# Patient Record
Sex: MALE | Race: WHITE | Employment: OTHER | URBAN - NONMETROPOLITAN AREA
[De-identification: names, ages, dates, MRNs, and addresses within clinical notes are randomized per-mention and may not be internally consistent; named-entity substitution may affect disease eponyms.]

---

## 2018-02-22 RX ORDER — AMLODIPINE BESYLATE 2.5 MG/1
2.5 TABLET ORAL DAILY
Status: ON HOLD | COMMUNITY
End: 2020-02-18 | Stop reason: ALTCHOICE

## 2018-02-22 RX ORDER — LISINOPRIL 40 MG/1
40 TABLET ORAL DAILY
Status: ON HOLD | COMMUNITY
End: 2020-02-18 | Stop reason: ALTCHOICE

## 2018-02-22 RX ORDER — HYDROCHLOROTHIAZIDE 12.5 MG/1
12.5 CAPSULE, GELATIN COATED ORAL EVERY MORNING
Status: ON HOLD | COMMUNITY
End: 2020-02-18 | Stop reason: ALTCHOICE

## 2018-03-04 PROBLEM — H25.811 COMBINED FORM OF AGE-RELATED CATARACT, RIGHT EYE: Chronic | Status: ACTIVE | Noted: 2018-03-04

## 2018-03-05 ENCOUNTER — HOSPITAL ENCOUNTER (OUTPATIENT)
Age: 78
Setting detail: OUTPATIENT SURGERY
Discharge: HOME | End: 2018-03-05
Attending: OPHTHALMOLOGY | Admitting: OPHTHALMOLOGY
Payer: MEDICARE

## 2018-03-05 VITALS
WEIGHT: 210 LBS | RESPIRATION RATE: 18 BRPM | HEIGHT: 72 IN | BODY MASS INDEX: 28.44 KG/M2 | TEMPERATURE: 96.9 F | HEART RATE: 58 BPM | OXYGEN SATURATION: 95 % | DIASTOLIC BLOOD PRESSURE: 88 MMHG | SYSTOLIC BLOOD PRESSURE: 145 MMHG

## 2018-03-05 PROBLEM — H25.811 COMBINED FORM OF AGE-RELATED CATARACT, RIGHT EYE: Chronic | Status: RESOLVED | Noted: 2018-03-04 | Resolved: 2018-03-05

## 2018-03-05 PROCEDURE — 7100000010 HC PHASE II RECOVERY - FIRST 15 MIN: Performed by: OPHTHALMOLOGY

## 2018-03-05 PROCEDURE — 7100000011 HC PHASE II RECOVERY - ADDTL 15 MIN: Performed by: OPHTHALMOLOGY

## 2018-03-05 PROCEDURE — 99153 MOD SED SAME PHYS/QHP EA: CPT | Performed by: OPHTHALMOLOGY

## 2018-03-05 PROCEDURE — 99152 MOD SED SAME PHYS/QHP 5/>YRS: CPT | Performed by: OPHTHALMOLOGY

## 2018-03-05 PROCEDURE — V2632 POST CHMBR INTRAOCULAR LENS: HCPCS | Performed by: OPHTHALMOLOGY

## 2018-03-05 PROCEDURE — 2500000003 HC RX 250 WO HCPCS

## 2018-03-05 PROCEDURE — 6370000000 HC RX 637 (ALT 250 FOR IP): Performed by: OPHTHALMOLOGY

## 2018-03-05 PROCEDURE — 2580000003 HC RX 258: Performed by: OPHTHALMOLOGY

## 2018-03-05 PROCEDURE — 2500000003 HC RX 250 WO HCPCS: Performed by: OPHTHALMOLOGY

## 2018-03-05 PROCEDURE — 6360000002 HC RX W HCPCS

## 2018-03-05 PROCEDURE — 3600000003 HC SURGERY LEVEL 3 BASE: Performed by: OPHTHALMOLOGY

## 2018-03-05 PROCEDURE — 6360000002 HC RX W HCPCS: Performed by: OPHTHALMOLOGY

## 2018-03-05 PROCEDURE — 3600000013 HC SURGERY LEVEL 3 ADDTL 15MIN: Performed by: OPHTHALMOLOGY

## 2018-03-05 DEVICE — LENS INTOCU +20 DIOPT L12.5MM DIA6MM 119.1 OPTICAL/118.7 A: Type: IMPLANTABLE DEVICE | Status: FUNCTIONAL

## 2018-03-05 RX ORDER — LORATADINE 10 MG/1
10 CAPSULE, LIQUID FILLED ORAL DAILY
COMMUNITY

## 2018-03-05 RX ORDER — PHENYLEPHRINE HCL 2.5 %
1 DROPS OPHTHALMIC (EYE) SEE ADMIN INSTRUCTIONS
Status: DISCONTINUED | OUTPATIENT
Start: 2018-03-05 | End: 2018-03-05 | Stop reason: HOSPADM

## 2018-03-05 RX ORDER — ONDANSETRON 2 MG/ML
4 INJECTION INTRAMUSCULAR; INTRAVENOUS EVERY 6 HOURS PRN
Status: DISCONTINUED | OUTPATIENT
Start: 2018-03-05 | End: 2018-03-05 | Stop reason: HOSPADM

## 2018-03-05 RX ORDER — MIDAZOLAM HYDROCHLORIDE 1 MG/ML
INJECTION INTRAMUSCULAR; INTRAVENOUS PRN
Status: DISCONTINUED | OUTPATIENT
Start: 2018-03-05 | End: 2018-03-05 | Stop reason: HOSPADM

## 2018-03-05 RX ORDER — TROPICAMIDE 10 MG/ML
1 SOLUTION/ DROPS OPHTHALMIC SEE ADMIN INSTRUCTIONS
Status: DISCONTINUED | OUTPATIENT
Start: 2018-03-05 | End: 2018-03-05 | Stop reason: HOSPADM

## 2018-03-05 RX ORDER — FENTANYL CITRATE 50 UG/ML
INJECTION, SOLUTION INTRAMUSCULAR; INTRAVENOUS PRN
Status: DISCONTINUED | OUTPATIENT
Start: 2018-03-05 | End: 2018-03-05 | Stop reason: HOSPADM

## 2018-03-05 RX ORDER — PROPARACAINE HYDROCHLORIDE 5 MG/ML
1 SOLUTION/ DROPS OPHTHALMIC SEE ADMIN INSTRUCTIONS
Status: DISCONTINUED | OUTPATIENT
Start: 2018-03-05 | End: 2018-03-05 | Stop reason: HOSPADM

## 2018-03-05 RX ORDER — SODIUM CHLORIDE 0.9 % (FLUSH) 0.9 %
10 SYRINGE (ML) INJECTION PRN
Status: DISCONTINUED | OUTPATIENT
Start: 2018-03-05 | End: 2018-03-05 | Stop reason: HOSPADM

## 2018-03-05 RX ORDER — ACETAMINOPHEN 325 MG/1
650 TABLET ORAL EVERY 4 HOURS PRN
Status: DISCONTINUED | OUTPATIENT
Start: 2018-03-05 | End: 2018-03-05 | Stop reason: HOSPADM

## 2018-03-05 RX ORDER — SODIUM CHLORIDE 9 MG/ML
INJECTION, SOLUTION INTRAVENOUS CONTINUOUS
Status: DISCONTINUED | OUTPATIENT
Start: 2018-03-05 | End: 2018-03-05 | Stop reason: HOSPADM

## 2018-03-05 RX ORDER — SODIUM CHLORIDE 0.9 % (FLUSH) 0.9 %
10 SYRINGE (ML) INJECTION EVERY 12 HOURS SCHEDULED
Status: DISCONTINUED | OUTPATIENT
Start: 2018-03-05 | End: 2018-03-05 | Stop reason: HOSPADM

## 2018-03-05 RX ORDER — TETRACAINE HYDROCHLORIDE 5 MG/ML
1 SOLUTION OPHTHALMIC SEE ADMIN INSTRUCTIONS
Status: DISCONTINUED | OUTPATIENT
Start: 2018-03-05 | End: 2018-03-05 | Stop reason: HOSPADM

## 2018-03-05 RX ORDER — LIDOCAINE HYDROCHLORIDE 10 MG/ML
INJECTION, SOLUTION EPIDURAL; INFILTRATION; INTRACAUDAL; PERINEURAL PRN
Status: DISCONTINUED | OUTPATIENT
Start: 2018-03-05 | End: 2018-03-05 | Stop reason: HOSPADM

## 2018-03-05 RX ADMIN — PHENYLEPHRINE HYDROCHLORIDE 1 DROP: 25 SOLUTION/ DROPS OPHTHALMIC at 12:25

## 2018-03-05 RX ADMIN — TROPICAMIDE 1 DROP: 10 SOLUTION/ DROPS OPHTHALMIC at 12:37

## 2018-03-05 RX ADMIN — TROPICAMIDE 1 DROP: 10 SOLUTION/ DROPS OPHTHALMIC at 12:25

## 2018-03-05 RX ADMIN — PROPARACAINE HYDROCHLORIDE 1 DROP: 5 SOLUTION/ DROPS OPHTHALMIC at 12:17

## 2018-03-05 RX ADMIN — PROPARACAINE HYDROCHLORIDE 1 DROP: 5 SOLUTION/ DROPS OPHTHALMIC at 12:37

## 2018-03-05 RX ADMIN — PHENYLEPHRINE HYDROCHLORIDE 1 DROP: 25 SOLUTION/ DROPS OPHTHALMIC at 12:37

## 2018-03-05 RX ADMIN — PROPARACAINE HYDROCHLORIDE 1 DROP: 5 SOLUTION/ DROPS OPHTHALMIC at 12:25

## 2018-03-05 RX ADMIN — TROPICAMIDE 1 DROP: 10 SOLUTION/ DROPS OPHTHALMIC at 12:17

## 2018-03-05 RX ADMIN — PHENYLEPHRINE HYDROCHLORIDE 1 DROP: 25 SOLUTION/ DROPS OPHTHALMIC at 12:17

## 2018-03-05 RX ADMIN — SODIUM CHLORIDE: 9 INJECTION, SOLUTION INTRAVENOUS at 12:25

## 2018-03-05 RX ADMIN — TETRACAINE HYDROCHLORIDE 1 DROP: 25 LIQUID OPHTHALMIC at 13:00

## 2018-03-05 ASSESSMENT — PAIN SCALES - GENERAL
PAINLEVEL_OUTOF10: 0
PAINLEVEL_OUTOF10: 0

## 2018-03-05 ASSESSMENT — PAIN - FUNCTIONAL ASSESSMENT: PAIN_FUNCTIONAL_ASSESSMENT: 0-10

## 2018-03-05 NOTE — OP NOTE
OPERATIVE NOTE      Patient:  Dea Machuca    YOB: 1940    Account #:  [de-identified]    Date:  3/5/2018    Surgeon:  Chana Broderick. 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 year old with decreased vision, glare and halos around lights, and trouble with activities of daily living. Examination revealed a visually significant cataract in the right eye. Other eye diseases have been ruled out as the primary cause of decreased visual function. Significant improvement is expected in the patient's visual acuity and/or visual function from the removal of the cataract. Risks, benefits, and alternatives to surgery were discussed with the patient and the patient elected to proceed with phacoemulsification with lens implantation. Details of the procedure: Following informed consent, the patient was identified by name and identification tag in the holding area,taken to the operating room and placed in the supine position. A time out was performed by all present in the room to identify the patient, the eye to be operated on, the correct operative procedure, and the correct intraocular lens. Monitoring leads were placed. The patient was positioned. An eyelid prep of half-strength Betadine was performed. The patient was administered intravenous sedation if desired and topical anesthetic was placed in the operative eye. The eye prepped a second time and draped in the usual sterile fashion using aseptic technique for cataract surgery. A lid speculum was then inserted. [place] was placed onto the corneal surface.   A stab incision

## 2018-04-01 PROBLEM — H25.812 COMBINED FORMS OF AGE-RELATED CATARACT OF LEFT EYE: Chronic | Status: ACTIVE | Noted: 2018-04-01

## 2018-04-02 ENCOUNTER — HOSPITAL ENCOUNTER (OUTPATIENT)
Age: 78
Setting detail: OUTPATIENT SURGERY
Discharge: HOME | End: 2018-04-02
Attending: OPHTHALMOLOGY | Admitting: OPHTHALMOLOGY
Payer: MEDICARE

## 2018-04-02 VITALS
DIASTOLIC BLOOD PRESSURE: 119 MMHG | OXYGEN SATURATION: 96 % | RESPIRATION RATE: 18 BRPM | TEMPERATURE: 97 F | WEIGHT: 200 LBS | SYSTOLIC BLOOD PRESSURE: 149 MMHG | HEART RATE: 65 BPM | HEIGHT: 72 IN | BODY MASS INDEX: 27.09 KG/M2

## 2018-04-02 PROBLEM — H25.812 COMBINED FORMS OF AGE-RELATED CATARACT OF LEFT EYE: Chronic | Status: RESOLVED | Noted: 2018-04-01 | Resolved: 2018-04-02

## 2018-04-02 PROCEDURE — 7100000010 HC PHASE II RECOVERY - FIRST 15 MIN: Performed by: OPHTHALMOLOGY

## 2018-04-02 PROCEDURE — 7100000011 HC PHASE II RECOVERY - ADDTL 15 MIN: Performed by: OPHTHALMOLOGY

## 2018-04-02 PROCEDURE — 2580000003 HC RX 258: Performed by: OPHTHALMOLOGY

## 2018-04-02 PROCEDURE — 99152 MOD SED SAME PHYS/QHP 5/>YRS: CPT | Performed by: OPHTHALMOLOGY

## 2018-04-02 PROCEDURE — V2632 POST CHMBR INTRAOCULAR LENS: HCPCS | Performed by: OPHTHALMOLOGY

## 2018-04-02 PROCEDURE — 6370000000 HC RX 637 (ALT 250 FOR IP): Performed by: OPHTHALMOLOGY

## 2018-04-02 PROCEDURE — 3600000013 HC SURGERY LEVEL 3 ADDTL 15MIN: Performed by: OPHTHALMOLOGY

## 2018-04-02 PROCEDURE — 2500000003 HC RX 250 WO HCPCS: Performed by: OPHTHALMOLOGY

## 2018-04-02 PROCEDURE — 99153 MOD SED SAME PHYS/QHP EA: CPT | Performed by: OPHTHALMOLOGY

## 2018-04-02 PROCEDURE — 3600000003 HC SURGERY LEVEL 3 BASE: Performed by: OPHTHALMOLOGY

## 2018-04-02 PROCEDURE — 6360000002 HC RX W HCPCS: Performed by: OPHTHALMOLOGY

## 2018-04-02 DEVICE — LENS INTRAOCULAR BCNVX 20.5+ DIOPT 6X12.5 MM ACRYL ENVISTA: Type: IMPLANTABLE DEVICE | Status: FUNCTIONAL

## 2018-04-02 RX ORDER — SODIUM CHLORIDE 9 MG/ML
INJECTION, SOLUTION INTRAVENOUS CONTINUOUS
Status: DISCONTINUED | OUTPATIENT
Start: 2018-04-02 | End: 2018-04-02 | Stop reason: HOSPADM

## 2018-04-02 RX ORDER — TETRACAINE HYDROCHLORIDE 5 MG/ML
1 SOLUTION OPHTHALMIC SEE ADMIN INSTRUCTIONS
Status: DISCONTINUED | OUTPATIENT
Start: 2018-04-02 | End: 2018-04-02 | Stop reason: HOSPADM

## 2018-04-02 RX ORDER — TROPICAMIDE 10 MG/ML
1 SOLUTION/ DROPS OPHTHALMIC SEE ADMIN INSTRUCTIONS
Status: DISCONTINUED | OUTPATIENT
Start: 2018-04-02 | End: 2018-04-02 | Stop reason: HOSPADM

## 2018-04-02 RX ORDER — SODIUM CHLORIDE 0.9 % (FLUSH) 0.9 %
10 SYRINGE (ML) INJECTION EVERY 12 HOURS SCHEDULED
Status: DISCONTINUED | OUTPATIENT
Start: 2018-04-02 | End: 2018-04-02 | Stop reason: HOSPADM

## 2018-04-02 RX ORDER — ONDANSETRON 2 MG/ML
4 INJECTION INTRAMUSCULAR; INTRAVENOUS EVERY 6 HOURS PRN
Status: CANCELLED | OUTPATIENT
Start: 2018-04-02

## 2018-04-02 RX ORDER — PHENYLEPHRINE HCL 2.5 %
1 DROPS OPHTHALMIC (EYE) SEE ADMIN INSTRUCTIONS
Status: DISCONTINUED | OUTPATIENT
Start: 2018-04-02 | End: 2018-04-02 | Stop reason: HOSPADM

## 2018-04-02 RX ORDER — ACETAMINOPHEN 325 MG/1
650 TABLET ORAL EVERY 4 HOURS PRN
Status: CANCELLED | OUTPATIENT
Start: 2018-04-02

## 2018-04-02 RX ORDER — TETRACAINE HYDROCHLORIDE 5 MG/ML
SOLUTION OPHTHALMIC PRN
Status: DISCONTINUED | OUTPATIENT
Start: 2018-04-02 | End: 2018-04-02 | Stop reason: HOSPADM

## 2018-04-02 RX ORDER — PROPARACAINE HYDROCHLORIDE 5 MG/ML
1 SOLUTION/ DROPS OPHTHALMIC SEE ADMIN INSTRUCTIONS
Status: DISCONTINUED | OUTPATIENT
Start: 2018-04-02 | End: 2018-04-02 | Stop reason: HOSPADM

## 2018-04-02 RX ORDER — FENTANYL CITRATE 50 UG/ML
INJECTION, SOLUTION INTRAMUSCULAR; INTRAVENOUS PRN
Status: DISCONTINUED | OUTPATIENT
Start: 2018-04-02 | End: 2018-04-02 | Stop reason: HOSPADM

## 2018-04-02 RX ORDER — LIDOCAINE HYDROCHLORIDE 10 MG/ML
INJECTION, SOLUTION EPIDURAL; INFILTRATION; INTRACAUDAL; PERINEURAL PRN
Status: DISCONTINUED | OUTPATIENT
Start: 2018-04-02 | End: 2018-04-02 | Stop reason: HOSPADM

## 2018-04-02 RX ORDER — SODIUM CHLORIDE 0.9 % (FLUSH) 0.9 %
10 SYRINGE (ML) INJECTION EVERY 12 HOURS SCHEDULED
Status: CANCELLED | OUTPATIENT
Start: 2018-04-02

## 2018-04-02 RX ORDER — MIDAZOLAM HYDROCHLORIDE 1 MG/ML
INJECTION INTRAMUSCULAR; INTRAVENOUS PRN
Status: DISCONTINUED | OUTPATIENT
Start: 2018-04-02 | End: 2018-04-02 | Stop reason: HOSPADM

## 2018-04-02 RX ORDER — SODIUM CHLORIDE 0.9 % (FLUSH) 0.9 %
10 SYRINGE (ML) INJECTION PRN
Status: CANCELLED | OUTPATIENT
Start: 2018-04-02

## 2018-04-02 RX ORDER — SODIUM CHLORIDE 0.9 % (FLUSH) 0.9 %
10 SYRINGE (ML) INJECTION PRN
Status: DISCONTINUED | OUTPATIENT
Start: 2018-04-02 | End: 2018-04-02 | Stop reason: HOSPADM

## 2018-04-02 RX ADMIN — PHENYLEPHRINE HYDROCHLORIDE 1 DROP: 25 SOLUTION/ DROPS OPHTHALMIC at 14:25

## 2018-04-02 RX ADMIN — SODIUM CHLORIDE: 9 INJECTION, SOLUTION INTRAVENOUS at 14:22

## 2018-04-02 RX ADMIN — PROPARACAINE HYDROCHLORIDE 1 DROP: 5 SOLUTION/ DROPS OPHTHALMIC at 14:25

## 2018-04-02 RX ADMIN — PHENYLEPHRINE HYDROCHLORIDE 1 DROP: 25 SOLUTION/ DROPS OPHTHALMIC at 14:17

## 2018-04-02 RX ADMIN — TROPICAMIDE 1 DROP: 10 SOLUTION/ DROPS OPHTHALMIC at 14:24

## 2018-04-02 RX ADMIN — TROPICAMIDE 1 DROP: 10 SOLUTION/ DROPS OPHTHALMIC at 14:33

## 2018-04-02 RX ADMIN — PROPARACAINE HYDROCHLORIDE 1 DROP: 5 SOLUTION/ DROPS OPHTHALMIC at 14:16

## 2018-04-02 RX ADMIN — TROPICAMIDE 1 DROP: 10 SOLUTION/ DROPS OPHTHALMIC at 14:16

## 2018-04-02 RX ADMIN — PROPARACAINE HYDROCHLORIDE 1 DROP: 5 SOLUTION/ DROPS OPHTHALMIC at 14:33

## 2018-04-02 RX ADMIN — PHENYLEPHRINE HYDROCHLORIDE 1 DROP: 25 SOLUTION/ DROPS OPHTHALMIC at 14:33

## 2018-04-02 ASSESSMENT — PAIN - FUNCTIONAL ASSESSMENT: PAIN_FUNCTIONAL_ASSESSMENT: 0-10

## 2018-04-02 ASSESSMENT — PAIN SCALES - GENERAL: PAINLEVEL_OUTOF10: 0

## 2018-07-30 ENCOUNTER — HOSPITAL ENCOUNTER (OUTPATIENT)
Dept: PULMONOLOGY | Age: 78
Discharge: HOME | End: 2018-07-30
Payer: MEDICARE

## 2018-07-30 ENCOUNTER — HOSPITAL ENCOUNTER (OUTPATIENT)
Age: 78
Discharge: HOME | End: 2018-08-01
Payer: MEDICARE

## 2018-07-30 ENCOUNTER — HOSPITAL ENCOUNTER (OUTPATIENT)
Dept: GENERAL RADIOLOGY | Age: 78
Discharge: HOME | End: 2018-08-01
Payer: MEDICARE

## 2018-07-30 DIAGNOSIS — R05.9 COUGH: ICD-10-CM

## 2018-07-30 PROCEDURE — 94729 DIFFUSING CAPACITY: CPT

## 2018-07-30 PROCEDURE — 71046 X-RAY EXAM CHEST 2 VIEWS: CPT

## 2018-07-30 PROCEDURE — 94726 PLETHYSMOGRAPHY LUNG VOLUMES: CPT

## 2018-07-30 PROCEDURE — 94664 DEMO&/EVAL PT USE INHALER: CPT

## 2018-07-30 PROCEDURE — 6360000002 HC RX W HCPCS: Performed by: INTERNAL MEDICINE

## 2018-07-30 PROCEDURE — 94060 EVALUATION OF WHEEZING: CPT

## 2018-07-30 RX ORDER — ALBUTEROL SULFATE 2.5 MG/3ML
2.5 SOLUTION RESPIRATORY (INHALATION) ONCE
Status: COMPLETED | OUTPATIENT
Start: 2018-07-30 | End: 2018-07-30

## 2018-07-30 RX ADMIN — ALBUTEROL SULFATE 2.5 MG: 2.5 SOLUTION RESPIRATORY (INHALATION) at 13:40

## 2018-08-01 NOTE — PROCEDURES
[address] [address]                                PULMONARY FUNCTION    PATIENT NAME: Elie Palomo                     :        1940  MED REC NO:   [id number]                              ROOM:  ACCOUNT NO:   [de-identified]                           ADMIT DATE: 2018  PROVIDER:     Melanie Sosa    DATE OF PROCEDURE:  2018    FINDINGS:  The spirometry shows FVC is 6.04, 137% of predicted. FEV1 is  4.32, 138% of predicted. FEV1/FVC ratio is normal without evidence of  obstruction. Postbronchodilator, there was no significant response to  bronchodilator. Lung volume shows residual volume was 4.65, 168% of  predicted. Total lung capacity was 10.39, 139% of predicted, which was  consistent with hyperinflation or airway trapping. Diffusion capacity was  24.9, 171% of predicted consistent with mild reduction in diffusion  capacity, which could be secondary to early emphysema, pulmonary vascular  disease, and/or anemia; and clinical correlation is recommended. IMPRESSION:  This pulmonary function test shows normal spirometry without  evidence of obstruction. There was no significant improvement in  postbronchodilator spirometry, but clinical response is still possible. Lung volumes are consistent with hyperinflation or airway trapping. Diffusion capacity was mildly reduced, which could be secondary to early  emphysema and/or pulmonary vascular disease, and clinical correlation is  recommended.         Talisha oJrge    D: 2018 15:30:50       T: 2018 21:18:04     [email or web address]  Job#: [id number]     Doc#: [id number]    CC:

## 2020-02-11 ENCOUNTER — APPOINTMENT (OUTPATIENT)
Dept: CT IMAGING | Age: 80
DRG: 388 | End: 2020-02-11
Payer: MEDICARE

## 2020-02-11 ENCOUNTER — HOSPITAL ENCOUNTER (INPATIENT)
Age: 80
LOS: 8 days | Discharge: HOME | DRG: 388 | End: 2020-02-20
Attending: INTERNAL MEDICINE | Admitting: INTERNAL MEDICINE
Payer: MEDICARE

## 2020-02-11 LAB
ABSOLUTE EOS #: 0.03 K/UL (ref 0–0.44)
ABSOLUTE IMMATURE GRANULOCYTE: 0.03 K/UL (ref 0–0.3)
ABSOLUTE LYMPH #: 1.77 K/UL (ref 1.1–3.7)
ABSOLUTE MONO #: 0.04 K/UL (ref 0.1–1.2)
ALBUMIN SERPL-MCNC: 5 G/DL (ref 3.5–5.2)
ALBUMIN/GLOBULIN RATIO: 2 (ref 1–2.5)
ALP BLD-CCNC: 63 U/L (ref 40–129)
ALT SERPL-CCNC: 23 U/L (ref 5–41)
ANION GAP SERPL CALCULATED.3IONS-SCNC: 16 MMOL/L (ref 9–17)
AST SERPL-CCNC: 20 U/L
BASOPHILS # BLD: 0 % (ref 0–2)
BASOPHILS ABSOLUTE: 0.03 K/UL (ref 0–0.2)
BILIRUB SERPL-MCNC: 0.45 MG/DL (ref 0.3–1.2)
BUN BLDV-MCNC: 28 MG/DL (ref 8–23)
BUN/CREAT BLD: 18 (ref 9–20)
CALCIUM SERPL-MCNC: 10 MG/DL (ref 8.6–10.4)
CHLORIDE BLD-SCNC: 101 MMOL/L (ref 98–107)
CO2: 26 MMOL/L (ref 20–31)
CREAT SERPL-MCNC: 1.55 MG/DL (ref 0.7–1.2)
DIFFERENTIAL TYPE: ABNORMAL
EOSINOPHILS RELATIVE PERCENT: 0 % (ref 1–4)
GFR AFRICAN AMERICAN: 52 ML/MIN
GFR NON-AFRICAN AMERICAN: 43 ML/MIN
GFR SERPL CREATININE-BSD FRML MDRD: ABNORMAL ML/MIN/{1.73_M2}
GFR SERPL CREATININE-BSD FRML MDRD: ABNORMAL ML/MIN/{1.73_M2}
GLUCOSE BLD-MCNC: 127 MG/DL (ref 70–99)
HCT VFR BLD CALC: 50.3 % (ref 40.7–50.3)
HEMOGLOBIN: 17.4 G/DL (ref 13–17)
IMMATURE GRANULOCYTES: 0 %
LACTIC ACID, SEPSIS WHOLE BLOOD: ABNORMAL MMOL/L (ref 0.5–1.9)
LACTIC ACID, SEPSIS: 2.5 MMOL/L (ref 0.5–1.9)
LIPASE: 77 U/L (ref 13–60)
LYMPHOCYTES # BLD: 20 % (ref 24–43)
MCH RBC QN AUTO: 30.9 PG (ref 25.2–33.5)
MCHC RBC AUTO-ENTMCNC: 34.6 G/DL (ref 28.4–34.8)
MCV RBC AUTO: 89.2 FL (ref 82.6–102.9)
MONOCYTES # BLD: 1 % (ref 3–12)
NRBC AUTOMATED: 0 PER 100 WBC
PDW BLD-RTO: 12.8 % (ref 11.8–14.4)
PLATELET # BLD: 172 K/UL (ref 138–453)
PLATELET ESTIMATE: ABNORMAL
PMV BLD AUTO: 9.9 FL (ref 8.1–13.5)
POTASSIUM SERPL-SCNC: 3.8 MMOL/L (ref 3.7–5.3)
RBC # BLD: 5.64 M/UL (ref 4.21–5.77)
RBC # BLD: ABNORMAL 10*6/UL
SEG NEUTROPHILS: 78 % (ref 36–65)
SEGMENTED NEUTROPHILS ABSOLUTE COUNT: 6.76 K/UL (ref 1.5–8.1)
SODIUM BLD-SCNC: 143 MMOL/L (ref 135–144)
TOTAL PROTEIN: 7.5 G/DL (ref 6.4–8.3)
WBC # BLD: 8.7 K/UL (ref 3.5–11.3)
WBC # BLD: ABNORMAL 10*3/UL

## 2020-02-11 PROCEDURE — 74177 CT ABD & PELVIS W/CONTRAST: CPT

## 2020-02-11 PROCEDURE — 83690 ASSAY OF LIPASE: CPT

## 2020-02-11 PROCEDURE — 96365 THER/PROPH/DIAG IV INF INIT: CPT

## 2020-02-11 PROCEDURE — 6360000002 HC RX W HCPCS: Performed by: PHYSICIAN ASSISTANT

## 2020-02-11 PROCEDURE — 83605 ASSAY OF LACTIC ACID: CPT

## 2020-02-11 PROCEDURE — 85025 COMPLETE CBC W/AUTO DIFF WBC: CPT

## 2020-02-11 PROCEDURE — 96375 TX/PRO/DX INJ NEW DRUG ADDON: CPT

## 2020-02-11 PROCEDURE — 2580000003 HC RX 258: Performed by: PHYSICIAN ASSISTANT

## 2020-02-11 PROCEDURE — 99285 EMERGENCY DEPT VISIT HI MDM: CPT

## 2020-02-11 PROCEDURE — 96368 THER/DIAG CONCURRENT INF: CPT

## 2020-02-11 PROCEDURE — 80053 COMPREHEN METABOLIC PANEL: CPT

## 2020-02-11 PROCEDURE — 2500000003 HC RX 250 WO HCPCS: Performed by: EMERGENCY MEDICINE

## 2020-02-11 PROCEDURE — 6360000004 HC RX CONTRAST MEDICATION: Performed by: PHYSICIAN ASSISTANT

## 2020-02-11 PROCEDURE — 6360000002 HC RX W HCPCS: Performed by: EMERGENCY MEDICINE

## 2020-02-11 RX ORDER — 0.9 % SODIUM CHLORIDE 0.9 %
500 INTRAVENOUS SOLUTION INTRAVENOUS ONCE
Status: COMPLETED | OUTPATIENT
Start: 2020-02-11 | End: 2020-02-11

## 2020-02-11 RX ORDER — PROMETHAZINE HYDROCHLORIDE 25 MG/ML
12.5 INJECTION, SOLUTION INTRAMUSCULAR; INTRAVENOUS ONCE
Status: COMPLETED | OUTPATIENT
Start: 2020-02-11 | End: 2020-02-11

## 2020-02-11 RX ORDER — MORPHINE SULFATE 4 MG/ML
4 INJECTION, SOLUTION INTRAMUSCULAR; INTRAVENOUS ONCE
Status: COMPLETED | OUTPATIENT
Start: 2020-02-11 | End: 2020-02-11

## 2020-02-11 RX ORDER — ONDANSETRON 2 MG/ML
4 INJECTION INTRAMUSCULAR; INTRAVENOUS ONCE
Status: COMPLETED | OUTPATIENT
Start: 2020-02-12 | End: 2020-02-12

## 2020-02-11 RX ORDER — CIPROFLOXACIN 2 MG/ML
400 INJECTION, SOLUTION INTRAVENOUS ONCE
Status: COMPLETED | OUTPATIENT
Start: 2020-02-11 | End: 2020-02-12

## 2020-02-11 RX ADMIN — CIPROFLOXACIN 400 MG: 2 INJECTION, SOLUTION INTRAVENOUS at 23:44

## 2020-02-11 RX ADMIN — PROMETHAZINE HYDROCHLORIDE 12.5 MG: 25 INJECTION, SOLUTION INTRAMUSCULAR; INTRAVENOUS at 21:24

## 2020-02-11 RX ADMIN — IOPAMIDOL 75 ML: 755 INJECTION, SOLUTION INTRAVENOUS at 22:37

## 2020-02-11 RX ADMIN — SODIUM CHLORIDE 500 ML: 9 INJECTION, SOLUTION INTRAVENOUS at 22:43

## 2020-02-11 RX ADMIN — METRONIDAZOLE 500 MG: 500 INJECTION, SOLUTION INTRAVENOUS at 23:44

## 2020-02-11 RX ADMIN — MORPHINE SULFATE 4 MG: 4 INJECTION, SOLUTION INTRAMUSCULAR; INTRAVENOUS at 23:44

## 2020-02-11 ASSESSMENT — PAIN DESCRIPTION - ORIENTATION: ORIENTATION: LOWER

## 2020-02-11 ASSESSMENT — PAIN SCALES - GENERAL
PAINLEVEL_OUTOF10: 1
PAINLEVEL_OUTOF10: 2

## 2020-02-11 ASSESSMENT — PAIN DESCRIPTION - LOCATION: LOCATION: ABDOMEN

## 2020-02-11 ASSESSMENT — PAIN DESCRIPTION - PAIN TYPE: TYPE: ACUTE PAIN

## 2020-02-12 ENCOUNTER — APPOINTMENT (OUTPATIENT)
Dept: GENERAL RADIOLOGY | Age: 80
DRG: 388 | End: 2020-02-12
Payer: MEDICARE

## 2020-02-12 PROBLEM — K56.609 SBO (SMALL BOWEL OBSTRUCTION) (HCC): Status: ACTIVE | Noted: 2020-02-12

## 2020-02-12 PROBLEM — E86.0 DEHYDRATION: Status: ACTIVE | Noted: 2020-02-12

## 2020-02-12 PROBLEM — K52.9 ENTERITIS: Status: ACTIVE | Noted: 2020-02-12

## 2020-02-12 PROBLEM — K52.9 COLITIS: Status: ACTIVE | Noted: 2020-02-12

## 2020-02-12 PROBLEM — R11.2 NAUSEA & VOMITING: Status: ACTIVE | Noted: 2020-02-12

## 2020-02-12 PROBLEM — E87.20 LACTIC ACIDOSIS: Status: ACTIVE | Noted: 2020-02-12

## 2020-02-12 LAB
ANION GAP SERPL CALCULATED.3IONS-SCNC: 19 MMOL/L (ref 9–17)
BILIRUBIN URINE: NEGATIVE
BUN BLDV-MCNC: 29 MG/DL (ref 8–23)
BUN/CREAT BLD: 20 (ref 9–20)
CALCIUM SERPL-MCNC: 9.2 MG/DL (ref 8.6–10.4)
CHLORIDE BLD-SCNC: 101 MMOL/L (ref 98–107)
CO2: 21 MMOL/L (ref 20–31)
COLOR: YELLOW
COMMENT UA: NORMAL
CREAT SERPL-MCNC: 1.45 MG/DL (ref 0.7–1.2)
EKG ATRIAL RATE: 104 BPM
EKG P AXIS: 57 DEGREES
EKG P-R INTERVAL: 190 MS
EKG Q-T INTERVAL: 350 MS
EKG QRS DURATION: 90 MS
EKG QTC CALCULATION (BAZETT): 460 MS
EKG R AXIS: -46 DEGREES
EKG T AXIS: 1 DEGREES
EKG VENTRICULAR RATE: 104 BPM
GFR AFRICAN AMERICAN: 57 ML/MIN
GFR NON-AFRICAN AMERICAN: 47 ML/MIN
GFR SERPL CREATININE-BSD FRML MDRD: ABNORMAL ML/MIN/{1.73_M2}
GFR SERPL CREATININE-BSD FRML MDRD: ABNORMAL ML/MIN/{1.73_M2}
GLUCOSE BLD-MCNC: 161 MG/DL (ref 70–99)
GLUCOSE URINE: NEGATIVE
HCT VFR BLD CALC: 45.8 % (ref 40.7–50.3)
HEMOGLOBIN: 15.4 G/DL (ref 13–17)
KETONES, URINE: NEGATIVE
LACTIC ACID, SEPSIS WHOLE BLOOD: ABNORMAL MMOL/L (ref 0.5–1.9)
LACTIC ACID, SEPSIS: 3.2 MMOL/L (ref 0.5–1.9)
LACTIC ACID, WHOLE BLOOD: ABNORMAL MMOL/L (ref 0.7–2.1)
LACTIC ACID, WHOLE BLOOD: ABNORMAL MMOL/L (ref 0.7–2.1)
LACTIC ACID: 2.3 MMOL/L (ref 0.5–2.2)
LACTIC ACID: 5.2 MMOL/L (ref 0.5–2.2)
LEUKOCYTE ESTERASE, URINE: NEGATIVE
MCH RBC QN AUTO: 30.6 PG (ref 25.2–33.5)
MCHC RBC AUTO-ENTMCNC: 33.6 G/DL (ref 28.4–34.8)
MCV RBC AUTO: 90.9 FL (ref 82.6–102.9)
NITRITE, URINE: NEGATIVE
NRBC AUTOMATED: 0 PER 100 WBC
PDW BLD-RTO: 13 % (ref 11.8–14.4)
PH UA: 6 (ref 5–9)
PLATELET # BLD: 123 K/UL (ref 138–453)
PMV BLD AUTO: 10.1 FL (ref 8.1–13.5)
POTASSIUM SERPL-SCNC: 3.8 MMOL/L (ref 3.7–5.3)
PROTEIN UA: NEGATIVE
RBC # BLD: 5.04 M/UL (ref 4.21–5.77)
SODIUM BLD-SCNC: 141 MMOL/L (ref 135–144)
SPECIFIC GRAVITY UA: 1.01 (ref 1.01–1.02)
TURBIDITY: CLEAR
URINE HGB: NEGATIVE
UROBILINOGEN, URINE: NORMAL
WBC # BLD: 13.4 K/UL (ref 3.5–11.3)

## 2020-02-12 PROCEDURE — 36415 COLL VENOUS BLD VENIPUNCTURE: CPT

## 2020-02-12 PROCEDURE — 6360000002 HC RX W HCPCS: Performed by: EMERGENCY MEDICINE

## 2020-02-12 PROCEDURE — 93010 ELECTROCARDIOGRAM REPORT: CPT | Performed by: INTERNAL MEDICINE

## 2020-02-12 PROCEDURE — 2500000003 HC RX 250 WO HCPCS: Performed by: INTERNAL MEDICINE

## 2020-02-12 PROCEDURE — 93005 ELECTROCARDIOGRAM TRACING: CPT | Performed by: INTERNAL MEDICINE

## 2020-02-12 PROCEDURE — 85027 COMPLETE CBC AUTOMATED: CPT

## 2020-02-12 PROCEDURE — 1200000000 HC SEMI PRIVATE

## 2020-02-12 PROCEDURE — 94664 DEMO&/EVAL PT USE INHALER: CPT

## 2020-02-12 PROCEDURE — 96375 TX/PRO/DX INJ NEW DRUG ADDON: CPT

## 2020-02-12 PROCEDURE — 2580000003 HC RX 258: Performed by: INTERNAL MEDICINE

## 2020-02-12 PROCEDURE — 6360000002 HC RX W HCPCS: Performed by: INTERNAL MEDICINE

## 2020-02-12 PROCEDURE — 83605 ASSAY OF LACTIC ACID: CPT

## 2020-02-12 PROCEDURE — 81003 URINALYSIS AUTO W/O SCOPE: CPT

## 2020-02-12 PROCEDURE — 74022 RADEX COMPL AQT ABD SERIES: CPT

## 2020-02-12 PROCEDURE — 99222 1ST HOSP IP/OBS MODERATE 55: CPT | Performed by: SURGERY

## 2020-02-12 PROCEDURE — 80048 BASIC METABOLIC PNL TOTAL CA: CPT

## 2020-02-12 RX ORDER — DEXTROSE AND SODIUM CHLORIDE 5; .9 G/100ML; G/100ML
INJECTION, SOLUTION INTRAVENOUS CONTINUOUS
Status: DISCONTINUED | OUTPATIENT
Start: 2020-02-12 | End: 2020-02-14

## 2020-02-12 RX ORDER — SODIUM CHLORIDE 0.9 % (FLUSH) 0.9 %
10 SYRINGE (ML) INJECTION PRN
Status: DISCONTINUED | OUTPATIENT
Start: 2020-02-12 | End: 2020-02-20 | Stop reason: HOSPADM

## 2020-02-12 RX ORDER — ONDANSETRON 2 MG/ML
4 INJECTION INTRAMUSCULAR; INTRAVENOUS EVERY 6 HOURS PRN
Status: DISCONTINUED | OUTPATIENT
Start: 2020-02-12 | End: 2020-02-20 | Stop reason: HOSPADM

## 2020-02-12 RX ORDER — MORPHINE SULFATE 4 MG/ML
4 INJECTION, SOLUTION INTRAMUSCULAR; INTRAVENOUS EVERY 4 HOURS PRN
Status: DISCONTINUED | OUTPATIENT
Start: 2020-02-12 | End: 2020-02-20 | Stop reason: HOSPADM

## 2020-02-12 RX ORDER — CIPROFLOXACIN 2 MG/ML
400 INJECTION, SOLUTION INTRAVENOUS EVERY 12 HOURS
Status: DISCONTINUED | OUTPATIENT
Start: 2020-02-12 | End: 2020-02-13

## 2020-02-12 RX ORDER — LOSARTAN POTASSIUM AND HYDROCHLOROTHIAZIDE 25; 100 MG/1; MG/1
1 TABLET ORAL DAILY
COMMUNITY
End: 2021-04-06 | Stop reason: SDUPTHER

## 2020-02-12 RX ORDER — SODIUM CHLORIDE 0.9 % (FLUSH) 0.9 %
10 SYRINGE (ML) INJECTION EVERY 12 HOURS SCHEDULED
Status: DISCONTINUED | OUTPATIENT
Start: 2020-02-12 | End: 2020-02-20 | Stop reason: HOSPADM

## 2020-02-12 RX ADMIN — ENOXAPARIN SODIUM 40 MG: 40 INJECTION, SOLUTION INTRAVENOUS; SUBCUTANEOUS at 08:22

## 2020-02-12 RX ADMIN — DEXTROSE AND SODIUM CHLORIDE: 5; 900 INJECTION, SOLUTION INTRAVENOUS at 11:20

## 2020-02-12 RX ADMIN — METRONIDAZOLE 500 MG: 500 INJECTION, SOLUTION INTRAVENOUS at 07:40

## 2020-02-12 RX ADMIN — METRONIDAZOLE 500 MG: 500 INJECTION, SOLUTION INTRAVENOUS at 16:07

## 2020-02-12 RX ADMIN — CIPROFLOXACIN 400 MG: 2 INJECTION, SOLUTION INTRAVENOUS at 11:20

## 2020-02-12 RX ADMIN — ONDANSETRON 4 MG: 2 INJECTION INTRAMUSCULAR; INTRAVENOUS at 00:03

## 2020-02-12 RX ADMIN — DEXTROSE AND SODIUM CHLORIDE: 5; 900 INJECTION, SOLUTION INTRAVENOUS at 01:49

## 2020-02-12 RX ADMIN — DEXTROSE AND SODIUM CHLORIDE: 5; 900 INJECTION, SOLUTION INTRAVENOUS at 21:52

## 2020-02-12 RX ADMIN — CIPROFLOXACIN 400 MG: 2 INJECTION, SOLUTION INTRAVENOUS at 23:57

## 2020-02-12 SDOH — SOCIAL STABILITY: SOCIAL NETWORK: ARE YOU MARRIED, WIDOWED, DIVORCED, SEPARATED, NEVER MARRIED, OR LIVING WITH A PARTNER?: MARRIED

## 2020-02-12 ASSESSMENT — PAIN SCALES - GENERAL
PAINLEVEL_OUTOF10: 0
PAINLEVEL_OUTOF10: 0

## 2020-02-12 ASSESSMENT — ENCOUNTER SYMPTOMS
SHORTNESS OF BREATH: 0
EYE PAIN: 0
ABDOMINAL PAIN: 1

## 2020-02-12 NOTE — PROGRESS NOTES
This nurse spoke with Caryle Mori in outpatient surgery department. Will notify Dr. Africa Samano about consult.

## 2020-02-12 NOTE — PLAN OF CARE
Problem: Pain:  Goal: Pain level will decrease  Description  Pain level will decrease  Outcome: Ongoing  Note:   Pt's pain is assessed using 0-10 scale. Pt has PRN pain medications available. Patient has been educated on use and possible side effects of pain medications. Patient understands to ask for pain medications before pain becomes too unbearable but has also been educated and nonpharmacological options such as deep breathing, distraction, and repositioning. Goal: Control of acute pain  Description  Control of acute pain  Outcome: Ongoing  Goal: Control of chronic pain  Description  Control of chronic pain  Outcome: Ongoing     Problem: Falls - Risk of:  Goal: Will remain free from falls  Description  Will remain free from falls  Outcome: Ongoing  Note:   Pt A&Ox4. Pt uses call light appropriately and call light and bedside table remain in reach. Rails up x2 bed in lowest position. Bed alarm is on. Pt wears nonskid socks when standing or ambulating. Fall sign in place. Room remains free of clutter. Goal: Absence of physical injury  Description  Absence of physical injury  Outcome: Ongoing     Problem: Activity:  Goal: Risk for activity intolerance will decrease  Description  Risk for activity intolerance will decrease  Outcome: Ongoing  Note:   Patient is encouraged to ambulate to and from the chair, bed, and bathroom. Patient will ambulate at least three times on this shift. Patient is encouraged to do all ADLs within the patient's abilities. Pain medications given at least fifteen minutes before activity when appropriate. Problem: Bowel/Gastric:  Goal: Bowel function will improve  Description  Bowel function will improve  Outcome: Ongoing  Note:   X-ray scheduled of abdomen. Surgery consulted.   Goal: Diagnostic test results will improve  Description  Diagnostic test results will improve  Outcome: Ongoing  Goal: Occurrences of nausea will decrease  Description  Occurrences of nausea will decrease  Outcome: Ongoing  Note:   Antiemetics ordered. Patient educated on positioning and other nonpharmacologic ways to ease nausea. Goal: Occurrences of vomiting will decrease  Description  Occurrences of vomiting will decrease  Outcome: Ongoing     Problem: Fluid Volume:  Goal: Maintenance of adequate hydration will improve  Description  Maintenance of adequate hydration will improve  Outcome: Ongoing  Note:   Maintain hydration by drinking small amounts of clear fluids frequently. Vitals and pulses are checked twice per shift and PRN. Skin is being assessed and I&O's recorded. Labs are being drawn daily. Respiratory status being checked with vitals and PRN. IV fluids infusing. Problem: Health Behavior:  Goal: Ability to state signs and symptoms to report to health care provider will improve  Description  Ability to state signs and symptoms to report to health care provider will improve  Outcome: Ongoing     Problem: Physical Regulation:  Goal: Complications related to the disease process, condition or treatment will be avoided or minimized  Description  Complications related to the disease process, condition or treatment will be avoided or minimized  Outcome: Ongoing  Goal: Ability to maintain clinical measurements within normal limits will improve  Description  Ability to maintain clinical measurements within normal limits will improve  Outcome: Ongoing     Problem: Sensory:  Goal: Pain level will decrease  Description  Pain level will decrease  Outcome: Ongoing  Note:   Pt's pain is assessed using 0-10 scale. Pt has PRN pain medications available. Patient has been educated on use and possible side effects of pain medications. Patient understands to ask for pain medications before pain becomes too unbearable but has also been educated and nonpharmacological options such as deep breathing, distraction, and repositioning.     Goal: Ability to identify factors that increase the pain will

## 2020-02-12 NOTE — PLAN OF CARE
Problem: Pain:  Goal: Pain level will decrease  Description  Pain level will decrease  2/12/2020 1102 by Janie Cobian RN  Outcome: Ongoing  2/12/2020 0532 by Marc Cosme RN  Outcome: Ongoing  Note:   Pt's pain is assessed using 0-10 scale. Pt has PRN pain medications available. Patient has been educated on use and possible side effects of pain medications. Patient understands to ask for pain medications before pain becomes too unbearable but has also been educated and nonpharmacological options such as deep breathing, distraction, and repositioning. Goal: Control of acute pain  Description  Control of acute pain  2/12/2020 0532 by Marc Cosme RN  Outcome: Ongoing  Goal: Control of chronic pain  Description  Control of chronic pain  2/12/2020 0532 by Marc Cosme RN  Outcome: Ongoing     Problem: Falls - Risk of:  Goal: Will remain free from falls  Description  Will remain free from falls  2/12/2020 1102 by Janie Cobian RN  Outcome: Ongoing  2/12/2020 0532 by Marc Cosme RN  Outcome: Ongoing  Note:   Pt A&Ox4. Pt uses call light appropriately and call light and bedside table remain in reach. Rails up x2 bed in lowest position. Bed alarm is on. Pt wears nonskid socks when standing or ambulating. Fall sign in place. Room remains free of clutter. Goal: Absence of physical injury  Description  Absence of physical injury  2/12/2020 0532 by Marc Cosme RN  Outcome: Ongoing     Problem: Activity:  Goal: Risk for activity intolerance will decrease  Description  Risk for activity intolerance will decrease  2/12/2020 1102 by Janie Cobian RN  Outcome: Ongoing  Note:   Admits to some discomfort with movement, but overall reports improvement and denies pain so far today. 2/12/2020 0532 by Marc Cosme RN  Outcome: Ongoing  Note:   Patient is encouraged to ambulate to and from the chair, bed, and bathroom. Patient will ambulate at least three times on this shift.  Patient is encouraged to do all ADLs within the patient's abilities. Pain medications given at least fifteen minutes before activity when appropriate. Problem: Bowel/Gastric:  Goal: Bowel function will improve  Description  Bowel function will improve  2/12/2020 1102 by James Bills RN  Outcome: Ongoing  Note:   No bm noted yet today. 2/12/2020 0532 by Jean-Pierre Barr RN  Outcome: Ongoing  Note:   X-ray scheduled of abdomen. Surgery consulted. Goal: Diagnostic test results will improve  Description  Diagnostic test results will improve  2/12/2020 0532 by Jean-Pierre Barr RN  Outcome: Ongoing  Goal: Occurrences of nausea will decrease  Description  Occurrences of nausea will decrease  2/12/2020 0532 by Jean-Pierre Barr RN  Outcome: Ongoing  Note:   Antiemetics ordered. Patient educated on positioning and other nonpharmacologic ways to ease nausea. Goal: Occurrences of vomiting will decrease  Description  Occurrences of vomiting will decrease  2/12/2020 0532 by Jean-Pierre Barr RN  Outcome: Ongoing     Problem: Fluid Volume:  Goal: Maintenance of adequate hydration will improve  Description  Maintenance of adequate hydration will improve  2/12/2020 1102 by James Bills RN  Outcome: Ongoing  2/12/2020 0532 by Jean-Pierre Barr RN  Outcome: Ongoing  Note:   Maintain hydration by drinking small amounts of clear fluids frequently. Vitals and pulses are checked twice per shift and PRN. Skin is being assessed and I&O's recorded. Labs are being drawn daily. Respiratory status being checked with vitals and PRN. IV fluids infusing.        Problem: Health Behavior:  Goal: Ability to state signs and symptoms to report to health care provider will improve  Description  Ability to state signs and symptoms to report to health care provider will improve  2/12/2020 1102 by James Bills RN  Outcome: Ongoing  2/12/2020 0532 by Jean-Pierre Barr RN  Outcome: Ongoing     Problem: Physical Regulation:  Goal: small amounts of clear fluids frequently. Vitals and pulses are checked twice per shift and PRN. Skin is being assessed and I&O's recorded. Labs are being drawn daily. Respiratory status being checked with vitals and PRN. IV fluids infusing.

## 2020-02-12 NOTE — CONSULTS
GENERAL SURGERY CONSULTATION- Inpatient      Patient's Name/ Date of Birth/ Gender: Gomez Lamb / 1940 (78 y.o.) / male     PCP: Irma Betancourt MD    History of present Illness:  Patient is a pleasant 78 y.o. male who came in through the ER with one day of abdominal distention and pain, no fevers, no diarrhea, felt a little better lying down, then went to eat lasagna, felt worse, vomited up everything and decided to go to ER. Here with wife. He has had no prior abdominal surgeries. No afib, not on blood thinners. No recent travel. Nonsmoker. Healthy overall. Denies melena or hematochezia. Has a small bowel movement yesterday. He says he does feel better since being admitted, no vomiting today. Ambulating in room and has voided twice so far. Past Medical History:  has a past medical history of Hyperlipidemia, Hypertension, and Skin cancer. Past Surgical History:   Past Surgical History:   Procedure Laterality Date    CATARACT REMOVAL WITH IMPLANT Right 03/05/2018    per Dr. Kaufman [place] Left 04/02/2018    COLONOSCOPY      CT XCAPSL CTRC RMVL INSJ IO LENS PROSTH W/O ECP Right 3/5/2018    EYE CATARACT EMULSIFICATION IOL IMPLANT performed by Ann Freitas DO at [facility] W/O ECP Left 4/2/2018    EYE CATARACT EMULSIFICATION IOL IMPLANT performed by Ann Freitas DO at [facility] History:  reports that he quit smoking about 32 years ago. He has never used smokeless tobacco. He reports current alcohol use. He reports that he does not use drugs. Family History: family history is not on file. Review of Systems:   General: Denies fever, chills, night sweats, weight loss, malaise, fatigue  HEENT: Denies sore throat, sinus problems, allergic rhinosinusitis  Card: Denies chest pain, palpitations, orthopnea/PND. HTN. Pulm: Denies cough, shortness of breath, dyspnea on exertion  GI:  per HPI.   : Denies polyuria, 02/12/2020    CO2 21 02/12/2020    BUN 29 02/12/2020    CREATININE 1.45 02/12/2020    GLUCOSE 161 02/12/2020    CALCIUM 9.2 02/12/2020     Lab Results   Component Value Date    ALKPHOS 63 02/11/2020    ALT 23 02/11/2020    AST 20 02/11/2020    PROT 7.5 02/11/2020    BILITOT 0.45 02/11/2020    LABALBU 5.0 02/11/2020     Lab Results   Component Value Date    LACTA 5.2 02/12/2020     No results found for: AMYLASE  Lab Results   Component Value Date    LIPASE 77 02/11/2020     No results found for: INR    Radiologic Studies:  EXAMINATION:   CT OF THE ABDOMEN AND PELVIS WITH CONTRAST 2/11/2020 10:33 pm       TECHNIQUE:   CT of the abdomen and pelvis was performed with the administration of   intravenous contrast. Multiplanar reformatted images are provided for review. Dose modulation, iterative reconstruction, and/or weight based adjustment of   the mA/kV was utilized to reduce the radiation dose to as low as reasonably   achievable.       COMPARISON:   None.       HISTORY:   ORDERING SYSTEM PROVIDED HISTORY: Abdominal pain   TECHNOLOGIST PROVIDED HISTORY:   Abdominal pain           FINDINGS:   Lower Chest: Moderate severe coronary calcifications.  Heart is grossly   unremarkable size.  Moderate-sized hiatal hernia identified.       Organs: Hypoattenuation liver suggesting fatty infiltration.  Spleen, adrenal   glands, and pancreas unremarkable.  Bilateral renal hypodensity identified   suggestive of cysts.  No hydronephrosis.       GI/Bowel: There dilated fluid-filled loops of small bowel identified.  These   most pronounced within the left upper to mid quadrant there is kinking of   some of the bowel loops identified within the region the right lower   quadrant.  There thickened edematous loops of small bowel identified within   the right lower abdomen with mesenteric congestion and infiltration along the   mesentery noted. .       There are multiple small bowel diverticula identified within left mid to at   upper quadrant with mild an adjacent inflammatory changes and adjacent   subcentimeter reactive adenopathy. Dorothe Ink is a small segments of thickened   small bowel loop with evidence of mural thickening with tiny focus of   questionable pneumatosis best seen on image 117 mild retained stool   throughout the colon.  Evidence of colonic diverticulosis.  Appendix   unremarkable.  Moderate severe sigmoid colonic diverticula.       Pelvis: Mild bladder distention.  Prostate 4.9 x 3.6 cm.       Peritoneum/Retroperitoneum: Moderate severe aortic vascular calcifications.       Bones/Soft Tissues: Multilevel degenerate changes of the lumbar spine           Impression   Thickened loops of small bowel with prominent air-fluid levels and mesenteric   congestion could be due to severe infectious or inflammatory enteritis versus   less likely ischemic etiology. Mario Feliciano definite closed-loop obstruction   identified in this exam.  Short-term clinical and radiologic follow-up is   recommended if symptoms progress or worsen.  Surgical consultation could be   beneficial.             Impressions/Recommendations:     Acute abdominal pain, acute dehydration, elevated creatinine  Enteritis. Lactic acidosis. Possible small bowel diverticulitis/mesenteritis/epiploic appendagitis/vs other. Continue aggressive IV fluids. NPO. Recheck lactic acid- pending later today. Monitor labs. Serial exam. IV antibiotics. Repeat CT scan abdomen/pelvis ORAL contrast only, no IV contrast, has been ordered for am for comparison. Needs NGT if any more nausea and vomiting. Patient currently stable. Further recommendations to follow,    Thank you Adonis Covington MD for allowing me to participate in the care of your patients.      Dr Hugo Montoya

## 2020-02-12 NOTE — PROGRESS NOTES
Nutrition Assessment    Type and Reason for Visit: Initial(Nutrition screen)    Nutrition Recommendations:   1. NPO as ordered  2. Upon diet resumption, consider 4 Carb controlled and consider low fiber. 3. Diet education for CHO controlled meal planning provided. Nutrition Assessment: Inadequate oral intake related to abdominal pain, nausea, vomiting as evidenced by NPO except for ice chips diet status, acute colitis. Pt reports having lasagna last evening, and had excruciating pain after eating. Pt had a nausea/vomiting episode yesterday and states that he feels like he vomited so much that his lunch came up too. Pt typically has 3 meals a day, and also has bowel movements daily. Pt states at least 2 daily, sometimes loose, and sometimes thinner. Pt is doing better today, and is awaiting results of CT of abdominal/pelvis. Labs reviewed. Blood sugar is acutely elevated. Note Pt does not feel hungry at this time. Pt is currently on ice chips and does not feel hungry at all right now. Pt is considered at high risk for nutrition and does not meet the guidelines for malnutrition per ASPEN / AND. Malnutrition Assessment:  · Malnutrition Status: No malnutrition  · Context: Acute illness or injury  · Findings of the 6 clinical characteristics of malnutrition (Minimum of 2 out of 6 clinical characteristics is required to make the diagnosis of moderate or severe Protein Calorie Malnutrition based on AND/ASPEN Guidelines):  1. Energy Intake-Greater than 75% of estimated energy requirement, Unable to assess    2. Weight Loss-1-2% loss, (2 days)  3. Fat Loss-No significant subcutaneous fat loss,    4. Muscle Loss-No significant muscle mass loss,    5. Fluid Accumulation-No significant fluid accumulation,    6.   Strength-Not measured    Recent Labs     02/11/20 2120 02/12/20  0540    141   K 3.8 3.8    101   CO2 26 21   BUN 28* 29*   CREATININE 1.55* 1.45*   GLUCOSE 127* 161*   ALT 23  -- ALKPHOS 63  --    GFR                            Lab Results   Component Value Date    LABALBU 5.0 02/11/2020        Nutrition Risk Level:  Moderate    Nutrient Needs:  · Estimated Daily Total Kcal: 1736-9338  · Estimated Daily Protein (g): 80-95(1.0-1.2)  · Estimated Daily Total Fluid (ml/day): 2000 ml+    Nutrition Diagnosis:   · Problem: Inadequate oral intake  · Etiology: related to Nausea, Vomiting(abdominal pain)     Signs and symptoms:  as evidenced by NPO status due to medical condition(acute colitis)    Objective Information:  · Nutrition-Focused Physical Findings: rounded abdomen  · Wound Type: None  · Current Nutrition Therapies:  · Oral Diet Orders: NPO   · Oral Diet intake: Unable to assess  · Oral Nutrition Supplement (ONS) Orders: None  · ONS intake: Unable to assess  · Anthropometric Measures:  · Ht: 6' (182.9 cm)   · Current Body Wt: 200 lb 2.8 oz (90.8 kg)  · Admission Body Wt: 200 lb 2.8 oz (90.8 kg)  · Usual Body Wt: 200 lb (90.7 kg)  · % Weight Change:  ,  1.5% x 2 days  · Ideal Body Wt: 178 lb (80.7 kg), % Ideal Body 112%  · BMI Classification: BMI 25.0 - 29.9 Overweight    Nutrition Interventions:   Continue NPO, Start oral diet  Continued Inpatient Monitoring, Education Completed, Coordination of Care    Nutrition Evaluation:   · Evaluation: Goals set   · Goals: PO > 75% of meals    · Monitoring: Nutrition Progression, Nausea or Vomiting, Diet Tolerance      Electronically signed by Emerald Loza RD, LD on 2/12/20 at 10:03 AM    Contact Number: [phone]

## 2020-02-12 NOTE — ED NOTES
Bed: 04  Expected date:   Expected time:   Means of arrival:   Comments:  benny Van RN  02/11/20 2107

## 2020-02-12 NOTE — ED PROVIDER NOTES
[facility]      Pt Name: Deangelo Maldonado  MRN: [id number]  [place] 1940  Date of evaluation: 2/11/2020  Provider: Oneil Veras PA-C    CHIEF COMPLAINT       Chief Complaint   Patient presents with    Abdominal Pain     diffuse, onset today with n/v.        HISTORY OF PRESENT ILLNESS    Deangelo Maldonado is a 78 y.o. male who presents to the emergency department from home with abdominal pain and vomiting. Patient states he has had abdominal pain starting like cramping this morning that has progressively worsened in intensity throughout the day he was feeling better around dinnertime so he ate dinner after which his pain became much worse and he began to vomit. He now describes sharp pain that comes in waves throughout his abdomen mostly in the central abdomen region with associated episodes of nausea and vomiting. Denies diarrhea denies chest pain shortness of breath or urinary symptoms. Patient denies previous abdominal surgeries. Triage notes and Nursing notes were reviewed by myself. Any discrepancies are addressed above.     PAST MEDICAL HISTORY     Past Medical History:   Diagnosis Date    Hyperlipidemia     Hypertension     Skin cancer        SURGICAL HISTORY       Past Surgical History:   Procedure Laterality Date    CATARACT REMOVAL WITH IMPLANT Right 03/05/2018    per Dr. Bocanegra Gene Left 04/02/2018    COLONOSCOPY      DE XCAPSL CTRC RMVL INSJ IO LENS PROSTH W/O ECP Right 3/5/2018    EYE CATARACT EMULSIFICATION IOL IMPLANT performed by Heather Gowers, DO at [facility] W/O ECP Left 4/2/2018    EYE CATARACT EMULSIFICATION IOL IMPLANT performed by Heather Gowers, DO at [facility]       Discharge Medication List as of 2/20/2020 10:00 AM      CONTINUE these medications which have NOT CHANGED    Details   losartan-hydrochlorothiazide (HYZAAR) 100-25 MG per tablet Take 1 tablet by mouth dailyHistorical Med      loratadine (CLARITIN) 10 MG capsule Take 10 mg by mouth dailyHistorical Med      aspirin 81 MG tablet Take 81 mg by mouth dailyHistorical Med             ALLERGIES     Terramycin [oxytetracycline] and Lactose    FAMILY HISTORY     History reviewed. No pertinent family history. SOCIAL HISTORY       Social History     Socioeconomic History    Marital status:      Spouse name: None    Number of children: None    Years of education: None    Highest education level: None   Occupational History    None   Social Needs    Financial resource strain: None    Food insecurity:     Worry: None     Inability: None    Transportation needs:     Medical: None     Non-medical: None   Tobacco Use    Smoking status: Former Smoker     Last attempt to quit:      Years since quittin.1    Smokeless tobacco: Never Used   Substance and Sexual Activity    Alcohol use: Yes     Comment: occ.  Drug use: No    Sexual activity: None   Lifestyle    Physical activity:     Days per week: None     Minutes per session: None    Stress: None   Relationships    Social connections:     Talks on phone: None     Gets together: None     Attends Anabaptism service: None     Active member of club or organization: None     Attends meetings of clubs or organizations: None     Relationship status:     Intimate partner violence:     Fear of current or ex partner: None     Emotionally abused: None     Physically abused: None     Forced sexual activity: None   Other Topics Concern    None   Social History Narrative    None       REVIEW OF SYSTEMS     Review of Systems  Except as noted above the remainder of the review of systems was reviewed and is negative.      SCREENINGS    Faith Coma Scale  Eye Opening: Spontaneous  Best Verbal Response: Oriented  Best Motor Response: Obeys commands  Faith Coma Scale Score: 15      PHYSICAL EXAM    (up to 7 for level 4, 8 or more for components:    Platelet, Fluorescence 96 (*)     All other components within normal limits   CBC - Abnormal; Notable for the following components:    RBC 4.08 (*)     Hemoglobin 12.5 (*)     Hematocrit 36.4 (*)     All other components within normal limits   BASIC METABOLIC PANEL - Abnormal; Notable for the following components:    Glucose 104 (*)     Calcium 8.1 (*)     Potassium 3.2 (*)     All other components within normal limits   IMMATURE PLATELET FRACTION - Abnormal; Notable for the following components:    Platelet, Fluorescence 112 (*)     All other components within normal limits   CBC - Abnormal; Notable for the following components:    RBC 3.84 (*)     Hemoglobin 11.6 (*)     Hematocrit 33.3 (*)     All other components within normal limits   BASIC METABOLIC PANEL - Abnormal; Notable for the following components:    Calcium 7.7 (*)     Potassium 3.4 (*)     Chloride 109 (*)     CO2 18 (*)     All other components within normal limits   IMMATURE PLATELET FRACTION - Abnormal; Notable for the following components:    Platelet, Fluorescence 106 (*)     All other components within normal limits   BRAIN NATRIURETIC PEPTIDE - Abnormal; Notable for the following components:    Pro-BNP 1,831 (*)     All other components within normal limits   CBC - Abnormal; Notable for the following components:    RBC 3.86 (*)     Hemoglobin 11.7 (*)     Hematocrit 33.1 (*)     MCHC 35.3 (*)     Platelets 286 (*)     All other components within normal limits   BASIC METABOLIC PANEL - Abnormal; Notable for the following components:    Glucose 101 (*)     Calcium 8.1 (*)     Sodium 134 (*)     Potassium 3.3 (*)     CO2 19 (*)     All other components within normal limits   CBC - Abnormal; Notable for the following components:    Hematocrit 37.8 (*)     MCHC 35.2 (*)     All other components within normal limits   BASIC METABOLIC PANEL - Abnormal; Notable for the following components:    Glucose 102 (*)     Bun/Cre Ratio 8 (*) iopamidol (ISOVUE-370) 76 % injection 75 mL (75 mLs Intravenous Given 2/11/20 2237)   morphine injection 4 mg (4 mg Intravenous Given 2/11/20 2344)   ciprofloxacin (CIPRO) IVPB 400 mg (0 mg Intravenous Stopped 2/12/20 0050)   metronidazole (FLAGYL) 500 mg in NaCl 100 mL IVPB premix (0 mg Intravenous Stopped 2/12/20 0050)   ondansetron (ZOFRAN) injection 4 mg (4 mg Intravenous Given 2/12/20 0003)   iopamidol (ISOVUE-370) 76 % injection 18 mL (18 mLs Other Given 2/13/20 0829)   furosemide (LASIX) injection 20 mg (20 mg Intravenous Given 2/16/20 2025)   furosemide (LASIX) injection 20 mg (20 mg Intravenous Given 2/17/20 1155)   potassium chloride (KLOR-CON M) extended release tablet 20 mEq (20 mEq Oral Given 2/20/20 1030)       CONSULTS: (None if blank)  IP CONSULT TO GENERAL SURGERY    Procedures: (None if blank)       CLINICAL       1. Generalized abdominal pain    2. Infectious enteritis, unspecified infectious agent    3. Dehydration    4. SBO (small bowel obstruction) [facility])          DISPOSITION/PLAN   DISPOSITION        PATIENT REFERRED TO:  MD Kelli Penn 53.  [address]  [phone]    Go on 2/27/2020  at 12:45 PM for hospital follow up     Lyanne Sacks, DO  [facility] [address]  [phone]    In 2 weeks  repeat CT outpatient.  also, Dr. Theodore Love recommends colonoscopy outpt      DISCHARGE MEDICATIONS:  Discharge Medication List as of 2/20/2020 10:00 AM      START taking these medications    Details   amLODIPine (NORVASC) 2.5 MG tablet Take 1 tablet by mouth daily, Disp-30 tablet, R-3Normal      potassium chloride (KLOR-CON M) 20 MEQ extended release tablet Take 1 tablet by mouth 2 times daily, Disp-60 tablet, R-3Normal                    (Please note that portions of this note were completed with a voice recognition program.  Efforts were made to edit the dictations but occasionallywords are mis-transcribed.)      Marysol Marcelino II,

## 2020-02-12 NOTE — PROGRESS NOTES
Patient resting in bed comfortably. Continues to deny pain at rest. Admits to discomfort when movement/changing positions but denies any at rest. Wife at bedside. Updated on Dr. Kenji Das consult. Denies needs at this time. Call light in reach.

## 2020-02-12 NOTE — H&P
[address], [place] [address]                              HISTORY AND PHYSICAL    PATIENT NAME: Smiley Bar                     :        1940  MED REC NO:   [id number]                              ROOM:       6281  ACCOUNT NO:   [de-identified]                           ADMIT DATE: 2020  PROVIDER:     Mac Rocha    CHIEF COMPLAINT:  Abdominal pain. HISTORY OF PRESENT ILLNESS:  The problem started yesterday when he  noticed there was a slight amount of abdominal pain on the left side of  the abdomen and a little bit more in the suprapubic area. The pain  gradually got worse and seemed they gotten worse after he had a  breakfast.  It was still not severe and the pain seemed to be under  reasonable control. He had some flatus, which seemed to happen  frequently and he also had some belching. After that, he had [employer]  dinner and following that, the pain got much worse. He rates the  severity at 8/10. The pain is again in the left side of the abdomen and  in the suprapubic area, and seemed to have spread to the right side  also. The patient had several vomiting, at least five large ones and  several small ones. It contained only undigested food. He had a few  BMs and there was no blood in the stool. He felt somewhat cold, chilly,  but no fever. The patient came to the emergency room and he was  subsequently admitted from there. PAST MEDICAL HISTORY:  He is known to have hypertension and  hyperlipidemia, and he declines to take the lipid medications. He has a  remote history of skin cancer. SOCIAL HISTORY:  Former smoker. Retired . . Lives  with the wife. REVIEW OF SYSTEMS:  HEAD:  No frequent headaches. LUNGS:  No wheezing or coughing. HEART:  No chest pain or palpitations. ABDOMEN:  As in the history of the present illness. GENITOURINARY:  No dysuria or frequency.   CNS:

## 2020-02-12 NOTE — ED PROVIDER NOTES
[facility] AT THE [facility] MED SURG  eMERGENCY dEPARTMENT eNCOUnter      Pt Name: Ortega Penn  MRN: [id number]  [place] 1940  Date of evaluation: 2/11/2020  Provider: Moisés Harper MD    CHIEF COMPLAINT     Chief Complaint   Patient presents with    Abdominal Pain     diffuse, onset today with n/v.        HISTORY OF PRESENT ILLNESS    Ortega Penn is a 78 y.o. male who presents to the emergency department for evaluation of abdominal pain. Patient was signed out to me by Espiridion Libman, PA, pending CT abdomen pelvis results. Patient complains of abdominal pain across his abdomen. Onset earlier today. Symptoms have persisted. He has nausea and vomiting. He describes the pain as sharp. Rates the pain as 2/10. He denies any diarrhea. He denies any fevers. He denies any previous abdominal surgery.         PAST MEDICAL HISTORY     Past Medical History:   Diagnosis Date    Hyperlipidemia     Hypertension     Skin cancer        SURGICAL HISTORY       Past Surgical History:   Procedure Laterality Date    CATARACT REMOVAL WITH IMPLANT Right 03/05/2018    per Dr. Melanie Schuster Left 04/02/2018    COLONOSCOPY      DE XCAPSL CTRC RMVL INSJ IO LENS PROSTH W/O ECP Right 3/5/2018    EYE CATARACT EMULSIFICATION IOL IMPLANT performed by Sai Wolff DO at [facility] RMVL INSJ IO LENS PROSTH W/O ECP Left 4/2/2018    EYE CATARACT EMULSIFICATION IOL IMPLANT performed by Sai Wolff DO at [facility]       Current Discharge Medication List      CONTINUE these medications which have NOT CHANGED    Details   losartan-hydrochlorothiazide (HYZAAR) 100-25 MG per tablet Take 1 tablet by mouth daily      loratadine (CLARITIN) 10 MG capsule Take 10 mg by mouth daily      aspirin 81 MG tablet Take 81 mg by mouth daily      lisinopril (PRINIVIL;ZESTRIL) 40 MG tablet Take 40 mg by mouth daily      amLODIPine (NORVASC) 2.5 MG tablet Take 2.5 mg by mouth daily hydrochlorothiazide (MICROZIDE) 12.5 MG capsule Take 12.5 mg by mouth every morning             ALLERGIES     Terramycin [oxytetracycline]    FAMILY HISTORY     History reviewed. No pertinent family history. SOCIAL HISTORY       Social History     Socioeconomic History    Marital status:      Spouse name: None    Number of children: None    Years of education: None    Highest education level: None   Occupational History    None   Social Needs    Financial resource strain: None    Food insecurity:     Worry: None     Inability: None    Transportation needs:     Medical: None     Non-medical: None   Tobacco Use    Smoking status: Former Smoker     Last attempt to quit:      Years since quittin.1    Smokeless tobacco: Never Used   Substance and Sexual Activity    Alcohol use: Yes     Comment: occ.  Drug use: No    Sexual activity: None   Lifestyle    Physical activity:     Days per week: None     Minutes per session: None    Stress: None   Relationships    Social connections:     Talks on phone: None     Gets together: None     Attends Yazdanism service: None     Active member of club or organization: None     Attends meetings of clubs or organizations: None     Relationship status: None    Intimate partner violence:     Fear of current or ex partner: None     Emotionally abused: None     Physically abused: None     Forced sexual activity: None   Other Topics Concern    None   Social History Narrative    None       REVIEW OF SYSTEMS       Review of Systems   Constitutional: Negative for fever. HENT: Negative for congestion. Eyes: Negative for pain. Respiratory: Negative for shortness of breath. Cardiovascular: Negative for chest pain. Gastrointestinal: Positive for abdominal pain. Genitourinary: Negative for dysuria. Skin: Negative for rash. Allergic/Immunologic: Negative for immunocompromised state. Neurological: Negative for headaches.        PHYSICAL EXAM LABS:  Labs Reviewed   CBC WITH AUTO DIFFERENTIAL - Abnormal; Notable for the following components:       Result Value    Hemoglobin 17.4 (*)     Seg Neutrophils 78 (*)     Lymphocytes 20 (*)     Monocytes 1 (*)     Eosinophils % 0 (*)     Absolute Mono # 0.04 (*)     All other components within normal limits   LIPASE - Abnormal; Notable for the following components:    Lipase 77 (*)     All other components within normal limits   LACTATE, SEPSIS - Abnormal; Notable for the following components:    Lactic Acid, Sepsis 2.5 (*)     All other components within normal limits   LACTATE, SEPSIS - Abnormal; Notable for the following components:    Lactic Acid, Sepsis 3.2 (*)     All other components within normal limits   COMPREHENSIVE METABOLIC PANEL W/ REFLEX TO MG FOR LOW K - Abnormal; Notable for the following components:    Glucose 127 (*)     BUN 28 (*)     CREATININE 1.55 (*)     GFR Non- 43 (*)     GFR  52 (*)     All other components within normal limits   URINALYSIS   CBC   BASIC METABOLIC PANEL   LACTIC ACID, PLASMA       All other labs were within normal range or not returned as of this dictation. EMERGENCY DEPARTMENT COURSE and Medical Decision Making: On evaluation, patient is in no distress. Patient rates pain at a 2. He states as long as he is lying still but is very mild. With mild pain as long as he is not moving and soft abdomen, unlikely ischemic bowel. CT does show thickened loops of small bowel with prominent air-fluid levels and mesenteric congestion could be due to severe infectious or inflammatory enteritis. Less likely ischemic bowel. As mentioned earlier, patient's pain is mild and worsens with movement. Patient's past medical history includes to risk factors for vascular disease with hypertension hyperlipidemia. Patient was started on Cipro and Flagyl. Nausea has improved with Zofran.   Patient would benefit from admission and further Admit 02/11/2020 11:43:27 PM      PATIENT REFERRED TO:  Cary Dakin, MD [address] [id number] [phone]            DISCHARGE MEDICATIONS:  Current Discharge Medication List               Crispin Elliott MD (electronically signed)  AttendingEmergency Department Provider           Crispin Elliott MD  02/12/20 6324

## 2020-02-13 ENCOUNTER — APPOINTMENT (OUTPATIENT)
Dept: CT IMAGING | Age: 80
DRG: 388 | End: 2020-02-13
Payer: MEDICARE

## 2020-02-13 LAB
ABSOLUTE EOS #: <0.03 K/UL (ref 0–0.44)
ABSOLUTE IMMATURE GRANULOCYTE: 0.04 K/UL (ref 0–0.3)
ABSOLUTE LYMPH #: 0.8 K/UL (ref 1.1–3.7)
ABSOLUTE MONO #: 0.39 K/UL (ref 0.1–1.2)
ANION GAP SERPL CALCULATED.3IONS-SCNC: 11 MMOL/L (ref 9–17)
BASOPHILS # BLD: 0 % (ref 0–2)
BASOPHILS ABSOLUTE: <0.03 K/UL (ref 0–0.2)
BUN BLDV-MCNC: 29 MG/DL (ref 8–23)
BUN/CREAT BLD: 22 (ref 9–20)
CALCIUM SERPL-MCNC: 8.2 MG/DL (ref 8.6–10.4)
CHLORIDE BLD-SCNC: 105 MMOL/L (ref 98–107)
CO2: 24 MMOL/L (ref 20–31)
CREAT SERPL-MCNC: 1.31 MG/DL (ref 0.7–1.2)
DIFFERENTIAL TYPE: ABNORMAL
EOSINOPHILS RELATIVE PERCENT: 0 % (ref 1–4)
GFR AFRICAN AMERICAN: >60 ML/MIN
GFR NON-AFRICAN AMERICAN: 53 ML/MIN
GFR SERPL CREATININE-BSD FRML MDRD: ABNORMAL ML/MIN/{1.73_M2}
GFR SERPL CREATININE-BSD FRML MDRD: ABNORMAL ML/MIN/{1.73_M2}
GLUCOSE BLD-MCNC: 132 MG/DL (ref 70–99)
HCT VFR BLD CALC: 37.7 % (ref 40.7–50.3)
HEMOGLOBIN: 12.5 G/DL (ref 13–17)
IMMATURE GRANULOCYTES: 0 %
LYMPHOCYTES # BLD: 7 % (ref 24–43)
MCH RBC QN AUTO: 30.5 PG (ref 25.2–33.5)
MCHC RBC AUTO-ENTMCNC: 33.2 G/DL (ref 28.4–34.8)
MCV RBC AUTO: 92 FL (ref 82.6–102.9)
MONOCYTES # BLD: 3 % (ref 3–12)
NRBC AUTOMATED: 0 PER 100 WBC
PDW BLD-RTO: 13.4 % (ref 11.8–14.4)
PLATELET # BLD: ABNORMAL K/UL (ref 138–453)
PLATELET ESTIMATE: ABNORMAL
PLATELET, FLUORESCENCE: 111 K/UL (ref 138–453)
PLATELET, IMMATURE FRACTION: 3.2 % (ref 1.1–10.3)
PMV BLD AUTO: ABNORMAL FL (ref 8.1–13.5)
POTASSIUM SERPL-SCNC: 3.8 MMOL/L (ref 3.7–5.3)
RBC # BLD: 4.1 M/UL (ref 4.21–5.77)
RBC # BLD: ABNORMAL 10*6/UL
SEG NEUTROPHILS: 89 % (ref 36–65)
SEGMENTED NEUTROPHILS ABSOLUTE COUNT: 10.39 K/UL (ref 1.5–8.1)
SODIUM BLD-SCNC: 140 MMOL/L (ref 135–144)
WBC # BLD: 11.6 K/UL (ref 3.5–11.3)
WBC # BLD: ABNORMAL 10*3/UL

## 2020-02-13 PROCEDURE — 97161 PT EVAL LOW COMPLEX 20 MIN: CPT

## 2020-02-13 PROCEDURE — 6360000004 HC RX CONTRAST MEDICATION: Performed by: SURGERY

## 2020-02-13 PROCEDURE — 85025 COMPLETE CBC W/AUTO DIFF WBC: CPT

## 2020-02-13 PROCEDURE — 99232 SBSQ HOSP IP/OBS MODERATE 35: CPT | Performed by: SURGERY

## 2020-02-13 PROCEDURE — 85055 RETICULATED PLATELET ASSAY: CPT

## 2020-02-13 PROCEDURE — 1200000000 HC SEMI PRIVATE

## 2020-02-13 PROCEDURE — 97162 PT EVAL MOD COMPLEX 30 MIN: CPT

## 2020-02-13 PROCEDURE — 97165 OT EVAL LOW COMPLEX 30 MIN: CPT

## 2020-02-13 PROCEDURE — 2500000003 HC RX 250 WO HCPCS: Performed by: INTERNAL MEDICINE

## 2020-02-13 PROCEDURE — 2580000003 HC RX 258: Performed by: SURGERY

## 2020-02-13 PROCEDURE — 6360000002 HC RX W HCPCS: Performed by: SURGERY

## 2020-02-13 PROCEDURE — 36415 COLL VENOUS BLD VENIPUNCTURE: CPT

## 2020-02-13 PROCEDURE — 74176 CT ABD & PELVIS W/O CONTRAST: CPT

## 2020-02-13 PROCEDURE — 80048 BASIC METABOLIC PNL TOTAL CA: CPT

## 2020-02-13 PROCEDURE — 2580000003 HC RX 258: Performed by: INTERNAL MEDICINE

## 2020-02-13 PROCEDURE — 97116 GAIT TRAINING THERAPY: CPT

## 2020-02-13 PROCEDURE — 6360000002 HC RX W HCPCS: Performed by: INTERNAL MEDICINE

## 2020-02-13 RX ADMIN — DEXTROSE AND SODIUM CHLORIDE: 5; 900 INJECTION, SOLUTION INTRAVENOUS at 07:28

## 2020-02-13 RX ADMIN — PIPERACILLIN AND TAZOBACTAM 3.38 G: 3; .375 INJECTION, POWDER, FOR SOLUTION INTRAVENOUS at 21:11

## 2020-02-13 RX ADMIN — DEXTROSE AND SODIUM CHLORIDE: 5; 900 INJECTION, SOLUTION INTRAVENOUS at 23:35

## 2020-02-13 RX ADMIN — METRONIDAZOLE 500 MG: 500 INJECTION, SOLUTION INTRAVENOUS at 01:57

## 2020-02-13 RX ADMIN — ENOXAPARIN SODIUM 40 MG: 40 INJECTION, SOLUTION INTRAVENOUS; SUBCUTANEOUS at 08:37

## 2020-02-13 RX ADMIN — IOPAMIDOL 18 ML: 755 INJECTION, SOLUTION INTRAVENOUS at 08:29

## 2020-02-13 RX ADMIN — METRONIDAZOLE 500 MG: 500 INJECTION, SOLUTION INTRAVENOUS at 07:28

## 2020-02-13 RX ADMIN — PIPERACILLIN AND TAZOBACTAM 3.38 G: 3; .375 INJECTION, POWDER, FOR SOLUTION INTRAVENOUS at 11:50

## 2020-02-13 ASSESSMENT — PAIN DESCRIPTION - LOCATION
LOCATION: ABDOMEN

## 2020-02-13 ASSESSMENT — PAIN DESCRIPTION - PAIN TYPE
TYPE: ACUTE PAIN

## 2020-02-13 ASSESSMENT — PAIN DESCRIPTION - DESCRIPTORS
DESCRIPTORS: ACHING
DESCRIPTORS: ACHING
DESCRIPTORS: STABBING

## 2020-02-13 ASSESSMENT — PAIN DESCRIPTION - ONSET: ONSET: OTHER (COMMENT)

## 2020-02-13 ASSESSMENT — PAIN DESCRIPTION - FREQUENCY: FREQUENCY: INTERMITTENT

## 2020-02-13 ASSESSMENT — PAIN SCALES - GENERAL: PAINLEVEL_OUTOF10: 0

## 2020-02-13 NOTE — PROGRESS NOTES
Physical Therapy    Facility/Department: [facility] AT THE [facility] MED SURG  Initial Assessment    NAME: Darling Blank  : 1940  MRN: [id number]    Date of Service: 2020    Discharge Recommendations:  Continue to assess pending progress        Assessment   Body structures, Functions, Activity limitations: Decreased functional mobility ; Decreased high-level IADLs;Decreased endurance;Decreased strength;Decreased balance  Assessment: pt is an 78year old that was admitted for colitis. pt presents with general weakness and decreased ambulation tolerance. pt will benefit from skilled PT in order to address these deficits. Treatment Diagnosis: general weakness   Prognosis: Good  Decision Making: Low Complexity  PT Education: Goals;PT Role;Plan of Care  REQUIRES PT FOLLOW UP: Yes  Activity Tolerance  Activity Tolerance: Patient Tolerated treatment well       Patient Diagnosis(es): The primary encounter diagnosis was Generalized abdominal pain. A diagnosis of Infectious enteritis, unspecified infectious agent was also pertinent to this visit. has a past medical history of Hyperlipidemia, Hypertension, and Skin cancer. has a past surgical history that includes Colonoscopy; Cataract removal with implant (Right, 2018); pr xcapsl ctrc rmvl insj io lens prosth w/o ecp (Right, 3/5/2018); Cataract removal with implant (Left, 2018); and pr xcapsl ctrc rmvl insj io lens prosth w/o ecp (Left, 2018).     Restrictions  Restrictions/Precautions  Restrictions/Precautions: General Precautions  Vision/Hearing  Vision: Within Functional Limits  Hearing: Within functional limits     Subjective  General  Chart Reviewed: Yes  Response To Previous Treatment: Not applicable  Family / Caregiver Present: No  Referring Practitioner: Dr. Stevo Glass   Referral Date : 20  Diagnosis: colitis   Subjective  Subjective: pt reports slight discomfort in his stomach   Pain Screening  Patient Currently in Pain: Yes

## 2020-02-13 NOTE — PROGRESS NOTES
to 6/10  General Comment  Comments: Pt lying supine in bed with wife present. Agreeable to OT evaluation after education  Patient Currently in Pain: Yes  Pain Assessment  Pain Assessment: 0-10  Pain Type: Acute pain  Pain Location: Abdomen  Pain Descriptors: Aching  Pain Frequency: Intermittent  Pain Onset: Other (Comment)(pain during movement)  Vital Signs  Temp: 99.2 °F (37.3 °C)  Temp Source: Temporal  Pulse: 75  Heart Rate Source: Monitor  Resp: 16  BP: 124/68  BP Location: Left upper arm  BP Upper/Lower: Upper  MAP (mmHg): 89  Patient Position: Supine  Level of Consciousness: Alert  MEWS Score: 1  Patient Currently in Pain: Yes  Oxygen Therapy  SpO2: 94 %  Pulse Oximeter Device Mode: Intermittent  Pulse Oximeter Device Location: Finger  O2 Device: None (Room air)  Social/Functional History  Social/Functional History  Lives With: Spouse  Type of Home: House  Home Layout: One level(uses basement stairs daily)  Home Access: Stairs to enter with rails  Entrance Stairs - Number of Steps: 3  Entrance Stairs - Rails: Both  Bathroom Shower/Tub: Tub/Shower unit  Bathroom Toilet: Standard  Bathroom Accessibility: Accessible  Home Equipment: Standard walker  ADL Assistance: Independent  Homemaking Assistance: Independent  Homemaking Responsibilities: Yes  Ambulation Assistance: Independent  Transfer Assistance: Independent  Active :  Yes  Additional Comments: independent with all ADLs and IADLs without an AD       Objective   Vision: Impaired  Vision Exceptions: Wears glasses for reading  Hearing: Within functional limits    Orientation  Overall Orientation Status: Within Functional Limits     Balance  Sitting Balance: Independent  Standing Balance: Independent  Standing Balance  Time: 1-2 minutes  Activity: functional mobility  Comment: demo'd good dynamic standing balance independently without AD   Functional Mobility  Functional - Mobility Device: No device  Activity: Other(functional mobility within room)  Assist

## 2020-02-13 NOTE — PROGRESS NOTES
2/13/20  11:30AM    Addendum to this morning's note evaluation:    CT reviewed. Spoke with RN. IV cipro and flagyl dc'd, started IV zosyn. No obstruction, more ileus pattern from enteritis (n prior surgeries abdomen). Will do clear liquids only- no advancing of diet yet. Stable. Not surgical. Continue conservative management. If worsens, needs GI opinion. If improves, then anticipate home over weekend with po antibiotics (cipro and flagyl po ok for discharge) and continuing liquid diet until pain and bloating subside. Will re-assess patient tomorrow.     Dr Mark Albarran

## 2020-02-13 NOTE — PLAN OF CARE
Problem: Pain:  Description  Pain management should include both nonpharmacologic and pharmacologic interventions. Goal: Pain level will decrease  Description  Pain level will decrease  2/13/2020 1009 by Candance Gauss, RN  Outcome: Ongoing  Note:   Pain assessed every 4 hours. Patient is able to communicate with staff the need for pain interventions. Patient currently complains of some tenderness and discomfort. Offered pain medication which patient declines at this time. Will continue to monitor. 2/13/2020 0011 by Becky Solis RN  Outcome: Ongoing  Note:   Pain assessed every 4 hours. Patient is able to communicate with staff the need for pain interventions. Patient currently resting quietly. Will continue to monitor. Goal: Control of acute pain  Description  Control of acute pain  2/13/2020 1009 by Candance Gauss, RN  Outcome: Ongoing  2/13/2020 0011 by Becky Solis RN  Outcome: Ongoing  Goal: Control of chronic pain  Description  Control of chronic pain  2/13/2020 1009 by Candance Gauss, RN  Outcome: Ongoing  2/13/2020 0011 by Becky Solis RN  Outcome: Ongoing     Problem: Falls - Risk of:  Goal: Will remain free from falls  Description  Will remain free from falls  2/13/2020 1009 by Candance Gauss, RN  Outcome: Ongoing  Note:   Patient is alert and oriented and has demonstrated the use of using the call light for assistance before getting up. Education has been provided to defer the use of the bed alarm and/or restraint free alarm as the patient has shown competency in calling for assistance and verbalizing the risk. 2/13/2020 0011 by Becky Solis RN  Outcome: Ongoing  Note:   Fall assessment completed daily. Bed in lowest position. Call light within reach. Falling star posted to outside of door. Fall risk sticker in place on ID band. Side rails up 2/4. Bed alarm on for safety. Patient ambulates stand by assistance. Will continue to monitor.     Goal: Absence of physical Imbalance:  Goal: Absence of imbalanced fluid volume signs and symptoms  Description  Absence of imbalanced fluid volume signs and symptoms  2/13/2020 1009 by Laurence Keita RN  Outcome: Ongoing  2/13/2020 0011 by Ruben Wray RN  Outcome: Ongoing     Problem: Musculor/Skeletal Functional Status  Goal: Highest potential functional level  Outcome: Ongoing  Goal: Absence of falls  Outcome: Ongoing

## 2020-02-13 NOTE — PLAN OF CARE
nausea will decrease  Outcome: Ongoing  Goal: Occurrences of vomiting will decrease  Description  Occurrences of vomiting will decrease  Outcome: Ongoing     Problem: Fluid Volume:  Goal: Maintenance of adequate hydration will improve  Description  Maintenance of adequate hydration will improve  2/13/2020 0011 by Ruben Wray RN  Outcome: Ongoing  Note:   Accurate intake and output measured. Will continue to assess. 2/12/2020 1102 by Nena Wiggins RN  Outcome: Ongoing     Problem: Health Behavior:  Goal: Ability to state signs and symptoms to report to health care provider will improve  Description  Ability to state signs and symptoms to report to health care provider will improve  2/13/2020 0011 by Ruben Wray RN  Outcome: Ongoing  2/12/2020 1102 by Nena Wiggins RN  Outcome: Ongoing     Problem: Physical Regulation:  Goal: Complications related to the disease process, condition or treatment will be avoided or minimized  Description  Complications related to the disease process, condition or treatment will be avoided or minimized  2/13/2020 0011 by Ruben Wray RN  Outcome: Ongoing  2/12/2020 1102 by Nena Wiggins RN  Outcome: Ongoing  Goal: Ability to maintain clinical measurements within normal limits will improve  Description  Ability to maintain clinical measurements within normal limits will improve  Outcome: Ongoing     Problem: Sensory:  Goal: Pain level will decrease  Description  Pain level will decrease  2/13/2020 0011 by Ruben Wray RN  Outcome: Ongoing  Note:   Pain assessed every 4 hours. Patient is able to communicate with staff the need for pain interventions. Patient currently resting quietly. Will continue to monitor.     2/12/2020 1102 by Nena Wiggins RN  Outcome: Ongoing  Goal: Ability to identify factors that increase the pain will improve  Description  Ability to identify factors that increase the pain will improve  Outcome: Ongoing  Goal: Ability to notify

## 2020-02-14 LAB
ANION GAP SERPL CALCULATED.3IONS-SCNC: 12 MMOL/L (ref 9–17)
BUN BLDV-MCNC: 19 MG/DL (ref 8–23)
BUN/CREAT BLD: 17 (ref 9–20)
CALCIUM SERPL-MCNC: 7.9 MG/DL (ref 8.6–10.4)
CHLORIDE BLD-SCNC: 103 MMOL/L (ref 98–107)
CO2: 20 MMOL/L (ref 20–31)
CREAT SERPL-MCNC: 1.14 MG/DL (ref 0.7–1.2)
GFR AFRICAN AMERICAN: >60 ML/MIN
GFR NON-AFRICAN AMERICAN: >60 ML/MIN
GFR SERPL CREATININE-BSD FRML MDRD: ABNORMAL ML/MIN/{1.73_M2}
GFR SERPL CREATININE-BSD FRML MDRD: ABNORMAL ML/MIN/{1.73_M2}
GLUCOSE BLD-MCNC: 125 MG/DL (ref 70–99)
HCT VFR BLD CALC: 36 % (ref 40.7–50.3)
HEMOGLOBIN: 12.5 G/DL (ref 13–17)
MCH RBC QN AUTO: 30.9 PG (ref 25.2–33.5)
MCHC RBC AUTO-ENTMCNC: 34.7 G/DL (ref 28.4–34.8)
MCV RBC AUTO: 88.9 FL (ref 82.6–102.9)
NRBC AUTOMATED: 0 PER 100 WBC
PDW BLD-RTO: 13.1 % (ref 11.8–14.4)
PLATELET # BLD: ABNORMAL K/UL (ref 138–453)
PLATELET, FLUORESCENCE: 96 K/UL (ref 138–453)
PLATELET, IMMATURE FRACTION: 4.7 % (ref 1.1–10.3)
PMV BLD AUTO: ABNORMAL FL (ref 8.1–13.5)
POTASSIUM SERPL-SCNC: 4 MMOL/L (ref 3.7–5.3)
RBC # BLD: 4.05 M/UL (ref 4.21–5.77)
SODIUM BLD-SCNC: 135 MMOL/L (ref 135–144)
WBC # BLD: 10.1 K/UL (ref 3.5–11.3)

## 2020-02-14 PROCEDURE — 6370000000 HC RX 637 (ALT 250 FOR IP): Performed by: INTERNAL MEDICINE

## 2020-02-14 PROCEDURE — 99231 SBSQ HOSP IP/OBS SF/LOW 25: CPT | Performed by: SURGERY

## 2020-02-14 PROCEDURE — 85055 RETICULATED PLATELET ASSAY: CPT

## 2020-02-14 PROCEDURE — 80048 BASIC METABOLIC PNL TOTAL CA: CPT

## 2020-02-14 PROCEDURE — 85027 COMPLETE CBC AUTOMATED: CPT

## 2020-02-14 PROCEDURE — 36415 COLL VENOUS BLD VENIPUNCTURE: CPT

## 2020-02-14 PROCEDURE — 6360000002 HC RX W HCPCS: Performed by: SURGERY

## 2020-02-14 PROCEDURE — 1200000000 HC SEMI PRIVATE

## 2020-02-14 PROCEDURE — 2580000003 HC RX 258: Performed by: INTERNAL MEDICINE

## 2020-02-14 PROCEDURE — 2580000003 HC RX 258: Performed by: SURGERY

## 2020-02-14 RX ORDER — ACETAMINOPHEN 325 MG/1
650 TABLET ORAL EVERY 4 HOURS PRN
Status: DISCONTINUED | OUTPATIENT
Start: 2020-02-14 | End: 2020-02-20 | Stop reason: HOSPADM

## 2020-02-14 RX ORDER — SODIUM CHLORIDE 9 MG/ML
INJECTION, SOLUTION INTRAVENOUS CONTINUOUS
Status: DISCONTINUED | OUTPATIENT
Start: 2020-02-14 | End: 2020-02-16

## 2020-02-14 RX ADMIN — ACETAMINOPHEN 650 MG: 325 TABLET, FILM COATED ORAL at 21:51

## 2020-02-14 RX ADMIN — PIPERACILLIN AND TAZOBACTAM 3.38 G: 3; .375 INJECTION, POWDER, FOR SOLUTION INTRAVENOUS at 11:22

## 2020-02-14 RX ADMIN — PIPERACILLIN AND TAZOBACTAM 3.38 G: 3; .375 INJECTION, POWDER, FOR SOLUTION INTRAVENOUS at 04:01

## 2020-02-14 RX ADMIN — DEXTROSE AND SODIUM CHLORIDE: 5; 900 INJECTION, SOLUTION INTRAVENOUS at 10:11

## 2020-02-14 RX ADMIN — SODIUM CHLORIDE: 9 INJECTION, SOLUTION INTRAVENOUS at 17:28

## 2020-02-14 RX ADMIN — PIPERACILLIN AND TAZOBACTAM 3.38 G: 3; .375 INJECTION, POWDER, FOR SOLUTION INTRAVENOUS at 19:17

## 2020-02-14 ASSESSMENT — PAIN DESCRIPTION - LOCATION
LOCATION: NECK
LOCATION: HEAD
LOCATION: ABDOMEN

## 2020-02-14 ASSESSMENT — PAIN DESCRIPTION - PAIN TYPE
TYPE: ACUTE PAIN

## 2020-02-14 ASSESSMENT — PAIN SCALES - GENERAL
PAINLEVEL_OUTOF10: 3
PAINLEVEL_OUTOF10: 6
PAINLEVEL_OUTOF10: 6
PAINLEVEL_OUTOF10: 0
PAINLEVEL_OUTOF10: 0

## 2020-02-14 ASSESSMENT — PAIN DESCRIPTION - ONSET
ONSET: GRADUAL
ONSET: OTHER (COMMENT)

## 2020-02-14 ASSESSMENT — PAIN DESCRIPTION - FREQUENCY
FREQUENCY: INTERMITTENT
FREQUENCY: CONTINUOUS
FREQUENCY: INTERMITTENT

## 2020-02-14 ASSESSMENT — PAIN DESCRIPTION - DESCRIPTORS
DESCRIPTORS: HEADACHE
DESCRIPTORS: ACHING
DESCRIPTORS: STABBING

## 2020-02-14 ASSESSMENT — PAIN DESCRIPTION - ORIENTATION: ORIENTATION: LOWER

## 2020-02-14 ASSESSMENT — PAIN - FUNCTIONAL ASSESSMENT: PAIN_FUNCTIONAL_ASSESSMENT: ACTIVITIES ARE NOT PREVENTED

## 2020-02-14 ASSESSMENT — PAIN DESCRIPTION - PROGRESSION: CLINICAL_PROGRESSION: GRADUALLY WORSENING

## 2020-02-14 NOTE — PROGRESS NOTES
[facility]  Inpatient/Observation/Outpatient Rehabilitation    Date: 2020  Patient Name: Irma Winston       [x] Inpatient Acute/Observation       []  Outpatient  : 1940       [] Pt no showed for scheduled appointment    [x] Pt refused/declined therapy at this time due to:     Pt states that he walked a lot before this PTA arrived--At 8:30 AM.  Told him that I would be back to check--arrived at 10:55  And he refused again.        [] Pt cancelled due to:  [] No Reason Given   [] Sick/ill   [x] Other:    [facility] PTA [id number]  Date: 2020

## 2020-02-14 NOTE — PLAN OF CARE
Problem: Pain:  Goal: Pain level will decrease  Description  Pain level will decrease  2/13/2020 2353 by Nancy Radford RN  Outcome: Ongoing  Note:   Patient is able to communicate when pain intervention is required. Patient is also able to rate the pain on a scale of 0-10. Pain is assessed with hourly rounding while patient is awake. Patient stated some abdominal pain nut stated he will tell when he needs pain intervention. Will continue to monitor. Problem: Falls - Risk of:  Goal: Will remain free from falls  Description  Will remain free from falls  2/13/2020 2353 by Nancy Radford RN  Outcome: Ongoing  Note:   Fall screening completed. Patient alert and oriented, uses call light appropriately. Needs assessed with hourly rounding. Environment scanned for safety issues. Problem: Activity:  Goal: Risk for activity intolerance will decrease  Description  Risk for activity intolerance will decrease  2/13/2020 2353 by Nancy Radford RN  Outcome: Ongoing  Note:   Patient has been up and ambulating in the room very well. He has not complained of any problem with the activities. Will continue to monitor. Problem: Bowel/Gastric:  Goal: Bowel function will improve  Description  Bowel function will improve  2/13/2020 2353 by Nancy Radford RN  Outcome: Ongoing  Note:   Patient's abdomen is soft and tender. Bowel sounds are active on all quadrants. Patient has have some episodes of diarrhea so far and stated he still have some pain. Will continue to monitor. Problem: Fluid Volume:  Goal: Maintenance of adequate hydration will improve  Description  Maintenance of adequate hydration will improve  2/13/2020 2353 by aNncy Radford RN  Outcome: Ongoing  Note:   Patient is on a clear liquid diet. Patient is not drinking much orally since he had that oral contrast but has been encouraged to take some water. IV fluid infusing @ 125 ml/hr. Intakes and output are closely monitored.      Problem: Health

## 2020-02-14 NOTE — PROGRESS NOTES
Progress Note  2/14/2020 5:04 PM  Subjective:   Admit Date: 2/11/2020  PCP: Rere García MD    Interval History: Feels  better. Slept fair. Denies fever,chills and rigors. Not much appetite. Feels bloated after a small meal.    Diet: DIET CLEAR LIQUID;  Medications:   Scheduled Meds:   piperacillin-tazobactam  3.375 g Intravenous Q8H    enoxaparin  40 mg Subcutaneous Daily    sodium chloride flush  10 mL Intravenous 2 times per day     Continuous Infusions:   dextrose 5 % and 0.9 % NaCl 125 mL/hr at 02/14/20 1011           Objective:   Vitals: BP (!) 144/93   Pulse 79   Temp 98 °F (36.7 °C) (Temporal)   Resp 18   Ht 6' (1.829 m)   Wt 212 lb 14.4 oz (96.6 kg) Comment: Bed rezeroed  SpO2 94%   BMI 28.87 kg/m²   General appearance: alert and cooperative with exam    Neck: no adenopathy, no carotid bruit, no JVD, supple, symmetrical, trachea midline and thyroid not enlarged, symmetric, no tenderness/mass/nodules  Lungs: clear to auscultation bilaterally  Heart: regular rate and rhythm, S1, S2 normal, no murmur, click, rub or gallop   Abdomen: Still distended. Soft. Has BS in all quadrants.   Extremities: extremities normal, atraumatic, no cyanosis or edema  Neurologic: Mental status: Alert, oriented, thought content appropriate          CBC:   Recent Labs     02/12/20  0540 02/13/20  0535 02/14/20  0453   WBC 13.4* 11.6* 10.1   HGB 15.4 12.5* 12.5*   * See Reflexed IPF Result See Reflexed IPF Result     BMP:    Recent Labs     02/12/20  0540 02/13/20  0535 02/14/20  0453    140 135   K 3.8 3.8 4.0    105 103   CO2 21 24 20   BUN 29* 29* 19   CREATININE 1.45* 1.31* 1.14   GLUCOSE 161* 132* 125*     Hepatic:   Recent Labs     02/11/20  2120   AST 20   ALT 23   BILITOT 0.45   ALKPHOS 63       Assessment:       Principal Problem:    SBO (small bowel obstruction) (HCC)  Active Problems:    Colitis    Lactic acidosis    Dehydration    Enteritis    Nausea & vomiting  Resolved Problems:    * No resolved hospital problems.  *      Plan:   Change the IV to without sugar    Basil Ying MD  Rounding

## 2020-02-14 NOTE — PROGRESS NOTES
The patient was complaining of some episodes of diarrhea, so the writer stated it over the phone to Dr. Lamberto Galvez during the patient updates. No new orders to follow at this time. Will continue to monitor.

## 2020-02-14 NOTE — PROGRESS NOTES
Patient assessed at this time. Morning vitals were obtained by student nurse and reviewed by this nurse. Pt is alert and oriented  x4. Pt respirations are easy and unlabored a t16 per min. Lungs are clear upon auscultation. Bowel sounds are active throughout all quadrants. Pt upper abdomen is taut and mildly distended. Lower abdomen is soft and non tender. Pt c/o having multiple episodes of diarrhea. Dr. Bose Gravely already aware per night shift nurse. Pt denies and pain or feeling nauseous this morning No episodes of emesis noted as well. Fresh ice chips given per pt request. Will continue to monitor.

## 2020-02-15 ENCOUNTER — APPOINTMENT (OUTPATIENT)
Dept: GENERAL RADIOLOGY | Age: 80
DRG: 388 | End: 2020-02-15
Payer: MEDICARE

## 2020-02-15 LAB
ANION GAP SERPL CALCULATED.3IONS-SCNC: 13 MMOL/L (ref 9–17)
BUN BLDV-MCNC: 16 MG/DL (ref 8–23)
BUN/CREAT BLD: 14 (ref 9–20)
CALCIUM SERPL-MCNC: 8.1 MG/DL (ref 8.6–10.4)
CHLORIDE BLD-SCNC: 103 MMOL/L (ref 98–107)
CO2: 20 MMOL/L (ref 20–31)
CREAT SERPL-MCNC: 1.13 MG/DL (ref 0.7–1.2)
GFR AFRICAN AMERICAN: >60 ML/MIN
GFR NON-AFRICAN AMERICAN: >60 ML/MIN
GFR SERPL CREATININE-BSD FRML MDRD: ABNORMAL ML/MIN/{1.73_M2}
GFR SERPL CREATININE-BSD FRML MDRD: ABNORMAL ML/MIN/{1.73_M2}
GLUCOSE BLD-MCNC: 104 MG/DL (ref 70–99)
HCT VFR BLD CALC: 36.4 % (ref 40.7–50.3)
HEMOGLOBIN: 12.5 G/DL (ref 13–17)
MCH RBC QN AUTO: 30.6 PG (ref 25.2–33.5)
MCHC RBC AUTO-ENTMCNC: 34.3 G/DL (ref 28.4–34.8)
MCV RBC AUTO: 89.2 FL (ref 82.6–102.9)
NRBC AUTOMATED: 0 PER 100 WBC
PDW BLD-RTO: 12.9 % (ref 11.8–14.4)
PLATELET # BLD: ABNORMAL K/UL (ref 138–453)
PLATELET, FLUORESCENCE: 112 K/UL (ref 138–453)
PLATELET, IMMATURE FRACTION: 4.1 % (ref 1.1–10.3)
PMV BLD AUTO: ABNORMAL FL (ref 8.1–13.5)
POTASSIUM SERPL-SCNC: 3.2 MMOL/L (ref 3.7–5.3)
RBC # BLD: 4.08 M/UL (ref 4.21–5.77)
SODIUM BLD-SCNC: 136 MMOL/L (ref 135–144)
WBC # BLD: 8.5 K/UL (ref 3.5–11.3)

## 2020-02-15 PROCEDURE — 6360000002 HC RX W HCPCS: Performed by: SURGERY

## 2020-02-15 PROCEDURE — 85027 COMPLETE CBC AUTOMATED: CPT

## 2020-02-15 PROCEDURE — 6370000000 HC RX 637 (ALT 250 FOR IP): Performed by: INTERNAL MEDICINE

## 2020-02-15 PROCEDURE — 85055 RETICULATED PLATELET ASSAY: CPT

## 2020-02-15 PROCEDURE — 80048 BASIC METABOLIC PNL TOTAL CA: CPT

## 2020-02-15 PROCEDURE — 36415 COLL VENOUS BLD VENIPUNCTURE: CPT

## 2020-02-15 PROCEDURE — 1200000000 HC SEMI PRIVATE

## 2020-02-15 PROCEDURE — 2580000003 HC RX 258: Performed by: SURGERY

## 2020-02-15 PROCEDURE — 2580000003 HC RX 258: Performed by: INTERNAL MEDICINE

## 2020-02-15 RX ORDER — POTASSIUM CHLORIDE 750 MG/1
10 TABLET, EXTENDED RELEASE ORAL 2 TIMES DAILY
Status: DISCONTINUED | OUTPATIENT
Start: 2020-02-15 | End: 2020-02-17

## 2020-02-15 RX ADMIN — SODIUM CHLORIDE: 9 INJECTION, SOLUTION INTRAVENOUS at 03:14

## 2020-02-15 RX ADMIN — SODIUM CHLORIDE: 9 INJECTION, SOLUTION INTRAVENOUS at 22:19

## 2020-02-15 RX ADMIN — PIPERACILLIN AND TAZOBACTAM 3.38 G: 3; .375 INJECTION, POWDER, FOR SOLUTION INTRAVENOUS at 20:16

## 2020-02-15 RX ADMIN — ACETAMINOPHEN 650 MG: 325 TABLET, FILM COATED ORAL at 20:28

## 2020-02-15 RX ADMIN — ACETAMINOPHEN 650 MG: 325 TABLET, FILM COATED ORAL at 15:27

## 2020-02-15 RX ADMIN — SODIUM CHLORIDE: 9 INJECTION, SOLUTION INTRAVENOUS at 12:09

## 2020-02-15 RX ADMIN — ACETAMINOPHEN 650 MG: 325 TABLET, FILM COATED ORAL at 12:04

## 2020-02-15 RX ADMIN — POTASSIUM CHLORIDE 10 MEQ: 10 TABLET, EXTENDED RELEASE ORAL at 20:26

## 2020-02-15 RX ADMIN — PIPERACILLIN AND TAZOBACTAM 3.38 G: 3; .375 INJECTION, POWDER, FOR SOLUTION INTRAVENOUS at 12:04

## 2020-02-15 RX ADMIN — POTASSIUM CHLORIDE 10 MEQ: 10 TABLET, EXTENDED RELEASE ORAL at 09:59

## 2020-02-15 RX ADMIN — PIPERACILLIN AND TAZOBACTAM 3.38 G: 3; .375 INJECTION, POWDER, FOR SOLUTION INTRAVENOUS at 03:14

## 2020-02-15 RX ADMIN — ACETAMINOPHEN 650 MG: 325 TABLET, FILM COATED ORAL at 03:13

## 2020-02-15 ASSESSMENT — PAIN DESCRIPTION - PAIN TYPE
TYPE: ACUTE PAIN

## 2020-02-15 ASSESSMENT — PAIN SCALES - GENERAL
PAINLEVEL_OUTOF10: 6
PAINLEVEL_OUTOF10: 7
PAINLEVEL_OUTOF10: 6
PAINLEVEL_OUTOF10: 6

## 2020-02-15 ASSESSMENT — PAIN DESCRIPTION - FREQUENCY: FREQUENCY: INTERMITTENT

## 2020-02-15 ASSESSMENT — PAIN DESCRIPTION - LOCATION
LOCATION: HEAD
LOCATION: NECK
LOCATION: NECK

## 2020-02-15 ASSESSMENT — PAIN DESCRIPTION - DESCRIPTORS
DESCRIPTORS: ACHING
DESCRIPTORS: ACHING

## 2020-02-15 NOTE — PLAN OF CARE
Problem: Pain:  Goal: Pain level will decrease  Outcome: Ongoing  Goal: Control of acute pain  Outcome: Ongoing  Goal: Control of chronic pain  Outcome: Ongoing     Problem: Falls - Risk of:  Goal: Will remain free from falls  Outcome: Ongoing  Goal: Absence of physical injury  Outcome: Ongoing     Problem: Activity:  Goal: Risk for activity intolerance will decrease  Outcome: Ongoing     Problem:  Bowel/Gastric:  Goal: Bowel function will improve  Outcome: Ongoing  Goal: Diagnostic test results will improve  Outcome: Ongoing  Goal: Occurrences of nausea will decrease  Outcome: Ongoing  Goal: Occurrences of vomiting will decrease  Outcome: Ongoing     Problem: Fluid Volume:  Goal: Maintenance of adequate hydration will improve  Outcome: Ongoing     Problem: Health Behavior:  Goal: Ability to state signs and symptoms to report to health care provider will improve  Outcome: Ongoing     Problem: Physical Regulation:  Goal: Complications related to the disease process, condition or treatment will be avoided or minimized  Outcome: Ongoing  Goal: Ability to maintain clinical measurements within normal limits will improve  Outcome: Ongoing     Problem: Sensory:  Goal: Pain level will decrease  Outcome: Ongoing  Goal: Ability to identify factors that increase the pain will improve  Outcome: Ongoing  Goal: Ability to notify healthcare provider of pain before it becomes unmanageable or unbearable will improve  Outcome: Ongoing     Problem: Fluid Volume - Imbalance:  Goal: Absence of imbalanced fluid volume signs and symptoms  Outcome: Ongoing     Problem: Musculor/Skeletal Functional Status  Goal: Highest potential functional level  Outcome: Ongoing  Goal: Absence of falls  Outcome: Ongoing

## 2020-02-15 NOTE — PROGRESS NOTES
GENERAL SURGERY PROGRESS NOTE- inpatient      PATIENT NAME: Ortega Penn     DATE: 2/14/2020    SUBJECTIVE:    Patient has significant improvement in labs. Repeat CT with po contrast enteritis, clinical ileus slowly resolving, no obstruction. No previous surgeries. Tolerating clears. Had some loose stools, discussed with Dr. Jeanna Delgado this evening. He mentions that patient had recently attended party of sister in law, she was sick with gastroenteritis type symptoms. He may have had infectious contact, this was not mentioned to me when I asked patient and wife yesterday about sick contacts etc. Patient says he is feeling better. Continuing IV fluids, IV antibiotics. Unknown last colonoscopy. Healthy active 79 yo. OBJECTIVE:   VITALS:  BP (!) 152/82   Pulse 70   Temp 98.5 °F (36.9 °C) (Temporal)   Resp 18   Ht 6' (1.829 m)   Wt 212 lb 14.4 oz (96.6 kg) Comment: Bed rezeroed  SpO2 92%   BMI 28.87 kg/m²     INTAKE/OUTPUT:    I/O last 3 completed shifts:   In: 3554.3 [P.O.:410; I.V.:3144.3]  Out: -   I/O this shift:  In: 1579.4 [I.V.:1579.4]  Out: -               CONSTITUTIONAL:  awake and alert  LUNGS:  clear to auscultation  HEART:regular rate  ABDOMEN:   normal bowel sounds, soft and less distended, non-peritoneal, no guarding or rebound, much less tenderness today  EXTREMITIES: no c/c/e      Data:  CBC:   Recent Labs     02/12/20  0540 02/13/20  0535 02/14/20  0453   WBC 13.4* 11.6* 10.1   HGB 15.4 12.5* 12.5*   HCT 45.8 37.7* 36.0*   * See Reflexed IPF Result See Reflexed IPF Result     BMP:    Recent Labs     02/12/20  0540 02/13/20  0535 02/14/20  0453    140 135   K 3.8 3.8 4.0    105 103   CO2 21 24 20   BUN 29* 29* 19   CREATININE 1.45* 1.31* 1.14   GLUCOSE 161* 132* 125*     Hepatic:   Recent Labs     02/11/20 2120   AST 20   ALT 23   BILITOT 0.45   ALKPHOS 63     Rad:    EXAMINATION:   CT OF THE ABDOMEN AND PELVIS WITHOUT CONTRAST 2/13/2020 9:17 am       TECHNIQUE:   CT of the Peritoneum/Retroperitoneum: Abdominal aorta is atherosclerotic.  No   retroperitoneal adenopathy.  Retroperitoneal lipomatosis.       Bones/Soft Tissues: Moderate bilateral hip degenerative changes.  No   aggressive lytic or blastic bony lesion.  Foci of gas along the left lower   quadrant anterior abdominal wall are likely related to subcutaneous   injections.           Impression   1. Persistent circumferential thickening of multiple loops of distal small   bowel with mild distention of the small bowel, likely a secondary ileus in   the setting of enteritis. 2. Sigmoid diverticulosis with no evidence of diverticulitis. 3. Small hiatal hernia. 4. Small amount of free fluid along the mesentery and trace fluid around the   liver.           ASSESSMENT & PLAN:  Enteritis of unclear etiology, presumably infectious with recent exposure to sick relative and improved response to IV antibiotics. Acute dehydration/lactic acidosis resolved with IV fluids. Ileus due to inflamed bowel without evidence of mechanical obstruction, and no previous abdominal surgeries further supporting this. I spoke with Dr. Ellen Alba. I recommend outpatient follow up for repeat CT scan in 2-3 weeks for progress study. Slowly advance diet as tolerated. Recommend outpatient po cipro and flagyl upon discharge. Dr. Ellen Alba would like patient to have outpatient colonoscopy. Discussed with patient and family in room. Surgery sign off, will be happy to see in the [address] as outpatient, I updated the discharge follow up information to reflect this. Please call as needed. Also spoke with RN.      Dr. Michele Mendez

## 2020-02-15 NOTE — PLAN OF CARE
improve  Description  Ability to maintain clinical measurements within normal limits will improve  Outcome: Ongoing     Problem: Sensory:  Goal: Pain level will decrease  Description  Pain level will decrease  2/14/2020 2014 by Ky Nicholas RN  Outcome: Ongoing  Note:   Pain assessed routinely and as needed with a 0-10 scale. PRN pain medication given as appropriate per orders. Pain reassessed within an hour, will continue to monitor    2/14/2020 1021 by Khanh Mcfarland RN  Outcome: Ongoing  Note:   Monitoring pain and discomfort. Non-pharmaceutical pain control measures encouraged to reduce pain/discomfort. Physician will be notified if medications ordered are not effective in reducing/alleviating pain. Will continue to monitor and medicate as ordered.  Khanh Mcfarland RN   Goal: Ability to identify factors that increase the pain will improve  Description  Ability to identify factors that increase the pain will improve  Outcome: Ongoing  Goal: Ability to notify healthcare provider of pain before it becomes unmanageable or unbearable will improve  Description  Ability to notify healthcare provider of pain before it becomes unmanageable or unbearable will improve  Outcome: Ongoing     Problem: Fluid Volume - Imbalance:  Goal: Absence of imbalanced fluid volume signs and symptoms  Description  Absence of imbalanced fluid volume signs and symptoms  2/14/2020 2014 by Ky Nicholas RN  Outcome: Ongoing  2/14/2020 1021 by Khanh Mcfarland RN  Outcome: Ongoing  Note:   Accurate I/O     Problem: Musculor/Skeletal Functional Status  Goal: Highest potential functional level  Outcome: Ongoing  Goal: Absence of falls  Outcome: Ongoing

## 2020-02-15 NOTE — PROGRESS NOTES
Platelets 689 today. Monitor for continued decrease- may need to recommend Lovenox discontinued.   Electronically signed by Sherie Zheng, [address] [facility] on 2/15/2020 at 9:25 AM

## 2020-02-16 ENCOUNTER — APPOINTMENT (OUTPATIENT)
Dept: GENERAL RADIOLOGY | Age: 80
DRG: 388 | End: 2020-02-16
Payer: MEDICARE

## 2020-02-16 LAB
ANION GAP SERPL CALCULATED.3IONS-SCNC: 13 MMOL/L (ref 9–17)
BNP INTERPRETATION: ABNORMAL
BUN BLDV-MCNC: 13 MG/DL (ref 8–23)
BUN/CREAT BLD: 13 (ref 9–20)
CALCIUM SERPL-MCNC: 7.7 MG/DL (ref 8.6–10.4)
CHLORIDE BLD-SCNC: 109 MMOL/L (ref 98–107)
CO2: 18 MMOL/L (ref 20–31)
CREAT SERPL-MCNC: 1.03 MG/DL (ref 0.7–1.2)
EKG ATRIAL RATE: 57 BPM
EKG P AXIS: 10 DEGREES
EKG P-R INTERVAL: 170 MS
EKG Q-T INTERVAL: 450 MS
EKG QRS DURATION: 100 MS
EKG QTC CALCULATION (BAZETT): 438 MS
EKG R AXIS: 11 DEGREES
EKG T AXIS: 10 DEGREES
EKG VENTRICULAR RATE: 57 BPM
GFR AFRICAN AMERICAN: >60 ML/MIN
GFR NON-AFRICAN AMERICAN: >60 ML/MIN
GFR SERPL CREATININE-BSD FRML MDRD: ABNORMAL ML/MIN/{1.73_M2}
GFR SERPL CREATININE-BSD FRML MDRD: ABNORMAL ML/MIN/{1.73_M2}
GLUCOSE BLD-MCNC: 98 MG/DL (ref 70–99)
HCT VFR BLD CALC: 33.3 % (ref 40.7–50.3)
HEMOGLOBIN: 11.6 G/DL (ref 13–17)
MCH RBC QN AUTO: 30.2 PG (ref 25.2–33.5)
MCHC RBC AUTO-ENTMCNC: 34.8 G/DL (ref 28.4–34.8)
MCV RBC AUTO: 86.7 FL (ref 82.6–102.9)
NRBC AUTOMATED: 0 PER 100 WBC
PDW BLD-RTO: 12.9 % (ref 11.8–14.4)
PLATELET # BLD: ABNORMAL K/UL (ref 138–453)
PLATELET, FLUORESCENCE: 106 K/UL (ref 138–453)
PLATELET, IMMATURE FRACTION: 3.2 % (ref 1.1–10.3)
PMV BLD AUTO: ABNORMAL FL (ref 8.1–13.5)
POTASSIUM SERPL-SCNC: 3.4 MMOL/L (ref 3.7–5.3)
PRO-BNP: 1831 PG/ML
RBC # BLD: 3.84 M/UL (ref 4.21–5.77)
SODIUM BLD-SCNC: 140 MMOL/L (ref 135–144)
WBC # BLD: 6.4 K/UL (ref 3.5–11.3)

## 2020-02-16 PROCEDURE — 72040 X-RAY EXAM NECK SPINE 2-3 VW: CPT

## 2020-02-16 PROCEDURE — 93005 ELECTROCARDIOGRAM TRACING: CPT | Performed by: INTERNAL MEDICINE

## 2020-02-16 PROCEDURE — 6360000002 HC RX W HCPCS: Performed by: INTERNAL MEDICINE

## 2020-02-16 PROCEDURE — 2580000003 HC RX 258: Performed by: INTERNAL MEDICINE

## 2020-02-16 PROCEDURE — 51798 US URINE CAPACITY MEASURE: CPT

## 2020-02-16 PROCEDURE — 85027 COMPLETE CBC AUTOMATED: CPT

## 2020-02-16 PROCEDURE — 36415 COLL VENOUS BLD VENIPUNCTURE: CPT

## 2020-02-16 PROCEDURE — 6370000000 HC RX 637 (ALT 250 FOR IP): Performed by: INTERNAL MEDICINE

## 2020-02-16 PROCEDURE — 93010 ELECTROCARDIOGRAM REPORT: CPT | Performed by: INTERNAL MEDICINE

## 2020-02-16 PROCEDURE — 2580000003 HC RX 258: Performed by: SURGERY

## 2020-02-16 PROCEDURE — 80048 BASIC METABOLIC PNL TOTAL CA: CPT

## 2020-02-16 PROCEDURE — 1200000000 HC SEMI PRIVATE

## 2020-02-16 PROCEDURE — 83880 ASSAY OF NATRIURETIC PEPTIDE: CPT

## 2020-02-16 PROCEDURE — 71046 X-RAY EXAM CHEST 2 VIEWS: CPT

## 2020-02-16 PROCEDURE — 85055 RETICULATED PLATELET ASSAY: CPT

## 2020-02-16 PROCEDURE — 74022 RADEX COMPL AQT ABD SERIES: CPT

## 2020-02-16 PROCEDURE — 6360000002 HC RX W HCPCS: Performed by: SURGERY

## 2020-02-16 RX ORDER — FUROSEMIDE 10 MG/ML
20 INJECTION INTRAMUSCULAR; INTRAVENOUS ONCE
Status: COMPLETED | OUTPATIENT
Start: 2020-02-16 | End: 2020-02-16

## 2020-02-16 RX ADMIN — ACETAMINOPHEN 650 MG: 325 TABLET, FILM COATED ORAL at 20:36

## 2020-02-16 RX ADMIN — Medication 10 ML: at 20:33

## 2020-02-16 RX ADMIN — PIPERACILLIN AND TAZOBACTAM 3.38 G: 3; .375 INJECTION, POWDER, FOR SOLUTION INTRAVENOUS at 04:05

## 2020-02-16 RX ADMIN — FUROSEMIDE 20 MG: 10 INJECTION, SOLUTION INTRAMUSCULAR; INTRAVENOUS at 20:25

## 2020-02-16 RX ADMIN — POTASSIUM CHLORIDE 10 MEQ: 10 TABLET, EXTENDED RELEASE ORAL at 08:24

## 2020-02-16 RX ADMIN — PIPERACILLIN AND TAZOBACTAM 3.38 G: 3; .375 INJECTION, POWDER, FOR SOLUTION INTRAVENOUS at 11:41

## 2020-02-16 RX ADMIN — PIPERACILLIN AND TAZOBACTAM 3.38 G: 3; .375 INJECTION, POWDER, FOR SOLUTION INTRAVENOUS at 20:25

## 2020-02-16 RX ADMIN — ACETAMINOPHEN 650 MG: 325 TABLET, FILM COATED ORAL at 11:41

## 2020-02-16 RX ADMIN — ACETAMINOPHEN 650 MG: 325 TABLET, FILM COATED ORAL at 01:30

## 2020-02-16 RX ADMIN — SODIUM CHLORIDE: 9 INJECTION, SOLUTION INTRAVENOUS at 09:59

## 2020-02-16 RX ADMIN — POTASSIUM CHLORIDE 10 MEQ: 10 TABLET, EXTENDED RELEASE ORAL at 20:25

## 2020-02-16 ASSESSMENT — PAIN DESCRIPTION - FREQUENCY: FREQUENCY: INTERMITTENT

## 2020-02-16 ASSESSMENT — PAIN DESCRIPTION - LOCATION
LOCATION: NECK
LOCATION: NECK

## 2020-02-16 ASSESSMENT — PAIN SCALES - GENERAL
PAINLEVEL_OUTOF10: 6
PAINLEVEL_OUTOF10: 4
PAINLEVEL_OUTOF10: 2

## 2020-02-16 ASSESSMENT — PAIN DESCRIPTION - PAIN TYPE
TYPE: ACUTE PAIN
TYPE: ACUTE PAIN

## 2020-02-16 ASSESSMENT — PAIN DESCRIPTION - ORIENTATION: ORIENTATION: LOWER

## 2020-02-16 ASSESSMENT — PAIN DESCRIPTION - DESCRIPTORS: DESCRIPTORS: HEADACHE

## 2020-02-16 NOTE — PROGRESS NOTES
Progress Note  2/16/2020 3:32 PM  Subjective:   Admit Date: 2/11/2020  PCP: Rios Melgar MD    Interval History: Feels about the same as yesterday. Has neck pain and headache. Wife notes that he is short of breath. Diet: DIET CLEAR LIQUID;  Medications:   Scheduled Meds:   potassium chloride  10 mEq Oral BID    piperacillin-tazobactam  3.375 g Intravenous Q8H    enoxaparin  40 mg Subcutaneous Daily    sodium chloride flush  10 mL Intravenous 2 times per day     Continuous Infusions:   sodium chloride 100 mL/hr at 02/16/20 0959           Objective:   Vitals: BP (!) 142/57   Pulse 57   Temp 97.9 °F (36.6 °C) (Temporal)   Resp 20   Ht 6' (1.829 m)   Wt 218 lb 6.4 oz (99.1 kg)   SpO2 94%   BMI 29.62 kg/m²   General appearance: alert and cooperative with exam  Has dry cough and short of breath on talking. Neck: no adenopathy, no carotid bruit, supple, symmetrical, trachea midline and thyroid not enlarged, symmetric, no tenderness/mass/nodules  Lungs: clear to auscultation bilaterally  Heart: regular rate and rhythm, S1, S2 normal, no murmur, click, rub or gallop  Extremities: edema 1+ both legs  Neurologic: Mental status: Alert, oriented, thought content appropriate          CBC:   Recent Labs     02/14/20  0453 02/15/20  0625 02/16/20  0600   WBC 10.1 8.5 6.4   HGB 12.5* 12.5* 11.6*   PLT See Reflexed IPF Result See Reflexed IPF Result See Reflexed IPF Result     BMP:    Recent Labs     02/14/20  0453 02/15/20  0625 02/16/20  0600    136 140   K 4.0 3.2* 3.4*    103 109*   CO2 20 20 18*   BUN 19 16 13   CREATININE 1.14 1.13 1.03   GLUCOSE 125* 104* 98         Assessment:       Principal Problem:    SBO (small bowel obstruction) (HCC)  Active Problems:    Colitis    Lactic acidosis    Dehydration    Enteritis    Nausea & vomiting  Resolved Problems:    * No resolved hospital problems. *      Plan:   Lock IV  BNP  Xray chest.  Bladder scan.     Rios Melgar MD  Rounding

## 2020-02-16 NOTE — PLAN OF CARE
Problem: Pain:  Goal: Pain level will decrease  Description  Pain level will decrease  2/15/2020 2205 by Manuelito Esteves RN  Outcome: Ongoing  Note:   Pain assessed every 4 hours. Patient is able to communicate with staff the need for pain interventions. Patient currently resting quietly, see MAR for prn pain medication administration. Will continue to monitor. 2/15/2020 1032 by Rosario Gray RN  Outcome: Ongoing  Goal: Control of acute pain  Description  Control of acute pain  2/15/2020 2205 by Manuelito Esteves RN  Outcome: Ongoing  2/15/2020 1032 by Rosario Gray RN  Outcome: Ongoing  Goal: Control of chronic pain  Description  Control of chronic pain  2/15/2020 2205 by Manuelito Esteves RN  Outcome: Ongoing  2/15/2020 1032 by Rosario Gray RN  Outcome: Ongoing     Problem: Falls - Risk of:  Goal: Will remain free from falls  Description  Will remain free from falls  2/15/2020 2205 by Manuelito Esteves RN  Outcome: Ongoing  Note:   Fall assessment completed daily. Bed in lowest position. Call light within reach. Falling star posted to outside of door. Fall risk sticker in place on ID band. Side rails up 2/4. Bed alarm on for safety. Patient ambulates independently. Will continue to monitor. 2/15/2020 1032 by Rosario Gray RN  Outcome: Ongoing  Goal: Absence of physical injury  Description  Absence of physical injury  2/15/2020 2205 by Manuelito Esteves RN  Outcome: Ongoing  2/15/2020 1032 by Rosario Gray RN  Outcome: Ongoing     Problem: Activity:  Goal: Risk for activity intolerance will decrease  Description  Risk for activity intolerance will decrease  2/15/2020 2205 by Manuelito Esteves RN  Outcome: Ongoing  2/15/2020 1032 by Rosario Gray RN  Outcome: Ongoing     Problem: Bowel/Gastric:  Goal: Bowel function will improve  Description  Bowel function will improve  2/15/2020 2205 by Manuelito Esteves RN  Outcome: Ongoing  Note:   Patient has active bowel sounds in all 4 quad.   States having an episode of Problem: Sensory:  Goal: Pain level will decrease  Description  Pain level will decrease  2/15/2020 2205 by Becky Solis RN  Outcome: Ongoing  Note:   Pain assessed every 4 hours. Patient is able to communicate with staff the need for pain interventions. Patient currently resting quietly, see MAR for prn pain medication administration. Will continue to monitor.     2/15/2020 1032 by Kathleen Newell RN  Outcome: Ongoing  Goal: Ability to identify factors that increase the pain will improve  Description  Ability to identify factors that increase the pain will improve  2/15/2020 2205 by Becky Solis RN  Outcome: Ongoing  2/15/2020 1032 by Kathleen Newell RN  Outcome: Ongoing  Goal: Ability to notify healthcare provider of pain before it becomes unmanageable or unbearable will improve  Description  Ability to notify healthcare provider of pain before it becomes unmanageable or unbearable will improve  2/15/2020 2205 by Becky Solis RN  Outcome: Ongoing  2/15/2020 1032 by Kathleen Newell RN  Outcome: Ongoing     Problem: Fluid Volume - Imbalance:  Goal: Absence of imbalanced fluid volume signs and symptoms  Description  Absence of imbalanced fluid volume signs and symptoms  2/15/2020 2205 by Becky Solis RN  Outcome: Ongoing  2/15/2020 1032 by Kathleen Newell RN  Outcome: Ongoing     Problem: Musculor/Skeletal Functional Status  Goal: Highest potential functional level  2/15/2020 2205 by Becky Solis RN  Outcome: Ongoing  2/15/2020 1032 by Kathleen Newell RN  Outcome: Ongoing  Goal: Absence of falls  2/15/2020 2205 by Becky Solis RN  Outcome: Ongoing  2/15/2020 1032 by Kathleen Newell RN  Outcome: Ongoing

## 2020-02-16 NOTE — PLAN OF CARE
Problem: Pain:  Goal: Pain level will decrease  Description  Pain level will decrease  Outcome: Ongoing  Note:   Pain assessed every four hours and as needed using 0-10 pain scale. Pt educated on scale and uses scale appropriately. Encourage pt to notify staff of pain before pain becomes uncontrollable. Correlate periods of heavy activity after pain medication administration. Use pharmacological and non pharmacological methods for pain relief such as: warm blankets, ice, television, reading, or rest.       Problem: Falls - Risk of:  Goal: Will remain free from falls  Description  Will remain free from falls  Outcome: Ongoing  Note:   Call light in reach at all times, frequent checks, bed in lowest position, wheels of bed and chair locked, non skid footwear on, appropriate transfer techniques, personal items within reach, walkways free of obstructions, fall risk armband and sign displayed, Root fall risk score per protocol. No falls this shift, will continue to monitor. Problem: Activity:  Goal: Risk for activity intolerance will decrease  Description  Risk for activity intolerance will decrease  Outcome: Ongoing  Note:   Pt encouraged to ambulate in halls. Problem: Bowel/Gastric:  Goal: Bowel function will improve  Description  Bowel function will improve  Outcome: Ongoing  Note:   Pt with 2 episodes of loose stools today. Problem: Fluid Volume:  Goal: Maintenance of adequate hydration will improve  Description  Maintenance of adequate hydration will improve  Outcome: Ongoing  Note:   IV fluids stopped. Oral fluids encouraged. Problem: Sensory:  Goal: Pain level will decrease  Description  Pain level will decrease  Outcome: Ongoing  Note:   Pain assessed every four hours and as needed using 0-10 pain scale. Pt educated on scale and uses scale appropriately. Encourage pt to notify staff of pain before pain becomes uncontrollable.  Correlate periods of heavy activity after pain medication

## 2020-02-17 ENCOUNTER — APPOINTMENT (OUTPATIENT)
Dept: NON INVASIVE DIAGNOSTICS | Age: 80
DRG: 388 | End: 2020-02-17
Payer: MEDICARE

## 2020-02-17 LAB
ANION GAP SERPL CALCULATED.3IONS-SCNC: 12 MMOL/L (ref 9–17)
BUN BLDV-MCNC: 11 MG/DL (ref 8–23)
BUN/CREAT BLD: 10 (ref 9–20)
CALCIUM SERPL-MCNC: 8.1 MG/DL (ref 8.6–10.4)
CHLORIDE BLD-SCNC: 103 MMOL/L (ref 98–107)
CO2: 19 MMOL/L (ref 20–31)
CREAT SERPL-MCNC: 1.11 MG/DL (ref 0.7–1.2)
GFR AFRICAN AMERICAN: >60 ML/MIN
GFR NON-AFRICAN AMERICAN: >60 ML/MIN
GFR SERPL CREATININE-BSD FRML MDRD: ABNORMAL ML/MIN/{1.73_M2}
GFR SERPL CREATININE-BSD FRML MDRD: ABNORMAL ML/MIN/{1.73_M2}
GLUCOSE BLD-MCNC: 101 MG/DL (ref 70–99)
HCT VFR BLD CALC: 33.1 % (ref 40.7–50.3)
HEMOGLOBIN: 11.7 G/DL (ref 13–17)
LV EF: 60 %
LVEF MODALITY: NORMAL
MCH RBC QN AUTO: 30.3 PG (ref 25.2–33.5)
MCHC RBC AUTO-ENTMCNC: 35.3 G/DL (ref 28.4–34.8)
MCV RBC AUTO: 85.8 FL (ref 82.6–102.9)
NRBC AUTOMATED: 0 PER 100 WBC
PDW BLD-RTO: 12.6 % (ref 11.8–14.4)
PLATELET # BLD: 115 K/UL (ref 138–453)
PMV BLD AUTO: 10.1 FL (ref 8.1–13.5)
POTASSIUM SERPL-SCNC: 3.3 MMOL/L (ref 3.7–5.3)
RBC # BLD: 3.86 M/UL (ref 4.21–5.77)
SODIUM BLD-SCNC: 134 MMOL/L (ref 135–144)
WBC # BLD: 6.4 K/UL (ref 3.5–11.3)

## 2020-02-17 PROCEDURE — 97116 GAIT TRAINING THERAPY: CPT

## 2020-02-17 PROCEDURE — 97110 THERAPEUTIC EXERCISES: CPT

## 2020-02-17 PROCEDURE — 85027 COMPLETE CBC AUTOMATED: CPT

## 2020-02-17 PROCEDURE — 6370000000 HC RX 637 (ALT 250 FOR IP): Performed by: INTERNAL MEDICINE

## 2020-02-17 PROCEDURE — 93306 TTE W/DOPPLER COMPLETE: CPT

## 2020-02-17 PROCEDURE — 6360000002 HC RX W HCPCS: Performed by: SURGERY

## 2020-02-17 PROCEDURE — 36415 COLL VENOUS BLD VENIPUNCTURE: CPT

## 2020-02-17 PROCEDURE — 1200000000 HC SEMI PRIVATE

## 2020-02-17 PROCEDURE — 6360000002 HC RX W HCPCS: Performed by: INTERNAL MEDICINE

## 2020-02-17 PROCEDURE — 80048 BASIC METABOLIC PNL TOTAL CA: CPT

## 2020-02-17 PROCEDURE — 2580000003 HC RX 258: Performed by: SURGERY

## 2020-02-17 PROCEDURE — 2580000003 HC RX 258: Performed by: INTERNAL MEDICINE

## 2020-02-17 RX ORDER — POTASSIUM CHLORIDE 20 MEQ/1
20 TABLET, EXTENDED RELEASE ORAL 2 TIMES DAILY
Status: DISCONTINUED | OUTPATIENT
Start: 2020-02-17 | End: 2020-02-20 | Stop reason: HOSPADM

## 2020-02-17 RX ORDER — FUROSEMIDE 10 MG/ML
20 INJECTION INTRAMUSCULAR; INTRAVENOUS ONCE
Status: COMPLETED | OUTPATIENT
Start: 2020-02-17 | End: 2020-02-17

## 2020-02-17 RX ADMIN — PIPERACILLIN AND TAZOBACTAM 3.38 G: 3; .375 INJECTION, POWDER, FOR SOLUTION INTRAVENOUS at 20:19

## 2020-02-17 RX ADMIN — PIPERACILLIN AND TAZOBACTAM 3.38 G: 3; .375 INJECTION, POWDER, FOR SOLUTION INTRAVENOUS at 11:56

## 2020-02-17 RX ADMIN — POTASSIUM CHLORIDE 20 MEQ: 20 TABLET, EXTENDED RELEASE ORAL at 20:19

## 2020-02-17 RX ADMIN — ACETAMINOPHEN 650 MG: 325 TABLET, FILM COATED ORAL at 02:05

## 2020-02-17 RX ADMIN — POTASSIUM CHLORIDE 20 MEQ: 20 TABLET, EXTENDED RELEASE ORAL at 11:55

## 2020-02-17 RX ADMIN — Medication 10 ML: at 20:19

## 2020-02-17 RX ADMIN — FUROSEMIDE 20 MG: 10 INJECTION, SOLUTION INTRAMUSCULAR; INTRAVENOUS at 11:55

## 2020-02-17 RX ADMIN — PIPERACILLIN AND TAZOBACTAM 3.38 G: 3; .375 INJECTION, POWDER, FOR SOLUTION INTRAVENOUS at 03:50

## 2020-02-17 RX ADMIN — Medication 10 ML: at 09:52

## 2020-02-17 ASSESSMENT — PAIN SCALES - GENERAL
PAINLEVEL_OUTOF10: 5
PAINLEVEL_OUTOF10: 0
PAINLEVEL_OUTOF10: 0

## 2020-02-17 NOTE — PROGRESS NOTES
Physical Therapy  Facility/Department: [facility] AT THE [facility] MED SURG  Daily Treatment Note  NAME: Gomez Lamb  : 1940  MRN: [id number]    Date of Service: 2020    Discharge Recommendations:  Continue to assess pending progress        Assessment      PT Education: Goals;PT Role;Plan of Care;Gait Training  Patient Education: Educated pt on above with good understanding noted. REQUIRES PT FOLLOW UP: Yes  Activity Tolerance  Activity Tolerance: Patient Tolerated treatment well     Patient Diagnosis(es): The primary encounter diagnosis was Generalized abdominal pain. A diagnosis of Infectious enteritis, unspecified infectious agent was also pertinent to this visit. has a past medical history of Hyperlipidemia, Hypertension, and Skin cancer. has a past surgical history that includes Colonoscopy; Cataract removal with implant (Right, 2018); pr xcapsl ctrc rmvl insj io lens prosth w/o ecp (Right, 3/5/2018); Cataract removal with implant (Left, 2018); and pr xcapsl ctrc rmvl insj io lens prosth w/o ecp (Left, 2018). Restrictions  Restrictions/Precautions  Restrictions/Precautions: General Precautions  Subjective   General  Chart Reviewed: Yes  Response To Previous Treatment: Patient with no complaints from previous session. Family / Caregiver Present: No  Referring Practitioner: Dr. Andreea Miner   Subjective  Subjective: Pt with no complaints of pain currently. Stated that his neck is feeling \"stiff. \"           Orientation  Orientation  Overall Orientation Status: Within Normal Limits  Cognition      Objective      Transfers  Sit to Stand: Independent  Stand to sit: Independent  Ambulation  Ambulation?: Yes  WB Status: unrstricted   Ambulation 1  Surface: level tile  Device: No Device  Assistance: Supervision  Distance: 400 feet x 1  Comments: Verbal cues minmally for posture with good understanding noted.       Balance  Posture: Fair  Sitting - Static: Good  Sitting - Dynamic: Good  Standing -

## 2020-02-17 NOTE — PROGRESS NOTES
of Care    Nutrition Evaluation:   · Evaluation: Progressing toward goals   · Goals: PO >75% meals and supplements on advanced diet    · Monitoring: Nutrition Progression, Nausea or Vomiting, Diet Tolerance    Electronically signed by Yue Pollock RD, BENEDICTO on 2/17/20 at 2:43 PM    Contact Number: [id number]

## 2020-02-17 NOTE — PLAN OF CARE
Problem: Pain:  Goal: Pain level will decrease  Description  Pain level will decrease  2/17/2020 0134 by Rashaun Meraz RN  Outcome: Ongoing  Note:   Pt's pain is assessed using 0-10 scale. Pt has PRN pain medications available. Patient has been educated on use and possible side effects of pain medications. Patient understands to ask for pain medications before pain becomes too unbearable but has also been educated and nonpharmacological options such as deep breathing, distraction, and repositioning. 2/16/2020 1742 by Jose Alfredo Kelly RN  Outcome: Ongoing  Note:   Pain assessed every four hours and as needed using 0-10 pain scale. Pt educated on scale and uses scale appropriately. Encourage pt to notify staff of pain before pain becomes uncontrollable. Correlate periods of heavy activity after pain medication administration. Use pharmacological and non pharmacological methods for pain relief such as: warm blankets, ice, television, reading, or rest.    Goal: Control of acute pain  Description  Control of acute pain  Outcome: Ongoing  Goal: Control of chronic pain  Description  Control of chronic pain  Outcome: Ongoing     Problem: Falls - Risk of:  Goal: Will remain free from falls  Description  Will remain free from falls  2/17/2020 0134 by Rashaun Meraz RN  Outcome: Ongoing  Note:   Pt A&Ox4. Pt uses call light appropriately and call light and bedside table remain in reach. Rails up x2 bed in lowest position. Bed alarm is on. Pt wears nonskid socks when standing or ambulating. Fall sign in place. Room remains free of clutter. Pt knows when and how to use assistive device when ambulating.     2/16/2020 1742 by Jose Alfredo Kelly RN  Outcome: Ongoing  Note:   Call light in reach at all times, frequent checks, bed in lowest position, wheels of bed and chair locked, non skid footwear on, appropriate transfer techniques, personal items within reach, walkways free of obstructions, fall risk armband and sign displayed, Root fall risk score per protocol. No falls this shift, will continue to monitor. Goal: Absence of physical injury  Description  Absence of physical injury  Outcome: Ongoing     Problem: Activity:  Goal: Risk for activity intolerance will decrease  Description  Risk for activity intolerance will decrease  2/17/2020 0134 by Christian Urena RN  Outcome: Ongoing  Note:   Patient is encouraged to ambulate to and from the chair, bed, and bathroom. Patient will ambulate at least three times on this shift. Patient is encouraged to do all ADLs within the patient's abilities. PT and OT ordered. Pain medications given at least fifteen minutes before activity when appropriate. 2/16/2020 1742 by Jessica Machado RN  Outcome: Ongoing  Note:   Pt encouraged to ambulate in halls. Problem: Bowel/Gastric:  Goal: Bowel function will improve  Description  Bowel function will improve  2/17/2020 0134 by Christian Urena RN  Outcome: Ongoing  Note:   Patient's I&O's are being checked twice per shift and PRN. Patient has not had diarrhea on this shift. Clear liquid diet. 2/16/2020 1742 by Jessica Machado RN  Outcome: Ongoing  Note:   Pt with 2 episodes of loose stools today. Goal: Diagnostic test results will improve  Description  Diagnostic test results will improve  Outcome: Ongoing  Goal: Occurrences of nausea will decrease  Description  Occurrences of nausea will decrease  Outcome: Ongoing  Goal: Occurrences of vomiting will decrease  Description  Occurrences of vomiting will decrease  Outcome: Ongoing     Problem: Fluid Volume:  Goal: Maintenance of adequate hydration will improve  Description  Maintenance of adequate hydration will improve  2/17/2020 0134 by Christian Urena RN  Outcome: Ongoing  Note:   Maintain hydration by drinking small amounts of clear fluids frequently. Vitals and pulses are checked twice per shift and PRN. Skin is being assessed and I&O's recorded. Labs are being drawn daily.  Respiratory status

## 2020-02-17 NOTE — PROGRESS NOTES
Physical Therapy  Facility/Department: [facility] AT THE [facility] MED SURG  Daily Treatment Note  NAME: Kari Jackson  : 1940  MRN: [id number]    Date of Service: 2020    Discharge Recommendations:  Continue to assess pending progress        Assessment      PT Education: Goals;Plan of Care  Patient Education: Educated pt on above with good understanding noted. REQUIRES PT FOLLOW UP: Yes  Activity Tolerance  Activity Tolerance: Patient Tolerated treatment well     Patient Diagnosis(es): The primary encounter diagnosis was Generalized abdominal pain. A diagnosis of Infectious enteritis, unspecified infectious agent was also pertinent to this visit. has a past medical history of Hyperlipidemia, Hypertension, and Skin cancer. has a past surgical history that includes Colonoscopy; Cataract removal with implant (Right, 2018); pr xcapsl ctrc rmvl insj io lens prosth w/o ecp (Right, 3/5/2018); Cataract removal with implant (Left, 2018); and pr xcapsl ctrc rmvl insj io lens prosth w/o ecp (Left, 2018). Restrictions  Restrictions/Precautions  Restrictions/Precautions: General Precautions  Subjective   General  Chart Reviewed: Yes  Response To Previous Treatment: Patient with no complaints from previous session. Family / Caregiver Present: Yes  Referring Practitioner: Dr. David García: Pt denies pain and stated that his neck was feeling better. Orientation  Orientation  Overall Orientation Status: Within Normal Limits  Cognition      Objective      Transfers  Sit to Stand: Independent  Stand to sit: Independent  Ambulation  Ambulation?: Yes  WB Status: unrstricted   Ambulation 1  Surface: level tile  Device: No Device  Assistance: Independent  Distance: 50 feet x 1  Comments: Pt with improved posture and no cues needed.    Stairs/Curb  Stairs?: No     Balance  Posture: Good  Sitting - Static: Good  Sitting - Dynamic: Good  Standing - Static: Good  Standing - Dynamic:

## 2020-02-18 ENCOUNTER — APPOINTMENT (OUTPATIENT)
Dept: GENERAL RADIOLOGY | Age: 80
DRG: 388 | End: 2020-02-18
Payer: MEDICARE

## 2020-02-18 LAB
ANION GAP SERPL CALCULATED.3IONS-SCNC: 15 MMOL/L (ref 9–17)
BUN BLDV-MCNC: 10 MG/DL (ref 8–23)
BUN/CREAT BLD: 8 (ref 9–20)
CALCIUM SERPL-MCNC: 8.7 MG/DL (ref 8.6–10.4)
CHLORIDE BLD-SCNC: 104 MMOL/L (ref 98–107)
CO2: 20 MMOL/L (ref 20–31)
CREAT SERPL-MCNC: 1.2 MG/DL (ref 0.7–1.2)
GFR AFRICAN AMERICAN: >60 ML/MIN
GFR NON-AFRICAN AMERICAN: 58 ML/MIN
GFR SERPL CREATININE-BSD FRML MDRD: ABNORMAL ML/MIN/{1.73_M2}
GFR SERPL CREATININE-BSD FRML MDRD: ABNORMAL ML/MIN/{1.73_M2}
GLUCOSE BLD-MCNC: 102 MG/DL (ref 70–99)
HCT VFR BLD CALC: 37.8 % (ref 40.7–50.3)
HEMOGLOBIN: 13.3 G/DL (ref 13–17)
MCH RBC QN AUTO: 30.5 PG (ref 25.2–33.5)
MCHC RBC AUTO-ENTMCNC: 35.2 G/DL (ref 28.4–34.8)
MCV RBC AUTO: 86.7 FL (ref 82.6–102.9)
NRBC AUTOMATED: 0 PER 100 WBC
PDW BLD-RTO: 12.7 % (ref 11.8–14.4)
PLATELET # BLD: 160 K/UL (ref 138–453)
PMV BLD AUTO: 10.1 FL (ref 8.1–13.5)
POTASSIUM SERPL-SCNC: 3.6 MMOL/L (ref 3.7–5.3)
RBC # BLD: 4.36 M/UL (ref 4.21–5.77)
SODIUM BLD-SCNC: 139 MMOL/L (ref 135–144)
WBC # BLD: 6.6 K/UL (ref 3.5–11.3)

## 2020-02-18 PROCEDURE — 97110 THERAPEUTIC EXERCISES: CPT

## 2020-02-18 PROCEDURE — 97116 GAIT TRAINING THERAPY: CPT

## 2020-02-18 PROCEDURE — 85027 COMPLETE CBC AUTOMATED: CPT

## 2020-02-18 PROCEDURE — 6360000002 HC RX W HCPCS: Performed by: INTERNAL MEDICINE

## 2020-02-18 PROCEDURE — 36415 COLL VENOUS BLD VENIPUNCTURE: CPT

## 2020-02-18 PROCEDURE — 2580000003 HC RX 258: Performed by: INTERNAL MEDICINE

## 2020-02-18 PROCEDURE — 1200000000 HC SEMI PRIVATE

## 2020-02-18 PROCEDURE — 6360000002 HC RX W HCPCS: Performed by: SURGERY

## 2020-02-18 PROCEDURE — 2580000003 HC RX 258: Performed by: SURGERY

## 2020-02-18 PROCEDURE — 74019 RADEX ABDOMEN 2 VIEWS: CPT

## 2020-02-18 PROCEDURE — 6370000000 HC RX 637 (ALT 250 FOR IP): Performed by: INTERNAL MEDICINE

## 2020-02-18 PROCEDURE — 80048 BASIC METABOLIC PNL TOTAL CA: CPT

## 2020-02-18 RX ORDER — LOSARTAN POTASSIUM 50 MG/1
100 TABLET ORAL DAILY
Status: DISCONTINUED | OUTPATIENT
Start: 2020-02-18 | End: 2020-02-20 | Stop reason: HOSPADM

## 2020-02-18 RX ORDER — HYDROCHLOROTHIAZIDE 25 MG/1
25 TABLET ORAL DAILY
Status: DISCONTINUED | OUTPATIENT
Start: 2020-02-18 | End: 2020-02-20 | Stop reason: HOSPADM

## 2020-02-18 RX ADMIN — Medication 10 ML: at 20:16

## 2020-02-18 RX ADMIN — Medication 10 ML: at 08:37

## 2020-02-18 RX ADMIN — ACETAMINOPHEN 650 MG: 325 TABLET, FILM COATED ORAL at 19:10

## 2020-02-18 RX ADMIN — ACETAMINOPHEN 650 MG: 325 TABLET, FILM COATED ORAL at 01:10

## 2020-02-18 RX ADMIN — Medication 10 ML: at 11:50

## 2020-02-18 RX ADMIN — LOSARTAN POTASSIUM 100 MG: 50 TABLET, FILM COATED ORAL at 09:07

## 2020-02-18 RX ADMIN — PIPERACILLIN AND TAZOBACTAM 3.38 G: 3; .375 INJECTION, POWDER, FOR SOLUTION INTRAVENOUS at 11:50

## 2020-02-18 RX ADMIN — PIPERACILLIN AND TAZOBACTAM 3.38 G: 3; .375 INJECTION, POWDER, FOR SOLUTION INTRAVENOUS at 03:20

## 2020-02-18 RX ADMIN — ENOXAPARIN SODIUM 40 MG: 40 INJECTION, SOLUTION INTRAVENOUS; SUBCUTANEOUS at 08:35

## 2020-02-18 RX ADMIN — PIPERACILLIN AND TAZOBACTAM 3.38 G: 3; .375 INJECTION, POWDER, FOR SOLUTION INTRAVENOUS at 20:16

## 2020-02-18 RX ADMIN — HYDROCHLOROTHIAZIDE 25 MG: 25 TABLET ORAL at 09:07

## 2020-02-18 RX ADMIN — POTASSIUM CHLORIDE 20 MEQ: 20 TABLET, EXTENDED RELEASE ORAL at 08:35

## 2020-02-18 RX ADMIN — POTASSIUM CHLORIDE 20 MEQ: 20 TABLET, EXTENDED RELEASE ORAL at 20:16

## 2020-02-18 ASSESSMENT — PAIN DESCRIPTION - DESCRIPTORS
DESCRIPTORS: ACHING
DESCRIPTORS: ACHING

## 2020-02-18 ASSESSMENT — PAIN SCALES - GENERAL
PAINLEVEL_OUTOF10: 6
PAINLEVEL_OUTOF10: 0
PAINLEVEL_OUTOF10: 0
PAINLEVEL_OUTOF10: 8

## 2020-02-18 ASSESSMENT — PAIN DESCRIPTION - LOCATION
LOCATION: HEAD
LOCATION: NECK

## 2020-02-18 ASSESSMENT — PAIN DESCRIPTION - PAIN TYPE
TYPE: ACUTE PAIN
TYPE: ACUTE PAIN

## 2020-02-18 NOTE — PROGRESS NOTES
Physical Therapy  Facility/Department: [facility] AT THE [facility] MED SURG  Daily Treatment Note  NAME: Wyatt Greene  : 1940  MRN: [id number]    Date of Service: 2020    Discharge Recommendations:  Continue to assess pending progress        Assessment   Treatment Diagnosis: general weakness   Prognosis: Good  PT Education: Goals;Plan of Care  Patient Education: Educated pt on above with good understanding noted. REQUIRES PT FOLLOW UP: Yes  Activity Tolerance  Activity Tolerance: Patient Tolerated treatment well     Patient Diagnosis(es): The primary encounter diagnosis was Generalized abdominal pain. A diagnosis of Infectious enteritis, unspecified infectious agent was also pertinent to this visit. has a past medical history of Hyperlipidemia, Hypertension, and Skin cancer. has a past surgical history that includes Colonoscopy; Cataract removal with implant (Right, 2018); pr xcapsl ctrc rmvl insj io lens prosth w/o ecp (Right, 3/5/2018); Cataract removal with implant (Left, 2018); and pr xcapsl ctrc rmvl insj io lens prosth w/o ecp (Left, 2018). Restrictions  Restrictions/Precautions  Restrictions/Precautions: General Precautions  Subjective   General  Chart Reviewed: Yes  Response To Previous Treatment: Patient with no complaints from previous session. Family / Caregiver Present: Yes  Referring Practitioner: Dr. Donohue Port: Pt denies pain and stated that his neck was feeling better. Orientation  Orientation  Overall Orientation Status: Within Normal Limits  Cognition      Objective   Bed mobility  Supine to Sit: Independent  Sit to Supine: Independent  Scooting: Independent  Transfers  Sit to Stand: Independent  Stand to sit: Independent  Ambulation  Ambulation?: Yes  Ambulation 1  Surface: level tile  Device: No Device  Distance: 350'x1  Comments: Pt with improved posture and no cues needed.    Stairs/Curb  Stairs?: No        Exercises  Comments: Completed

## 2020-02-18 NOTE — FLOWSHEET NOTE
Awake, sitting up in chair at bedside. Vitals checked and assessment done. Denies abdominal pain this morning. States he had a loose BM at 0600. Tolerating clear liquid diet. No needs at present  time.

## 2020-02-18 NOTE — PLAN OF CARE
health care provider will improve  Description  Ability to state signs and symptoms to report to health care provider will improve  Outcome: Ongoing     Problem: Physical Regulation:  Goal: Complications related to the disease process, condition or treatment will be avoided or minimized  Description  Complications related to the disease process, condition or treatment will be avoided or minimized  Outcome: Ongoing  Goal: Ability to maintain clinical measurements within normal limits will improve  Description  Ability to maintain clinical measurements within normal limits will improve  Outcome: Ongoing     Problem: Sensory:  Goal: Pain level will decrease  Description  Pain level will decrease  Outcome: Ongoing  Note:   Pain assessed routinely and as needed with a 0-10 scale. PRN pain medication given as appropriate per orders.  Pain reassessed within an hour, will continue to monitor    Goal: Ability to identify factors that increase the pain will improve  Description  Ability to identify factors that increase the pain will improve  Outcome: Ongoing  Goal: Ability to notify healthcare provider of pain before it becomes unmanageable or unbearable will improve  Description  Ability to notify healthcare provider of pain before it becomes unmanageable or unbearable will improve  Outcome: Ongoing     Problem: Fluid Volume - Imbalance:  Goal: Absence of imbalanced fluid volume signs and symptoms  Description  Absence of imbalanced fluid volume signs and symptoms  Outcome: Ongoing     Problem: Musculor/Skeletal Functional Status  Goal: Highest potential functional level  Outcome: Ongoing  Goal: Absence of falls  Outcome: Ongoing     Problem: Nutrition  Goal: Optimal nutrition therapy  2/17/2020 2048 by Kelsie Guadalupe RN  Outcome: Ongoing  2/17/2020 1445 by Erin Han RD, LD  Outcome: Ongoing  Note:   Nutrition Problem: Inadequate oral intake  Intervention: Food and/or Nutrient Delivery: Start ONS, Continue current diet  Nutritional Goals: PO >75% meals and supplements on advanced diet  Ensure Clear

## 2020-02-19 PROBLEM — E87.20 LACTIC ACIDOSIS: Status: RESOLVED | Noted: 2020-02-12 | Resolved: 2020-02-19

## 2020-02-19 PROBLEM — E86.0 DEHYDRATION: Status: RESOLVED | Noted: 2020-02-12 | Resolved: 2020-02-19

## 2020-02-19 PROBLEM — R11.2 NAUSEA & VOMITING: Status: RESOLVED | Noted: 2020-02-12 | Resolved: 2020-02-19

## 2020-02-19 PROBLEM — K52.9 ENTERITIS: Status: RESOLVED | Noted: 2020-02-12 | Resolved: 2020-02-19

## 2020-02-19 LAB
ANION GAP SERPL CALCULATED.3IONS-SCNC: 16 MMOL/L (ref 9–17)
BUN BLDV-MCNC: 8 MG/DL (ref 8–23)
BUN/CREAT BLD: 7 (ref 9–20)
CALCIUM SERPL-MCNC: 9 MG/DL (ref 8.6–10.4)
CHLORIDE BLD-SCNC: 101 MMOL/L (ref 98–107)
CO2: 22 MMOL/L (ref 20–31)
CREAT SERPL-MCNC: 1.15 MG/DL (ref 0.7–1.2)
GFR AFRICAN AMERICAN: >60 ML/MIN
GFR NON-AFRICAN AMERICAN: >60 ML/MIN
GFR SERPL CREATININE-BSD FRML MDRD: ABNORMAL ML/MIN/{1.73_M2}
GFR SERPL CREATININE-BSD FRML MDRD: ABNORMAL ML/MIN/{1.73_M2}
GLUCOSE BLD-MCNC: 97 MG/DL (ref 70–99)
HCT VFR BLD CALC: 41.3 % (ref 40.7–50.3)
HEMOGLOBIN: 14.1 G/DL (ref 13–17)
MCH RBC QN AUTO: 29.8 PG (ref 25.2–33.5)
MCHC RBC AUTO-ENTMCNC: 34.1 G/DL (ref 28.4–34.8)
MCV RBC AUTO: 87.3 FL (ref 82.6–102.9)
NRBC AUTOMATED: 0 PER 100 WBC
PDW BLD-RTO: 12.8 % (ref 11.8–14.4)
PLATELET # BLD: 190 K/UL (ref 138–453)
PMV BLD AUTO: 10.1 FL (ref 8.1–13.5)
POTASSIUM SERPL-SCNC: 3.5 MMOL/L (ref 3.7–5.3)
RBC # BLD: 4.73 M/UL (ref 4.21–5.77)
SODIUM BLD-SCNC: 139 MMOL/L (ref 135–144)
WBC # BLD: 6.1 K/UL (ref 3.5–11.3)

## 2020-02-19 PROCEDURE — 6360000002 HC RX W HCPCS: Performed by: SURGERY

## 2020-02-19 PROCEDURE — 80048 BASIC METABOLIC PNL TOTAL CA: CPT

## 2020-02-19 PROCEDURE — 1200000000 HC SEMI PRIVATE

## 2020-02-19 PROCEDURE — 2580000003 HC RX 258: Performed by: INTERNAL MEDICINE

## 2020-02-19 PROCEDURE — 2580000003 HC RX 258: Performed by: SURGERY

## 2020-02-19 PROCEDURE — 36415 COLL VENOUS BLD VENIPUNCTURE: CPT

## 2020-02-19 PROCEDURE — 6370000000 HC RX 637 (ALT 250 FOR IP): Performed by: INTERNAL MEDICINE

## 2020-02-19 PROCEDURE — 85027 COMPLETE CBC AUTOMATED: CPT

## 2020-02-19 PROCEDURE — 6360000002 HC RX W HCPCS: Performed by: INTERNAL MEDICINE

## 2020-02-19 RX ORDER — AMLODIPINE BESYLATE 5 MG/1
2.5 TABLET ORAL DAILY
Status: DISCONTINUED | OUTPATIENT
Start: 2020-02-19 | End: 2020-02-20 | Stop reason: HOSPADM

## 2020-02-19 RX ADMIN — AMLODIPINE BESYLATE 2.5 MG: 5 TABLET ORAL at 17:18

## 2020-02-19 RX ADMIN — POTASSIUM CHLORIDE 20 MEQ: 20 TABLET, EXTENDED RELEASE ORAL at 20:35

## 2020-02-19 RX ADMIN — POTASSIUM CHLORIDE 20 MEQ: 20 TABLET, EXTENDED RELEASE ORAL at 08:06

## 2020-02-19 RX ADMIN — HYDROCHLOROTHIAZIDE 25 MG: 25 TABLET ORAL at 08:06

## 2020-02-19 RX ADMIN — LOSARTAN POTASSIUM 100 MG: 50 TABLET, FILM COATED ORAL at 08:06

## 2020-02-19 RX ADMIN — Medication 10 ML: at 20:35

## 2020-02-19 RX ADMIN — Medication 10 ML: at 12:36

## 2020-02-19 RX ADMIN — Medication 10 ML: at 08:05

## 2020-02-19 RX ADMIN — PIPERACILLIN AND TAZOBACTAM 3.38 G: 3; .375 INJECTION, POWDER, FOR SOLUTION INTRAVENOUS at 20:34

## 2020-02-19 RX ADMIN — PIPERACILLIN AND TAZOBACTAM 3.38 G: 3; .375 INJECTION, POWDER, FOR SOLUTION INTRAVENOUS at 03:45

## 2020-02-19 RX ADMIN — PIPERACILLIN AND TAZOBACTAM 3.38 G: 3; .375 INJECTION, POWDER, FOR SOLUTION INTRAVENOUS at 12:36

## 2020-02-19 RX ADMIN — ENOXAPARIN SODIUM 40 MG: 40 INJECTION, SOLUTION INTRAVENOUS; SUBCUTANEOUS at 08:06

## 2020-02-19 ASSESSMENT — PAIN SCALES - GENERAL
PAINLEVEL_OUTOF10: 0

## 2020-02-19 NOTE — PLAN OF CARE
Problem: Pain:  Goal: Pain level will decrease  2/19/2020 0929 by Ria Mehta RN  Outcome: Ongoing  2/18/2020 2220 by Cecelia Fuentes RN  Outcome: Ongoing  Goal: Control of acute pain  2/19/2020 0929 by Ria Mehta RN  Outcome: Ongoing  2/18/2020 2220 by Cecelia Fuentes RN  Outcome: Ongoing  Goal: Control of chronic pain  2/19/2020 0929 by Ria Mehta RN  Outcome: Ongoing  2/18/2020 2220 by Cecelia Fuentes RN  Outcome: Ongoing     Problem: Falls - Risk of:  Goal: Will remain free from falls  2/19/2020 0929 by Ria Mehta RN  Outcome: Ongoing  2/18/2020 2220 by Cecelia Fuentes RN  Outcome: Ongoing  Goal: Absence of physical injury  2/19/2020 0929 by Ria Mehta RN  Outcome: Ongoing  2/18/2020 2220 by Cecelia Fuentes RN  Outcome: Ongoing     Problem: Activity:  Goal: Risk for activity intolerance will decrease  2/19/2020 0929 by Ria Mehta RN  Outcome: Ongoing  2/18/2020 2220 by Cecelia Fuentes RN  Outcome: Ongoing     Problem:  Bowel/Gastric:  Goal: Bowel function will improve  2/19/2020 0929 by Ria Mehta RN  Outcome: Ongoing  2/18/2020 2220 by Cecelia Fuentes RN  Outcome: Ongoing  Goal: Diagnostic test results will improve  2/19/2020 0929 by Ria Mehta RN  Outcome: Ongoing  2/18/2020 2220 by Cecelia Fuentes RN  Outcome: Ongoing  Goal: Occurrences of nausea will decrease  2/19/2020 0929 by Ria Mehta RN  Outcome: Ongoing  2/18/2020 2220 by Cecelia Fuentes RN  Outcome: Ongoing  Goal: Occurrences of vomiting will decrease  2/19/2020 0929 by Ria Mehta RN  Outcome: Ongoing  2/18/2020 2220 by Cecelia Fuentes RN  Outcome: Ongoing     Problem: Fluid Volume:  Goal: Maintenance of adequate hydration will improve  2/19/2020 0929 by Ria Mehta RN  Outcome: Ongoing  2/18/2020 2220 by Cecelia Fuentes RN  Outcome: Ongoing     Problem: Health Behavior:  Goal: Ability to state signs and symptoms to report to health care provider will improve  2/19/2020 0929 by Ria Mehta RN  Outcome:

## 2020-02-19 NOTE — FLOWSHEET NOTE
Talked with Dr Daija Elizabeth and Dr Emely Donahue increased to a full liquid diet. Will advance to a soft diet if tolerates full liquids.

## 2020-02-19 NOTE — PLAN OF CARE
Problem: Pain:  Goal: Pain level will decrease  Description  Pain level will decrease  Outcome: Ongoing  Note:   Pain assessed routinely and as needed with a 0-10 scale. PRN pain medication given as appropriate per orders. Pain reassessed within an hour, will continue to monitor    Goal: Control of acute pain  Description  Control of acute pain  Outcome: Ongoing  Goal: Control of chronic pain  Description  Control of chronic pain  Outcome: Ongoing     Problem: Falls - Risk of:  Goal: Will remain free from falls  Description  Will remain free from falls  Outcome: Ongoing  Note:   Fall risk assessment done and patient is a low risk for falls. Alarms on as needed for patient safety. Patient being monitored on a regular basis. No falls noted at this time. Goal: Absence of physical injury  Description  Absence of physical injury  Outcome: Ongoing     Problem: Activity:  Goal: Risk for activity intolerance will decrease  Description  Risk for activity intolerance will decrease  Outcome: Ongoing  Note:   Pt ambulates safely by self. Will continue to monitor. Problem: Bowel/Gastric:  Goal: Bowel function will improve  Description  Bowel function will improve  Outcome: Ongoing  Note:   Bowel sounds are active, will continue to monitor. Goal: Diagnostic test results will improve  Description  Diagnostic test results will improve  Outcome: Ongoing  Goal: Occurrences of nausea will decrease  Description  Occurrences of nausea will decrease  Outcome: Ongoing  Goal: Occurrences of vomiting will decrease  Description  Occurrences of vomiting will decrease  Outcome: Ongoing     Problem: Fluid Volume:  Goal: Maintenance of adequate hydration will improve  Description  Maintenance of adequate hydration will improve  Outcome: Ongoing  Note:   Iv locked, tolerating clear liquid diet, will continue to monitor.      Problem: Health Behavior:  Goal: Ability to state signs and symptoms to report to health care provider will improve  Description  Ability to state signs and symptoms to report to health care provider will improve  Outcome: Ongoing     Problem: Physical Regulation:  Goal: Complications related to the disease process, condition or treatment will be avoided or minimized  Description  Complications related to the disease process, condition or treatment will be avoided or minimized  Outcome: Ongoing  Goal: Ability to maintain clinical measurements within normal limits will improve  Description  Ability to maintain clinical measurements within normal limits will improve  Outcome: Ongoing     Problem: Sensory:  Goal: Pain level will decrease  Description  Pain level will decrease  Outcome: Ongoing  Note:   Pain assessed routinely and as needed with a 0-10 scale. PRN pain medication given as appropriate per orders.  Pain reassessed within an hour, will continue to monitor    Goal: Ability to identify factors that increase the pain will improve  Description  Ability to identify factors that increase the pain will improve  Outcome: Ongoing  Goal: Ability to notify healthcare provider of pain before it becomes unmanageable or unbearable will improve  Description  Ability to notify healthcare provider of pain before it becomes unmanageable or unbearable will improve  Outcome: Ongoing     Problem: Fluid Volume - Imbalance:  Goal: Absence of imbalanced fluid volume signs and symptoms  Description  Absence of imbalanced fluid volume signs and symptoms  Outcome: Ongoing     Problem: Musculor/Skeletal Functional Status  Goal: Highest potential functional level  Outcome: Ongoing  Goal: Absence of falls  Outcome: Ongoing     Problem: Nutrition  Goal: Optimal nutrition therapy  Outcome: Ongoing

## 2020-02-20 VITALS
TEMPERATURE: 97.9 F | WEIGHT: 205.1 LBS | HEART RATE: 88 BPM | DIASTOLIC BLOOD PRESSURE: 68 MMHG | HEIGHT: 72 IN | SYSTOLIC BLOOD PRESSURE: 150 MMHG | BODY MASS INDEX: 27.78 KG/M2 | OXYGEN SATURATION: 95 % | RESPIRATION RATE: 18 BRPM

## 2020-02-20 LAB
ANION GAP SERPL CALCULATED.3IONS-SCNC: 15 MMOL/L (ref 9–17)
BUN BLDV-MCNC: 7 MG/DL (ref 8–23)
BUN/CREAT BLD: 6 (ref 9–20)
CALCIUM SERPL-MCNC: 9 MG/DL (ref 8.6–10.4)
CHLORIDE BLD-SCNC: 99 MMOL/L (ref 98–107)
CO2: 20 MMOL/L (ref 20–31)
CREAT SERPL-MCNC: 1.19 MG/DL (ref 0.7–1.2)
GFR AFRICAN AMERICAN: >60 ML/MIN
GFR NON-AFRICAN AMERICAN: 59 ML/MIN
GFR SERPL CREATININE-BSD FRML MDRD: ABNORMAL ML/MIN/{1.73_M2}
GFR SERPL CREATININE-BSD FRML MDRD: ABNORMAL ML/MIN/{1.73_M2}
GLUCOSE BLD-MCNC: 132 MG/DL (ref 70–99)
HCT VFR BLD CALC: 40.9 % (ref 40.7–50.3)
HEMOGLOBIN: 14.3 G/DL (ref 13–17)
MCH RBC QN AUTO: 30.2 PG (ref 25.2–33.5)
MCHC RBC AUTO-ENTMCNC: 35 G/DL (ref 28.4–34.8)
MCV RBC AUTO: 86.3 FL (ref 82.6–102.9)
NRBC AUTOMATED: 0 PER 100 WBC
PDW BLD-RTO: 12.6 % (ref 11.8–14.4)
PLATELET # BLD: 181 K/UL (ref 138–453)
PMV BLD AUTO: 10.1 FL (ref 8.1–13.5)
POTASSIUM SERPL-SCNC: 3.1 MMOL/L (ref 3.7–5.3)
RBC # BLD: 4.74 M/UL (ref 4.21–5.77)
SODIUM BLD-SCNC: 134 MMOL/L (ref 135–144)
WBC # BLD: 7.8 K/UL (ref 3.5–11.3)

## 2020-02-20 PROCEDURE — 6360000002 HC RX W HCPCS: Performed by: INTERNAL MEDICINE

## 2020-02-20 PROCEDURE — 80048 BASIC METABOLIC PNL TOTAL CA: CPT

## 2020-02-20 PROCEDURE — 36415 COLL VENOUS BLD VENIPUNCTURE: CPT

## 2020-02-20 PROCEDURE — 6370000000 HC RX 637 (ALT 250 FOR IP): Performed by: INTERNAL MEDICINE

## 2020-02-20 PROCEDURE — 6360000002 HC RX W HCPCS: Performed by: SURGERY

## 2020-02-20 PROCEDURE — 2580000003 HC RX 258: Performed by: SURGERY

## 2020-02-20 PROCEDURE — 85027 COMPLETE CBC AUTOMATED: CPT

## 2020-02-20 RX ORDER — POTASSIUM CHLORIDE 20 MEQ/1
20 TABLET, EXTENDED RELEASE ORAL 2 TIMES DAILY WITH MEALS
Status: COMPLETED | OUTPATIENT
Start: 2020-02-20 | End: 2020-02-20

## 2020-02-20 RX ORDER — POTASSIUM CHLORIDE 20 MEQ/1
20 TABLET, EXTENDED RELEASE ORAL 2 TIMES DAILY
Qty: 60 TABLET | Refills: 3 | Status: SHIPPED | OUTPATIENT
Start: 2020-02-20 | End: 2021-01-20

## 2020-02-20 RX ORDER — AMLODIPINE BESYLATE 2.5 MG/1
2.5 TABLET ORAL DAILY
Qty: 30 TABLET | Refills: 3 | Status: SHIPPED | OUTPATIENT
Start: 2020-02-20 | End: 2020-11-25 | Stop reason: ALTCHOICE

## 2020-02-20 RX ADMIN — LOSARTAN POTASSIUM 100 MG: 50 TABLET, FILM COATED ORAL at 09:44

## 2020-02-20 RX ADMIN — AMLODIPINE BESYLATE 2.5 MG: 5 TABLET ORAL at 09:44

## 2020-02-20 RX ADMIN — HYDROCHLOROTHIAZIDE 25 MG: 25 TABLET ORAL at 09:44

## 2020-02-20 RX ADMIN — POTASSIUM CHLORIDE 20 MEQ: 20 TABLET, EXTENDED RELEASE ORAL at 09:44

## 2020-02-20 RX ADMIN — POTASSIUM CHLORIDE 20 MEQ: 20 TABLET, EXTENDED RELEASE ORAL at 10:30

## 2020-02-20 RX ADMIN — ENOXAPARIN SODIUM 40 MG: 40 INJECTION, SOLUTION INTRAVENOUS; SUBCUTANEOUS at 09:44

## 2020-02-20 RX ADMIN — PIPERACILLIN AND TAZOBACTAM 3.38 G: 3; .375 INJECTION, POWDER, FOR SOLUTION INTRAVENOUS at 03:57

## 2020-02-20 NOTE — PROGRESS NOTES
Initial assessment done at this time. Pt is A&O x4. Denies any pain at this time. Bowel sounds active in all quadrants. Ate turkey, mashed potatoes and peaches for dinner and tolerating well. Denies n/v. Pt is resting comfortably in bed with eyes closed at this time with call light and belongings within reach. Will continue to monitor.

## 2020-02-21 NOTE — DISCHARGE SUMMARY
[address], [place] [address]                               DISCHARGE SUMMARY    PATIENT NAME: Marleny Bello                     :        1940  MED REC NO:   [id number]                              ROOM:       [id number]  ACCOUNT NO:   [de-identified]                           ADMIT DATE: 2020  PROVIDER:     Kenyon Anderson                [facility] [place], operated [facility] DATE: 2020    FINAL DIAGNOSES:  1.  Small bowel obstruction, exact etiology not clear. 2.  Diverticulosis without diverticulitis. 3.  Hypertension. 4.  Hyperlipidemia. 5.  Glucose intolerance/diabetes type 2. CONDITION ON DISCHARGE:  Improved. COMPLICATIONS:  Hypokalemia and diastolic congestive heart failure. DISPOSITION:  Home. CONSULTATIONS:  Dr. Kenji Das. HOSPITAL COURSE:  This patient was admitted through the emergency room  with a history of nausea, abdominal pain, and was diagnosed to have  incomplete small bowel obstruction. The exact etiology was not clear,  seen by Dr. Kenji Das, and he was treated conservatively, initially with IV  fluids, n.p.o., etc.    With very large amounts of IV fluids required, he did go into acute  diastolic congestive heart failure and was treated conservatively with a  small amounts of diuretics. He has tolerated the diet well. His blood pressure went up and came  down today. He is able to tolerate the solid food and he is being  discharged today on diet as tolerated, activity as tolerated, and will  see me in the office in a week and he will have lab work, namely BMP  before he sees me. We have added amlodipine 2.5 mg daily. No other blood pressure  medication changes except because of hypokalemia, he is also on K-Dur 20  b.i.d. and prescriptions were sent for these two medications for only a  month. Today's lab work shows his blood sugar of 132, BUN 7, creatinine 1.19,  potassium 3.1, sodium 134.         [facility]

## 2020-02-26 ENCOUNTER — HOSPITAL ENCOUNTER (OUTPATIENT)
Age: 80
Discharge: HOME | End: 2020-02-26
Payer: MEDICARE

## 2020-02-26 LAB
ANION GAP SERPL CALCULATED.3IONS-SCNC: 12 MMOL/L (ref 9–17)
BUN BLDV-MCNC: 21 MG/DL (ref 8–23)
BUN/CREAT BLD: 16 (ref 9–20)
CALCIUM SERPL-MCNC: 9.4 MG/DL (ref 8.6–10.4)
CHLORIDE BLD-SCNC: 102 MMOL/L (ref 98–107)
CO2: 24 MMOL/L (ref 20–31)
CREAT SERPL-MCNC: 1.33 MG/DL (ref 0.7–1.2)
GFR AFRICAN AMERICAN: >60 ML/MIN
GFR NON-AFRICAN AMERICAN: 52 ML/MIN
GFR SERPL CREATININE-BSD FRML MDRD: ABNORMAL ML/MIN/{1.73_M2}
GFR SERPL CREATININE-BSD FRML MDRD: ABNORMAL ML/MIN/{1.73_M2}
GLUCOSE BLD-MCNC: 102 MG/DL (ref 70–99)
POTASSIUM SERPL-SCNC: 4.8 MMOL/L (ref 3.7–5.3)
SODIUM BLD-SCNC: 138 MMOL/L (ref 135–144)

## 2020-02-26 PROCEDURE — 36415 COLL VENOUS BLD VENIPUNCTURE: CPT

## 2020-02-26 PROCEDURE — 80048 BASIC METABOLIC PNL TOTAL CA: CPT

## 2020-02-27 ENCOUNTER — HOSPITAL ENCOUNTER (OUTPATIENT)
Age: 80
Discharge: HOME | End: 2020-02-29
Payer: MEDICARE

## 2020-02-27 ENCOUNTER — HOSPITAL ENCOUNTER (OUTPATIENT)
Dept: GENERAL RADIOLOGY | Age: 80
Discharge: HOME | End: 2020-02-29
Payer: MEDICARE

## 2020-02-27 PROCEDURE — 74019 RADEX ABDOMEN 2 VIEWS: CPT

## 2020-03-06 ENCOUNTER — OFFICE VISIT (OUTPATIENT)
Dept: SURGERY | Age: 80
End: 2020-03-06
Payer: MEDICARE

## 2020-03-06 VITALS
HEART RATE: 79 BPM | BODY MASS INDEX: 26.41 KG/M2 | WEIGHT: 195 LBS | HEIGHT: 72 IN | SYSTOLIC BLOOD PRESSURE: 134 MMHG | DIASTOLIC BLOOD PRESSURE: 84 MMHG

## 2020-03-06 PROCEDURE — 99211 OFF/OP EST MAY X REQ PHY/QHP: CPT | Performed by: SURGERY

## 2020-03-06 PROCEDURE — G8484 FLU IMMUNIZE NO ADMIN: HCPCS | Performed by: SURGERY

## 2020-03-06 PROCEDURE — 4040F PNEUMOC VAC/ADMIN/RCVD: CPT | Performed by: SURGERY

## 2020-03-06 PROCEDURE — 99211 OFF/OP EST MAY X REQ PHY/QHP: CPT

## 2020-03-06 PROCEDURE — 99214 OFFICE O/P EST MOD 30 MIN: CPT | Performed by: SURGERY

## 2020-03-06 PROCEDURE — 1111F DSCHRG MED/CURRENT MED MERGE: CPT | Performed by: SURGERY

## 2020-03-06 PROCEDURE — 1036F TOBACCO NON-USER: CPT | Performed by: SURGERY

## 2020-03-06 PROCEDURE — G8419 CALC BMI OUT NRM PARAM NOF/U: HCPCS | Performed by: SURGERY

## 2020-03-06 PROCEDURE — G8427 DOCREV CUR MEDS BY ELIG CLIN: HCPCS | Performed by: SURGERY

## 2020-03-06 PROCEDURE — 1123F ACP DISCUSS/DSCN MKR DOCD: CPT | Performed by: SURGERY

## 2020-03-06 RX ORDER — OMEGA-3S/DHA/EPA/FISH OIL/D3 300MG-1000
400 CAPSULE ORAL DAILY
COMMUNITY

## 2020-11-25 ENCOUNTER — OFFICE VISIT (OUTPATIENT)
Dept: FAMILY MEDICINE CLINIC | Age: 80
End: 2020-11-25
Payer: MEDICARE

## 2020-11-25 VITALS
HEART RATE: 96 BPM | SYSTOLIC BLOOD PRESSURE: 142 MMHG | HEIGHT: 72 IN | WEIGHT: 205 LBS | DIASTOLIC BLOOD PRESSURE: 82 MMHG | BODY MASS INDEX: 27.77 KG/M2

## 2020-11-25 PROBLEM — K52.9 CHRONIC DIARRHEA: Status: ACTIVE | Noted: 2020-11-25

## 2020-11-25 PROBLEM — E78.49 OTHER HYPERLIPIDEMIA: Status: ACTIVE | Noted: 2020-11-25

## 2020-11-25 PROBLEM — I10 ESSENTIAL HYPERTENSION: Status: ACTIVE | Noted: 2020-11-25

## 2020-11-25 PROCEDURE — 99204 OFFICE O/P NEW MOD 45 MIN: CPT | Performed by: NURSE PRACTITIONER

## 2020-11-25 PROCEDURE — G8417 CALC BMI ABV UP PARAM F/U: HCPCS | Performed by: NURSE PRACTITIONER

## 2020-11-25 PROCEDURE — G8482 FLU IMMUNIZE ORDER/ADMIN: HCPCS | Performed by: NURSE PRACTITIONER

## 2020-11-25 PROCEDURE — 99211 OFF/OP EST MAY X REQ PHY/QHP: CPT | Performed by: NURSE PRACTITIONER

## 2020-11-25 PROCEDURE — 1123F ACP DISCUSS/DSCN MKR DOCD: CPT | Performed by: NURSE PRACTITIONER

## 2020-11-25 PROCEDURE — G8427 DOCREV CUR MEDS BY ELIG CLIN: HCPCS | Performed by: NURSE PRACTITIONER

## 2020-11-25 PROCEDURE — 1036F TOBACCO NON-USER: CPT | Performed by: NURSE PRACTITIONER

## 2020-11-25 PROCEDURE — 4040F PNEUMOC VAC/ADMIN/RCVD: CPT | Performed by: NURSE PRACTITIONER

## 2020-11-25 RX ORDER — AMLODIPINE BESYLATE 5 MG/1
5 TABLET ORAL DAILY
Qty: 90 TABLET | Refills: 1 | Status: SHIPPED | OUTPATIENT
Start: 2020-11-25 | End: 2020-12-22 | Stop reason: ALTCHOICE

## 2020-11-25 ASSESSMENT — ENCOUNTER SYMPTOMS
ABDOMINAL DISTENTION: 0
DIARRHEA: 1
CHEST TIGHTNESS: 0
ANAL BLEEDING: 1
NAUSEA: 0
SINUS PRESSURE: 0
VOMITING: 0
WHEEZING: 0
CONSTIPATION: 0
ABDOMINAL PAIN: 1
SORE THROAT: 0
RHINORRHEA: 1
COUGH: 0
SINUS PAIN: 0
SHORTNESS OF BREATH: 0

## 2020-11-25 ASSESSMENT — PATIENT HEALTH QUESTIONNAIRE - PHQ9
SUM OF ALL RESPONSES TO PHQ QUESTIONS 1-9: 0
2. FEELING DOWN, DEPRESSED OR HOPELESS: 0
SUM OF ALL RESPONSES TO PHQ9 QUESTIONS 1 & 2: 0
SUM OF ALL RESPONSES TO PHQ QUESTIONS 1-9: 0
1. LITTLE INTEREST OR PLEASURE IN DOING THINGS: 0
SUM OF ALL RESPONSES TO PHQ QUESTIONS 1-9: 0

## 2020-11-25 NOTE — PROGRESS NOTES
started to drink more water. Patient admitted to hospital 2/2020 with symptoms of nausea and abdominal pain with diagnosis of incomplete small bowel obstruction with unknown etiology. He was treated conservatively with IV fluids and was discharged with improved symptoms. He consulted with Dr. Archana Arzate (gen surgery) 3/2020 who stated, \"I do not think after re-reviewing imaging studies, that a colonoscopy is warranted since the inflammation was all focused on the small bowel and that he is 79 yo. I recommend conservative therapy/observation. \". Dr. Archana Arzate also mentioned during this consultation that if symptoms recur, the patient may benefit from a CT enterography with oral contrast.    Past Medical History:     Past Medical History:   Diagnosis Date    Hyperlipidemia     Hypertension     Skin cancer       Reviewed all health maintenance requirements and ordered appropriate tests  Health Maintenance Due   Topic Date Due    Annual Wellness Visit (AWV)  02/14/2020       Past Surgical History:     Past Surgical History:   Procedure Laterality Date    CATARACT REMOVAL WITH IMPLANT Right 03/05/2018    per Dr. Landon De La Cruz Left 04/02/2018    COLONOSCOPY      NM Anna Drop IO LENS PROSTH W/O ECP Right 3/5/2018    EYE CATARACT EMULSIFICATION IOL IMPLANT performed by Cecelia Pires DO at [address] [facility] W/O ECP Left 4/2/2018    EYE CATARACT EMULSIFICATION IOL IMPLANT performed by Cecelia Pires DO at [address]        Medications:       Prior to Admission medications    Medication Sig Start Date End Date Taking?  Authorizing Provider   amLODIPine (NORVASC) 5 MG tablet Take 1 tablet by mouth daily 11/25/20  Yes GOLD Huynh - CNP   vitamin D3 (CHOLECALCIFEROL) 10 MCG (400 UNIT) TABS tablet Take 400 Units by mouth daily   Yes Historical Provider, MD   losartan-hydrochlorothiazide (HYZAAR) 100-25 MG per tablet Take 1 tablet by mouth daily Yes Historical Provider, MD   loratadine (CLARITIN) 10 MG capsule Take 10 mg by mouth daily   Yes Historical Provider, MD   potassium chloride (KLOR-CON M) 20 MEQ extended release tablet Take 1 tablet by mouth 2 times daily  Patient not taking: Reported on 11/25/2020 2/20/20   Colt Galdmaez MD   aspirin 81 MG tablet Take 81 mg by mouth daily    Historical Provider, MD        Allergies:       Terramycin [oxytetracycline] and Lactose    Social History:     Tobacco:    reports that he quit smoking about 32 years ago. He has never used smokeless tobacco.  Alcohol:      reports current alcohol use. Drug Use:  reports no history of drug use. Family History:     No family history on file. Review of Systems:     Positive and Negative as described in HPI    Review of Systems   Constitutional: Negative for chills, fatigue, fever and unexpected weight change. HENT: Positive for rhinorrhea. Negative for congestion, sinus pressure, sinus pain and sore throat. Eyes: Negative for visual disturbance. Respiratory: Negative for cough, chest tightness, shortness of breath and wheezing. Cardiovascular: Negative for chest pain, palpitations and leg swelling. Gastrointestinal: Positive for abdominal pain, anal bleeding and diarrhea. Negative for abdominal distention, constipation, nausea and vomiting. Genitourinary: Positive for difficulty urinating (Admits weakned urine stream). Musculoskeletal: Positive for arthralgias (Admits bilateral thumb pain with associated decreased strength.). Negative for joint swelling and myalgias. Skin: Negative for rash. Neurological: Positive for light-headedness (with standing, occasionally). Negative for dizziness and headaches. 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 Pain is described as burning, and pins-and-needles. Pain resolved with continued activity.        Physical Exam:   Vitals:  BP (!) 142/82   Pulse 96   Ht 6' (1.829 m)   Wt 205 lb (93 kg)   BMI 27.80 kg/m²     Physical Exam  Constitutional:       General: He is not in acute distress. Appearance: Normal appearance. He is normal weight. He is not ill-appearing or toxic-appearing. HENT:      Head: Normocephalic. Right Ear: Tympanic membrane, ear canal and external ear normal. There is no impacted cerumen. Left Ear: Tympanic membrane, ear canal and external ear normal. There is no impacted cerumen. Nose: Nose normal. No congestion or rhinorrhea. Mouth/Throat:      Mouth: Mucous membranes are moist.      Pharynx: No oropharyngeal exudate or posterior oropharyngeal erythema. Eyes:      General:         Right eye: No discharge. Left eye: No discharge. Extraocular Movements: Extraocular movements intact. Conjunctiva/sclera: Conjunctivae normal.      Pupils: Pupils are equal, round, and reactive to light. Neck:      Musculoskeletal: Neck supple. Cardiovascular:      Rate and Rhythm: Normal rate and regular rhythm. Heart sounds: Normal heart sounds. No murmur. Pulmonary:      Effort: Pulmonary effort is normal. No respiratory distress. Breath sounds: Normal breath sounds. No stridor. No wheezing, rhonchi or rales. Abdominal:      General: Abdomen is flat. Bowel sounds are normal. There is no distension. Palpations: Abdomen is soft. There is no mass. Tenderness: There is no abdominal tenderness. There is no guarding or rebound. Hernia: No hernia is present. Genitourinary:     Comments: No gross visualization of external hemorrhoids. No internal hemorrhoids palpated on digital rectal examination. No blood present. No rectal masses. Prostate without nodules and non-tender. Lymphadenopathy:      Cervical: No cervical adenopathy. Skin:     General: Skin is warm. Neurological:      Mental Status: He is alert and oriented to person, place, and time.    Psychiatric: Mood and Affect: Mood normal.         Behavior: Behavior normal.         Thought Content: Thought content normal.         Judgment: Judgment normal.       Chaperone offered to patient during FARZANA, patient declined. Data:     Lab Results   Component Value Date     02/26/2020    K 4.8 02/26/2020     02/26/2020    CO2 24 02/26/2020    BUN 21 02/26/2020    CREATININE 1.33 02/26/2020    GLUCOSE 102 02/26/2020    PROT 7.5 02/11/2020    LABALBU 5.0 02/11/2020    BILITOT 0.45 02/11/2020    ALKPHOS 63 02/11/2020    AST 20 02/11/2020    ALT 23 02/11/2020     Lab Results   Component Value Date    WBC 7.8 02/20/2020    RBC 4.74 02/20/2020    HGB 14.3 02/20/2020    HCT 40.9 02/20/2020    MCV 86.3 02/20/2020    MCH 30.2 02/20/2020    MCHC 35.0 02/20/2020    RDW 12.6 02/20/2020     02/20/2020    MPV 10.1 02/20/2020     No results found for: TSH  No results found for: CHOL, HDL, PSA, LABA1C    Assessment/Plan:      Diagnosis Orders   1. Essential hypertension  ALT    AST    Basic Metabolic Panel    CBC   2. Other hyperlipidemia  Lipid Panel   3. Elevated glucose  Hemoglobin A1C   4. Lower abdominal pain  XR ABDOMEN (2 VIEWS)    Occult Blood Screen    Ova and parasite screen    Clostridium Difficile Toxin/Antigen    Culture, Aerobic   5. Chronic diarrhea  Ova and parasite screen    Clostridium Difficile Toxin/Antigen    Culture, Aerobic       Hypertension:   - Based on office readings of 140/80 and similar at-home readings, will increase amlodipine from 2.5 mg to 5 mg daily.  - Recommended low-sodium diet. - Monitor BP three times weekly and bring records to next visit for review    Rectal bleeding:  - No evidence of hemorrhoids on examination today. Reviewed signs and symptoms of hemorrhoids and when to notify the office. Discussed use of over-the-counter steroid creams for symptom relief. Diarrhea:   - Symptoms chronic, improving within the last week.  Discussed dietary factors that can aggravate symptoms. Recommend avoidance of dairy.   - Based on recent history of incomplete bowel obstruction 2/2020, will repeat abdominal x-ray to assess for any abnormalities, including distention.  - Order for stool tests placed today for further evaluation.  - Patient admits strong disinterest in colonoscopy. Will consider this for further evaluation or CT enterography with oral contrast as Dr. Antonia Paige recommended if symptoms continue. Wellness:   - Recommended flonase for nasal symptoms.   - Offered gabapentin for neuropathy symptoms. Patient declines. - Increase water intake to assist with lightheadedness with positional changes. - Reviewed labs with patient from previous provider. Most recent GFR 52. Recommended increased water intake, decreased sodium intake, and decreased NSAID use.   - Voltaren gel for bilateral thumb pain    Completed Refills   Requested Prescriptions     Signed Prescriptions Disp Refills    amLODIPine (NORVASC) 5 MG tablet 90 tablet 1     Sig: Take 1 tablet by mouth daily       Orders Placed This Encounter   Procedures    Ova and parasite screen     Standing Status:   Future     Standing Expiration Date:   11/25/2021    Clostridium Difficile Toxin/Antigen     Standing Status:   Future     Standing Expiration Date:   11/25/2021    Culture, Aerobic     stool     Standing Status:   Future     Standing Expiration Date:   11/25/2021    XR ABDOMEN (2 VIEWS)     Standing Status:   Future     Standing Expiration Date:   11/25/2021     Order Specific Question:   Reason for exam:     Answer:   lower abdominal pain    ALT     Standing Status:   Future     Standing Expiration Date:   11/25/2021    AST     Standing Status:   Future     Standing Expiration Date:   11/25/2021    Basic Metabolic Panel     Standing Status:   Future     Standing Expiration Date:   11/25/2021    CBC     Standing Status:   Future     Standing Expiration Date:   11/25/2021    Hemoglobin A1C     Standing Status: Future     Standing Expiration Date:   11/25/2021    Lipid Panel     Standing Status:   Future     Standing Expiration Date:   11/25/2021     Order Specific Question:   Is Patient Fasting?/# of Hours     Answer:   fasting    Occult Blood Screen     Standing Status:   Future     Standing Expiration Date:   11/25/2021        No results found for this visit on 11/25/20. Return in about 4 weeks (around 12/23/2020), or if symptoms worsen or fail to improve, for f/u. labs now.  .    Electronically signed by GOLD Souza CNP on 11/25/20 at 12:12 PM.

## 2020-11-25 NOTE — PATIENT INSTRUCTIONS
Patient to try Voltaren gel (over the counter) for thumb pain. Patient to take flonase (over the counter) as needed for nasal symptoms. Will increase amlodipine from 2.5mg to 5mg once daily. Monitor your blood pressure three times a week and record. SURVEY:    You may be receiving a survey from [employer] regarding your visit today. Please complete the survey to enable us to provide the highest quality of care to you and your family. If you are unable to score a \"very good' on any question, please call the office to discuss how we can improve your experience. We appreciate your feedback. Thank you!

## 2020-11-30 ENCOUNTER — HOSPITAL ENCOUNTER (OUTPATIENT)
Age: 80
Discharge: HOME | End: 2020-11-30
Payer: MEDICARE

## 2020-11-30 ENCOUNTER — HOSPITAL ENCOUNTER (OUTPATIENT)
Age: 80
Discharge: HOME | End: 2020-12-02
Payer: MEDICARE

## 2020-11-30 ENCOUNTER — HOSPITAL ENCOUNTER (OUTPATIENT)
Dept: GENERAL RADIOLOGY | Age: 80
Discharge: HOME | End: 2020-12-02
Payer: MEDICARE

## 2020-11-30 ENCOUNTER — TELEPHONE (OUTPATIENT)
Dept: FAMILY MEDICINE CLINIC | Age: 80
End: 2020-11-30

## 2020-11-30 LAB
ALT SERPL-CCNC: 17 U/L (ref 5–41)
ANION GAP SERPL CALCULATED.3IONS-SCNC: 11 MMOL/L (ref 9–17)
AST SERPL-CCNC: 16 U/L
BUN BLDV-MCNC: 26 MG/DL (ref 8–23)
BUN/CREAT BLD: 18 (ref 9–20)
CALCIUM SERPL-MCNC: 9.7 MG/DL (ref 8.6–10.4)
CHLORIDE BLD-SCNC: 100 MMOL/L (ref 98–107)
CHOLESTEROL/HDL RATIO: 5.8
CHOLESTEROL: 175 MG/DL
CO2: 24 MMOL/L (ref 20–31)
CREAT SERPL-MCNC: 1.47 MG/DL (ref 0.7–1.2)
ESTIMATED AVERAGE GLUCOSE: 105 MG/DL
GFR AFRICAN AMERICAN: 56 ML/MIN
GFR NON-AFRICAN AMERICAN: 46 ML/MIN
GFR SERPL CREATININE-BSD FRML MDRD: ABNORMAL ML/MIN/{1.73_M2}
GFR SERPL CREATININE-BSD FRML MDRD: ABNORMAL ML/MIN/{1.73_M2}
GLUCOSE BLD-MCNC: 103 MG/DL (ref 70–99)
HBA1C MFR BLD: 5.3 % (ref 4–6)
HCT VFR BLD CALC: 45 % (ref 40.7–50.3)
HDLC SERPL-MCNC: 30 MG/DL
HEMOGLOBIN: 15.7 G/DL (ref 13–17)
LDL CHOLESTEROL: 69 MG/DL (ref 0–130)
MCH RBC QN AUTO: 31.2 PG (ref 25.2–33.5)
MCHC RBC AUTO-ENTMCNC: 34.9 G/DL (ref 28.4–34.8)
MCV RBC AUTO: 89.5 FL (ref 82.6–102.9)
NRBC AUTOMATED: 0 PER 100 WBC
PDW BLD-RTO: 12.8 % (ref 11.8–14.4)
PLATELET # BLD: 143 K/UL (ref 138–453)
PMV BLD AUTO: 10.3 FL (ref 8.1–13.5)
POTASSIUM SERPL-SCNC: 4.2 MMOL/L (ref 3.7–5.3)
RBC # BLD: 5.03 M/UL (ref 4.21–5.77)
SODIUM BLD-SCNC: 135 MMOL/L (ref 135–144)
TRIGL SERPL-MCNC: 378 MG/DL
VLDLC SERPL CALC-MCNC: ABNORMAL MG/DL (ref 1–30)
WBC # BLD: 10.3 K/UL (ref 3.5–11.3)

## 2020-11-30 PROCEDURE — 85027 COMPLETE CBC AUTOMATED: CPT

## 2020-11-30 PROCEDURE — 74019 RADEX ABDOMEN 2 VIEWS: CPT

## 2020-11-30 PROCEDURE — 36415 COLL VENOUS BLD VENIPUNCTURE: CPT

## 2020-11-30 PROCEDURE — 84450 TRANSFERASE (AST) (SGOT): CPT

## 2020-11-30 PROCEDURE — 84460 ALANINE AMINO (ALT) (SGPT): CPT

## 2020-11-30 PROCEDURE — 80061 LIPID PANEL: CPT

## 2020-11-30 PROCEDURE — 80048 BASIC METABOLIC PNL TOTAL CA: CPT

## 2020-11-30 PROCEDURE — 83036 HEMOGLOBIN GLYCOSYLATED A1C: CPT

## 2020-11-30 RX ORDER — ATORVASTATIN CALCIUM 10 MG/1
10 TABLET, FILM COATED ORAL DAILY
Qty: 30 TABLET | Refills: 2 | Status: SHIPPED | OUTPATIENT
Start: 2020-11-30 | End: 2021-01-11 | Stop reason: SDUPTHER

## 2020-12-01 ENCOUNTER — HOSPITAL ENCOUNTER (OUTPATIENT)
Age: 80
Setting detail: SPECIMEN
Discharge: HOME | End: 2020-12-01
Payer: MEDICARE

## 2020-12-01 ENCOUNTER — TELEPHONE (OUTPATIENT)
Dept: FAMILY MEDICINE CLINIC | Age: 80
End: 2020-12-01

## 2020-12-01 LAB
C DIFF AG + TOXIN: NEGATIVE
DATE, STOOL #1: 1130
DATE, STOOL #2: ABNORMAL
DATE, STOOL #3: ABNORMAL
HEMOCCULT SP1 STL QL: POSITIVE
HEMOCCULT SP2 STL QL: ABNORMAL
HEMOCCULT SP3 STL QL: ABNORMAL
SPECIMEN DESCRIPTION: NORMAL
TIME, STOOL #1: 800
TIME, STOOL #2: ABNORMAL
TIME, STOOL #3: ABNORMAL

## 2020-12-01 PROCEDURE — 87328 CRYPTOSPORIDIUM AG IA: CPT

## 2020-12-01 PROCEDURE — 87329 GIARDIA AG IA: CPT

## 2020-12-01 PROCEDURE — 87449 NOS EACH ORGANISM AG IA: CPT

## 2020-12-01 PROCEDURE — 87324 CLOSTRIDIUM AG IA: CPT

## 2020-12-01 PROCEDURE — G0328 FECAL BLOOD SCRN IMMUNOASSAY: HCPCS

## 2020-12-01 PROCEDURE — 87506 IADNA-DNA/RNA PROBE TQ 6-11: CPT

## 2020-12-02 LAB
CAMPYLOBACTER PCR: NORMAL
DIRECT EXAM: NORMAL
DIRECT EXAM: NORMAL
E COLI ENTEROTOXIGENIC PCR: NORMAL
Lab: NORMAL
PLESIOMONAS SHIGELLOIDES PCR: NORMAL
SALMONELLA PCR: NORMAL
SHIGATOXIN GENE PCR: NORMAL
SHIGELLA SP PCR: NORMAL
SPECIMEN DESCRIPTION: NORMAL
SPECIMEN DESCRIPTION: NORMAL
VIBRIO PCR: NORMAL
YERSINIA ENTEROCOLITICA PCR: NORMAL

## 2020-12-03 ENCOUNTER — TELEPHONE (OUTPATIENT)
Dept: FAMILY MEDICINE CLINIC | Age: 80
End: 2020-12-03

## 2020-12-22 ENCOUNTER — OFFICE VISIT (OUTPATIENT)
Dept: FAMILY MEDICINE CLINIC | Age: 80
End: 2020-12-22
Payer: MEDICARE

## 2020-12-22 VITALS
SYSTOLIC BLOOD PRESSURE: 132 MMHG | HEIGHT: 72 IN | HEART RATE: 78 BPM | WEIGHT: 197 LBS | BODY MASS INDEX: 26.68 KG/M2 | DIASTOLIC BLOOD PRESSURE: 78 MMHG | OXYGEN SATURATION: 98 %

## 2020-12-22 PROCEDURE — 1123F ACP DISCUSS/DSCN MKR DOCD: CPT | Performed by: NURSE PRACTITIONER

## 2020-12-22 PROCEDURE — 4040F PNEUMOC VAC/ADMIN/RCVD: CPT | Performed by: NURSE PRACTITIONER

## 2020-12-22 PROCEDURE — G8427 DOCREV CUR MEDS BY ELIG CLIN: HCPCS | Performed by: NURSE PRACTITIONER

## 2020-12-22 PROCEDURE — 99211 OFF/OP EST MAY X REQ PHY/QHP: CPT | Performed by: NURSE PRACTITIONER

## 2020-12-22 PROCEDURE — G8482 FLU IMMUNIZE ORDER/ADMIN: HCPCS | Performed by: NURSE PRACTITIONER

## 2020-12-22 PROCEDURE — G8417 CALC BMI ABV UP PARAM F/U: HCPCS | Performed by: NURSE PRACTITIONER

## 2020-12-22 PROCEDURE — 1036F TOBACCO NON-USER: CPT | Performed by: NURSE PRACTITIONER

## 2020-12-22 PROCEDURE — 99214 OFFICE O/P EST MOD 30 MIN: CPT | Performed by: NURSE PRACTITIONER

## 2020-12-22 RX ORDER — AMLODIPINE BESYLATE 10 MG/1
10 TABLET ORAL DAILY
Qty: 90 TABLET | Refills: 3 | Status: SHIPPED | OUTPATIENT
Start: 2020-12-22 | End: 2021-02-23 | Stop reason: SDUPTHER

## 2020-12-22 ASSESSMENT — ENCOUNTER SYMPTOMS
BLOOD IN STOOL: 0
SHORTNESS OF BREATH: 0
COUGH: 0
DIARRHEA: 0
ANAL BLEEDING: 0
ABDOMINAL PAIN: 0

## 2020-12-22 ASSESSMENT — PATIENT HEALTH QUESTIONNAIRE - PHQ9
SUM OF ALL RESPONSES TO PHQ9 QUESTIONS 1 & 2: 0
1. LITTLE INTEREST OR PLEASURE IN DOING THINGS: 0
SUM OF ALL RESPONSES TO PHQ QUESTIONS 1-9: 0
2. FEELING DOWN, DEPRESSED OR HOPELESS: 0

## 2020-12-22 NOTE — PROGRESS NOTES
Name: Sofia Mcconnell  : 1940         Chief Complaint:     Chief Complaint   Patient presents with    Follow-up     4 week f/u. Felt light headed yesterday. BP was going up and down. History of Present Illness:      Sofia Mcconnell is a 78 y.o.  male who presents with Follow-up (4 week f/u. Felt light headed yesterday. BP was going up and down. )      HPI     Hypertension:   The patient has a history of hypertension. Current medication regimen includes amlodipine 5 mg and losartanhydrochlorothiazide 100-25 mg daily. He is monitoring his blood pressure at home daily, at-home readings average 140150s/80s. He admits following a low-sodium diet. He admits a well-balanced diet. He is walking 1/2 mile daily. He denies chest pain, shortness of breath, headache, vision changes, or dizziness. He admits occasional lightheadedness when he bends over and stands back up. He has increased his water intake recently, stating he drinks 1 quart of water per day. Abdominal pain:  The patient states that his abdominal pain has improved. He denies cramping as he was having previously. He admits one episode of diarrhea after eating a salad but denies other loose stools. He admits 1 normal consistency bowel movement once daily, occasionally every other day. He denies blood in the stool. He denies painful stools. He denies blood on the toilet paper. He admits a colonoscopy 10 years ago that was WNL.     Past Medical History:     Past Medical History:   Diagnosis Date    Hyperlipidemia     Hypertension     Skin cancer       Reviewed all health maintenance requirements and ordered appropriate tests  Health Maintenance Due   Topic Date Due    Pneumococcal 65+ years Vaccine (1 of  - PPSV23) 2003    Annual Wellness Visit (AWV)  2020       Past Surgical History:     Past Surgical History:   Procedure Laterality Date    CATARACT REMOVAL WITH IMPLANT Right 2018    per Dr. Mcdaniels Hence  CATARACT REMOVAL WITH IMPLANT Left 04/02/2018    COLONOSCOPY      [place] XCAPSL CTRC RMVL INSJ IO LENS PROSTH W/O ECP Right 3/5/2018    EYE CATARACT EMULSIFICATION IOL IMPLANT performed by Eliane Hinojosa DO at [address] RM[facility] INSJ IO LENS PROSTH W/O ECP Left 4/2/2018    EYE CATARACT EMULSIFICATION IOL IMPLANT performed by Eliane Hinojosa DO at [address]        Medications:       Prior to Admission medications    Medication Sig Start Date End Date Taking? Authorizing Provider   amLODIPine (NORVASC) 10 MG tablet Take 1 tablet by mouth daily 12/22/20  Yes GOLD Dolan CNP   atorvastatin (LIPITOR) 10 MG tablet Take 1 tablet by mouth daily 11/30/20  Yes GOLD Dolan CNP   vitamin D3 (CHOLECALCIFEROL) 10 MCG (400 UNIT) TABS tablet Take 400 Units by mouth daily   Yes Historical Provider, MD   losartan-hydrochlorothiazide (HYZAAR) 100-25 MG per tablet Take 1 tablet by mouth daily   Yes Historical Provider, MD   loratadine (CLARITIN) 10 MG capsule Take 10 mg by mouth daily   Yes Historical Provider, MD   aspirin 81 MG tablet Take 81 mg by mouth daily   Yes Historical Provider, MD   potassium chloride (KLOR-CON M) 20 MEQ extended release tablet Take 1 tablet by mouth 2 times daily  Patient not taking: Reported on 11/25/2020 2/20/20   Kade Almodovar MD        Allergies:       Terramycin [oxytetracycline] and Lactose    Social History:     Tobacco:    reports that he quit smoking about 32 years ago. He has never used smokeless tobacco.  Alcohol:      reports current alcohol use. Drug Use:  reports no history of drug use. Family History:     No family history on file. Review of Systems:     Positive and Negative as described in HPI    Review of Systems   Eyes: Negative for visual disturbance. Respiratory: Negative for cough and shortness of breath. Cardiovascular: Negative for chest pain. Gastrointestinal: Negative for abdominal pain, anal bleeding, blood in stool and diarrhea. Neurological: Positive for light-headedness. Negative for dizziness and headaches. Physical Exam:   Vitals:  /78   Pulse 78   Ht 6' (1.829 m)   Wt 197 lb (89.4 kg)   SpO2 98%   BMI 26.72 kg/m²     Physical Exam  Constitutional:       General: He is not in acute distress. Appearance: Normal appearance. He is normal weight. He is not ill-appearing or toxic-appearing. HENT:      Head: Normocephalic. Cardiovascular:      Rate and Rhythm: Normal rate and regular rhythm. Heart sounds: Normal heart sounds. No murmur. Pulmonary:      Effort: Pulmonary effort is normal. No respiratory distress. Breath sounds: Normal breath sounds. No stridor. No wheezing, rhonchi or rales. Skin:     General: Skin is warm. Comments: Right great toenail curvature present   Neurological:      Mental Status: He is alert and oriented to person, place, and time. Psychiatric:         Mood and Affect: Mood normal.         Behavior: Behavior normal.         Thought Content:  Thought content normal.         Judgment: Judgment normal.         Data:     Lab Results   Component Value Date     11/30/2020    K 4.2 11/30/2020     11/30/2020    CO2 24 11/30/2020    BUN 26 11/30/2020    CREATININE 1.47 11/30/2020    GLUCOSE 103 11/30/2020    PROT 7.5 02/11/2020    LABALBU 5.0 02/11/2020    BILITOT 0.45 02/11/2020    ALKPHOS 63 02/11/2020    AST 16 11/30/2020    ALT 17 11/30/2020     Lab Results   Component Value Date    WBC 10.3 11/30/2020    RBC 5.03 11/30/2020    HGB 15.7 11/30/2020    HCT 45.0 11/30/2020    MCV 89.5 11/30/2020    MCH 31.2 11/30/2020    MCHC 34.9 11/30/2020    RDW 12.8 11/30/2020     11/30/2020    MPV 10.3 11/30/2020     No results found for: TSH  Lab Results   Component Value Date    CHOL 175 11/30/2020    HDL 30 11/30/2020    LABA1C 5.3 11/30/2020       Assessment/Plan: Diagnosis Orders   1. Essential hypertension     2. Toenail deformity  Rupinder Pemberton DPM, Podiatry, [place]   3. Positive occult stool blood test  [facility], [facility], General Surgery, [place]   4. Chronic diarrhea  [facility], [place]       Hypertension:  Based on at-home blood pressure readings, will increase amlodipine from 5mg to 10 mg.  Continue monitoring blood pressure 3 times weekly. Increased water intake, decrease salt intake,  Reviewed signs and symptoms of hypertension and hypotension and when to notify the office and present to the ED. BP follow-up 8 weeks. Toenail Deformity:  Patient requests referral to podiatry, specifically Dr. Dominique Kimble. Referral placed. Abdominal Pain/Chronic Diarrhea:  Symptoms improving, per patient. Based on chronic diarrhea, history of stool urgency, history of abdominal pain, and positive occult blood 12/1/2020, the patient and I discussed the need for a potential colonoscopy. The patient notes hesitancy to this. He is agreeable for consultation with Dr. Reymundo severino regarding his symptoms as he saw her earlier this year for enteritis and small bowel obstruction. Referral placed. Wellness:   Reviewed labs with patient including decreased kidney function and elevated triglycerides from 11/2020. Recommended well-balanced diet monitoring saturated fats. Recommended increased water intake, decrease salt intake, and decrease NSAID use. Will repeat labs during his AWV next year.     Completed Refills   Requested Prescriptions     Signed Prescriptions Disp Refills    amLODIPine (NORVASC) 10 MG tablet 90 tablet 3     Sig: Take 1 tablet by mouth daily       Orders Placed This Encounter   Procedures    AFL - Perfecto Backbone, DPM, Podiatry, [place]     Referral Priority:   Routine     Referral Type:   Eval and Treat     Referral Reason:   Specialty Services Required     Referred to Provider:   Praful Fenton DPM Requested Specialty:   Podiatry     Number of Visits Requested:   [address] DO, General Surgery, [place]     Referral Priority:   Routine     Referral Type:   Consult for Advice and Opinion     Referral Reason:   Specialty Services Required     Referred to Provider:   Christopher Sykes DO     Requested Specialty:   General Surgery     Number of Visits Requested:   1        No results found for this visit on 12/22/20. Return in about 2 months (around 2/22/2021), or if symptoms worsen or fail to improve, for HTN f/u.     Electronically signed by GOLD Avila CNP on 12/22/20 at 11:24 AM.

## 2021-01-11 RX ORDER — ATORVASTATIN CALCIUM 10 MG/1
10 TABLET, FILM COATED ORAL DAILY
Qty: 90 TABLET | Refills: 3 | Status: SHIPPED | OUTPATIENT
Start: 2021-01-11 | End: 2021-12-07 | Stop reason: SDUPTHER

## 2021-01-20 ENCOUNTER — INITIAL CONSULT (OUTPATIENT)
Dept: SURGERY | Age: 81
End: 2021-01-20
Payer: MEDICARE

## 2021-01-20 VITALS
HEART RATE: 89 BPM | WEIGHT: 197.7 LBS | TEMPERATURE: 98.4 F | HEIGHT: 72 IN | DIASTOLIC BLOOD PRESSURE: 84 MMHG | SYSTOLIC BLOOD PRESSURE: 155 MMHG | BODY MASS INDEX: 26.78 KG/M2

## 2021-01-20 DIAGNOSIS — Z12.11 ENCOUNTER FOR SCREENING COLONOSCOPY: ICD-10-CM

## 2021-01-20 PROCEDURE — 4040F PNEUMOC VAC/ADMIN/RCVD: CPT | Performed by: SURGERY

## 2021-01-20 PROCEDURE — 99214 OFFICE O/P EST MOD 30 MIN: CPT | Performed by: SURGERY

## 2021-01-20 PROCEDURE — 99211 OFF/OP EST MAY X REQ PHY/QHP: CPT | Performed by: SURGERY

## 2021-01-20 PROCEDURE — G8427 DOCREV CUR MEDS BY ELIG CLIN: HCPCS | Performed by: SURGERY

## 2021-01-20 PROCEDURE — G8482 FLU IMMUNIZE ORDER/ADMIN: HCPCS | Performed by: SURGERY

## 2021-01-20 PROCEDURE — 1036F TOBACCO NON-USER: CPT | Performed by: SURGERY

## 2021-01-20 PROCEDURE — 1123F ACP DISCUSS/DSCN MKR DOCD: CPT | Performed by: SURGERY

## 2021-01-20 PROCEDURE — G8417 CALC BMI ABV UP PARAM F/U: HCPCS | Performed by: SURGERY

## 2021-02-23 ENCOUNTER — OFFICE VISIT (OUTPATIENT)
Dept: FAMILY MEDICINE CLINIC | Age: 81
End: 2021-02-23
Payer: MEDICARE

## 2021-02-23 VITALS
SYSTOLIC BLOOD PRESSURE: 115 MMHG | DIASTOLIC BLOOD PRESSURE: 85 MMHG | WEIGHT: 198 LBS | OXYGEN SATURATION: 97 % | HEIGHT: 72 IN | HEART RATE: 78 BPM | BODY MASS INDEX: 26.82 KG/M2

## 2021-02-23 DIAGNOSIS — R73.09 ELEVATED GLUCOSE: ICD-10-CM

## 2021-02-23 DIAGNOSIS — E78.49 OTHER HYPERLIPIDEMIA: ICD-10-CM

## 2021-02-23 DIAGNOSIS — R19.5 POSITIVE OCCULT STOOL BLOOD TEST: ICD-10-CM

## 2021-02-23 DIAGNOSIS — I10 ESSENTIAL HYPERTENSION: Primary | ICD-10-CM

## 2021-02-23 PROCEDURE — 99213 OFFICE O/P EST LOW 20 MIN: CPT | Performed by: NURSE PRACTITIONER

## 2021-02-23 PROCEDURE — G8427 DOCREV CUR MEDS BY ELIG CLIN: HCPCS | Performed by: NURSE PRACTITIONER

## 2021-02-23 PROCEDURE — 1123F ACP DISCUSS/DSCN MKR DOCD: CPT | Performed by: NURSE PRACTITIONER

## 2021-02-23 PROCEDURE — G8417 CALC BMI ABV UP PARAM F/U: HCPCS | Performed by: NURSE PRACTITIONER

## 2021-02-23 PROCEDURE — 99211 OFF/OP EST MAY X REQ PHY/QHP: CPT

## 2021-02-23 PROCEDURE — 1036F TOBACCO NON-USER: CPT | Performed by: NURSE PRACTITIONER

## 2021-02-23 PROCEDURE — 4040F PNEUMOC VAC/ADMIN/RCVD: CPT | Performed by: NURSE PRACTITIONER

## 2021-02-23 PROCEDURE — G8482 FLU IMMUNIZE ORDER/ADMIN: HCPCS | Performed by: NURSE PRACTITIONER

## 2021-02-23 RX ORDER — AMLODIPINE BESYLATE 10 MG/1
10 TABLET ORAL DAILY
Qty: 90 TABLET | Refills: 3 | Status: SHIPPED | OUTPATIENT
Start: 2021-02-23 | End: 2022-01-10

## 2021-02-23 ASSESSMENT — PATIENT HEALTH QUESTIONNAIRE - PHQ9
1. LITTLE INTEREST OR PLEASURE IN DOING THINGS: 0
SUM OF ALL RESPONSES TO PHQ9 QUESTIONS 1 & 2: 0

## 2021-02-23 ASSESSMENT — ENCOUNTER SYMPTOMS: SHORTNESS OF BREATH: 0

## 2021-02-23 NOTE — PATIENT INSTRUCTIONS
SURVEY:    You may be receiving a survey from [employer] regarding your visit today. Please complete the survey to enable us to provide the highest quality of care to you and your family. If you cannot score us a very good on any question, please call the office to discuss how we could have made your experience a very good one. Thank you.

## 2021-02-23 NOTE — PROGRESS NOTES
Name: Rebecca Flannery  : 1940         Chief Complaint:     Chief Complaint   Patient presents with    Follow-up     Patient comes in for BP at home. States his average is around 135/ 77.  Hypertension     States he had a bloody nose the other night. No headaches. History of Present Illness:      Rebecca Flannery is a 78 y.o.  male who presents with Follow-up (Patient comes in for BP at home. States his average is around 135/ 77. ) and Hypertension (States he had a bloody nose the other night. No headaches. )      HPI     Hypertension:  Current medication regimen includes amlodipine 10mg and losartan-hydrochlorothiazide 100-25mg daily. He admits to monitoring his blood pressure at home. At-home blood pressure is averaging 135/77. He admits a well balanced diet. He admits following a low-sodium diet. For physical activity, he notes walking up the steps, mowing the grass, and walking 1/2 mile daily. He denies chest pain, shortness of breath, headache, vision changes, palpitations, or dizziness. Admits 1 episode of epistaxis. Admits occasional lightheadedness when he bends over and stands up that is resolved in seconds. He has been increasing his water intake.     Past Medical History:     Past Medical History:   Diagnosis Date    Hyperlipidemia     Hypertension     Skin cancer       Reviewed all health maintenance requirements and ordered appropriate tests  Health Maintenance Due   Topic Date Due    Pneumococcal 65+ years Vaccine (1 of 1 - PPSV23) 2003    Annual Wellness Visit (AWV)  2020    COVID-19 Vaccine (2 of 2 - Pfizer series) 2021       Past Surgical History:     Past Surgical History:   Procedure Laterality Date    CATARACT REMOVAL WITH IMPLANT Right 2018    per Dr. Cale Otoole Left 2018    COLONOSCOPY      over 15 yrs ago    OK XCAPSL CTRC RMVL INSJ IO LENS PROSTH W/O ECP Right 2018 EYE CATARACT EMULSIFICATION IOL IMPLANT performed by Kirt Will DO at [address] [facility] W/O ECP Left 04/02/2018    EYE CATARACT EMULSIFICATION IOL IMPLANT performed by Kirt Will DO at [address]        Medications:       Prior to Admission medications    Medication Sig Start Date End Date Taking? Authorizing Provider   amLODIPine (NORVASC) 10 MG tablet Take 1 tablet by mouth daily 2/23/21  Yes GOLD Thomas CNP   atorvastatin (LIPITOR) 10 MG tablet Take 1 tablet by mouth daily 1/11/21  Yes GOLD Thomas CNP   vitamin D3 (CHOLECALCIFEROL) 10 MCG (400 UNIT) TABS tablet Take 400 Units by mouth daily   Yes Historical Provider, MD   losartan-hydrochlorothiazide (HYZAAR) 100-25 MG per tablet Take 1 tablet by mouth daily   Yes Historical Provider, MD   loratadine (CLARITIN) 10 MG capsule Take 10 mg by mouth daily   Yes Historical Provider, MD   aspirin 81 MG tablet Take 81 mg by mouth daily   Yes Historical Provider, MD        Allergies:       Terramycin [oxytetracycline] and Lactose    Social History:     Tobacco:    reports that he quit smoking about 33 years ago. He has never used smokeless tobacco.  Alcohol:      reports current alcohol use. Drug Use:  reports no history of drug use. Family History:     No family history on file. Review of Systems:     Positive and Negative as described in HPI    Review of Systems   HENT: Positive for nosebleeds. Eyes: Negative for visual disturbance. Respiratory: Negative for shortness of breath. Cardiovascular: Negative for chest pain and palpitations. Neurological: Positive for light-headedness. Negative for dizziness and headaches. Physical Exam:   Vitals:  /85   Pulse 78   Ht 6' (1.829 m)   Wt 198 lb (89.8 kg)   SpO2 97%   BMI 26.85 kg/m²     Physical Exam  Constitutional:       General: He is not in acute distress. Appearance: Normal appearance. He is normal weight. He is not ill-appearing or toxic-appearing. HENT:      Head: Normocephalic. Nose:      Comments: Scant blood present anterior right nare  Cardiovascular:      Rate and Rhythm: Normal rate and regular rhythm. Heart sounds: Normal heart sounds. No murmur. Pulmonary:      Effort: Pulmonary effort is normal. No respiratory distress. Breath sounds: Normal breath sounds. No stridor. No wheezing, rhonchi or rales. Abdominal:      General: Abdomen is flat. Bowel sounds are normal. There is no distension. Palpations: Abdomen is soft. There is no mass. Tenderness: There is no abdominal tenderness. There is no guarding. Hernia: No hernia is present. Neurological:      Mental Status: He is alert and oriented to person, place, and time. Psychiatric:         Mood and Affect: Mood normal.         Behavior: Behavior normal.         Thought Content: Thought content normal.         Data:     Lab Results   Component Value Date     11/30/2020    K 4.2 11/30/2020     11/30/2020    CO2 24 11/30/2020    BUN 26 11/30/2020    CREATININE 1.47 11/30/2020    GLUCOSE 103 11/30/2020    PROT 7.5 02/11/2020    LABALBU 5.0 02/11/2020    BILITOT 0.45 02/11/2020    ALKPHOS 63 02/11/2020    AST 16 11/30/2020    ALT 17 11/30/2020     Lab Results   Component Value Date    WBC 10.3 11/30/2020    RBC 5.03 11/30/2020    HGB 15.7 11/30/2020    HCT 45.0 11/30/2020    MCV 89.5 11/30/2020    MCH 31.2 11/30/2020    MCHC 34.9 11/30/2020    RDW 12.8 11/30/2020     11/30/2020    MPV 10.3 11/30/2020     No results found for: TSH  Lab Results   Component Value Date    CHOL 175 11/30/2020    HDL 30 11/30/2020    LABA1C 5.3 11/30/2020       Assessment/Plan:      Diagnosis Orders   1. Essential hypertension  ALT    AST    Basic Metabolic Panel    CBC   2. Elevated glucose  Hemoglobin A1C   3.  Other hyperlipidemia  Lipid Panel 4. Positive occult stool blood test         Hypertension:   Reviewed at-home blood pressure readings. I am pleased with these. Continue current medications as prescribed. Reviewed lifestyle modifications to lower blood pressure including well-balanced diet, physical activity, decreased salt intake, stress relief, and decreased caffeine and alcohol intake. For epistaxis, recommended nasal saline and humidifier use. He has a [place] pot at home and we discussed ways to use this safely. If epistaxis worsens or persists, he is to notify office. Wellness:  Patient was noted to have elevated triglycerides 11/2020 and was initiated on atorvastatin 10 mg. Will recheck lipid panel prior to upcoming wellness appointment. Patient is following with Dr. Angie Finney (general surgery) for rectal bleeding and prior abdominal cramping who recommended colonoscopy. Patient states that he is still thinking about this and will call if he is agreeable. Completed Refills   Requested Prescriptions     Signed Prescriptions Disp Refills    amLODIPine (NORVASC) 10 MG tablet 90 tablet 3     Sig: Take 1 tablet by mouth daily       Orders Placed This Encounter   Procedures    ALT     Standing Status:   Future     Standing Expiration Date:   2/23/2022    AST     Standing Status:   Future     Standing Expiration Date:   2/23/2022    Basic Metabolic Panel     Standing Status:   Future     Standing Expiration Date:   2/23/2022    CBC     Standing Status:   Future     Standing Expiration Date:   2/23/2022    Hemoglobin A1C     Standing Status:   Future     Standing Expiration Date:   2/23/2022    Lipid Panel     Standing Status:   Future     Standing Expiration Date:   2/23/2022     Order Specific Question:   Is Patient Fasting?/# of Hours     Answer:   fasting        No results found for this visit on 02/23/21. Return in about 3 months (around 5/23/2021), or if symptoms worsen or fail to improve, for AWV. Electronically signed by GOLD Jiménez CNP on 02/23/21 at 10:16 AM.

## 2021-04-06 ENCOUNTER — PATIENT MESSAGE (OUTPATIENT)
Dept: FAMILY MEDICINE CLINIC | Age: 81
End: 2021-04-06

## 2021-04-06 RX ORDER — LOSARTAN POTASSIUM AND HYDROCHLOROTHIAZIDE 25; 100 MG/1; MG/1
1 TABLET ORAL DAILY
Qty: 90 TABLET | Refills: 1 | Status: SHIPPED | OUTPATIENT
Start: 2021-04-06 | End: 2021-09-02

## 2021-04-06 NOTE — TELEPHONE ENCOUNTER
Health Maintenance   Topic Date Due    Pneumococcal 65+ years Vaccine (1 of 1 - PPSV23) Never done    Annual Wellness Visit (AWV)  Never done    COVID-19 Vaccine (2 - Pfizer 2-dose series) 02/23/2021    DTaP/Tdap/Td vaccine (1 - Tdap) 11/25/2021 (Originally 9/9/1959)    Shingles Vaccine (1 of 2) 11/25/2021 (Originally 9/9/1988)    Lipid screen  11/30/2021    Potassium monitoring  11/30/2021    Creatinine monitoring  11/30/2021    Flu vaccine  Completed    Hepatitis A vaccine  Aged Out    Hepatitis B vaccine  Aged Out    Hib vaccine  Aged Out    Meningococcal (ACWY) vaccine  Aged Out             (applicable per patient's age: Cancer Screenings, Depression Screening, Fall Risk Screening, Immunizations)    Hemoglobin A1C (%)   Date Value   11/30/2020 5.3     LDL Cholesterol (mg/dL)   Date Value   11/30/2020 69     AST (U/L)   Date Value   11/30/2020 16     ALT (U/L)   Date Value   11/30/2020 17     BUN (mg/dL)   Date Value   11/30/2020 26 (H)      (goal A1C is < 7)   (goal LDL is <100) need 30-50% reduction from baseline     BP Readings from Last 3 Encounters:   02/23/21 115/85   01/20/21 (!) 155/84   12/22/20 132/78    (goal /80)      All Future Testing planned in [facility]:  Lab Frequency Next Occurrence   Culture, Aerobic Once 11/30/2020   ALT Once 05/23/2021   AST Once 98/21/6230   Basic Metabolic Panel Once 75/21/6352   CBC Once 05/23/2021   Hemoglobin A1C Once 05/23/2021   Lipid Panel Once 05/23/2021       Next Visit Date:  Future Appointments   Date Time Provider Caroline Davidson   6/7/2021  2:00 PM GOLD Jones - CNP [facility]            Patient Active Problem List:     Colitis     SBO (small bowel obstruction) ([facility] [address]     Chronic diarrhea     Essential hypertension     Other hyperlipidemia

## 2021-04-06 NOTE — TELEPHONE ENCOUNTER
From: Bucky Lopez  To:  Francis Ackerman, APRN - CNP  Sent: 4/6/2021 2:35 PM EDT  Subject: Prescription Question    Please order losartan/hctz 100-25mg tablet and order from [facility] mail order 90 supply    thank you    Harmony Rothman

## 2021-05-18 ENCOUNTER — HOSPITAL ENCOUNTER (OUTPATIENT)
Age: 81
Discharge: HOME | End: 2021-05-18
Payer: MEDICARE

## 2021-05-18 DIAGNOSIS — R73.09 ELEVATED GLUCOSE: ICD-10-CM

## 2021-05-18 DIAGNOSIS — E78.49 OTHER HYPERLIPIDEMIA: ICD-10-CM

## 2021-05-18 DIAGNOSIS — I10 ESSENTIAL HYPERTENSION: ICD-10-CM

## 2021-05-18 LAB
ALT SERPL-CCNC: 19 U/L (ref 5–41)
ANION GAP SERPL CALCULATED.3IONS-SCNC: 9 MMOL/L (ref 9–17)
AST SERPL-CCNC: 17 U/L
BUN BLDV-MCNC: 22 MG/DL (ref 8–23)
BUN/CREAT BLD: 16 (ref 9–20)
CALCIUM SERPL-MCNC: 9.4 MG/DL (ref 8.6–10.4)
CHLORIDE BLD-SCNC: 107 MMOL/L (ref 98–107)
CHOLESTEROL/HDL RATIO: 4.3
CHOLESTEROL: 132 MG/DL
CO2: 26 MMOL/L (ref 20–31)
CREAT SERPL-MCNC: 1.38 MG/DL (ref 0.7–1.2)
ESTIMATED AVERAGE GLUCOSE: 103 MG/DL
GFR AFRICAN AMERICAN: 60 ML/MIN
GFR NON-AFRICAN AMERICAN: 49 ML/MIN
GFR SERPL CREATININE-BSD FRML MDRD: ABNORMAL ML/MIN/{1.73_M2}
GFR SERPL CREATININE-BSD FRML MDRD: ABNORMAL ML/MIN/{1.73_M2}
GLUCOSE BLD-MCNC: 104 MG/DL (ref 70–99)
HBA1C MFR BLD: 5.2 % (ref 4–6)
HCT VFR BLD CALC: 40.2 % (ref 40.7–50.3)
HDLC SERPL-MCNC: 31 MG/DL
HEMOGLOBIN: 14.2 G/DL (ref 13–17)
LDL CHOLESTEROL: 62 MG/DL (ref 0–130)
MCH RBC QN AUTO: 31.3 PG (ref 25.2–33.5)
MCHC RBC AUTO-ENTMCNC: 35.3 G/DL (ref 28.4–34.8)
MCV RBC AUTO: 88.7 FL (ref 82.6–102.9)
NRBC AUTOMATED: 0 PER 100 WBC
PDW BLD-RTO: 12.5 % (ref 11.8–14.4)
PLATELET # BLD: ABNORMAL K/UL (ref 138–453)
PLATELET, FLUORESCENCE: 143 K/UL (ref 138–453)
PLATELET, IMMATURE FRACTION: 2.7 % (ref 1.1–10.3)
PMV BLD AUTO: ABNORMAL FL (ref 8.1–13.5)
POTASSIUM SERPL-SCNC: 4 MMOL/L (ref 3.7–5.3)
RBC # BLD: 4.53 M/UL (ref 4.21–5.77)
SODIUM BLD-SCNC: 142 MMOL/L (ref 135–144)
TRIGL SERPL-MCNC: 195 MG/DL
VLDLC SERPL CALC-MCNC: ABNORMAL MG/DL (ref 1–30)
WBC # BLD: 6.5 K/UL (ref 3.5–11.3)

## 2021-05-18 PROCEDURE — 85055 RETICULATED PLATELET ASSAY: CPT

## 2021-05-18 PROCEDURE — 85027 COMPLETE CBC AUTOMATED: CPT

## 2021-05-18 PROCEDURE — 80048 BASIC METABOLIC PNL TOTAL CA: CPT

## 2021-05-18 PROCEDURE — 36415 COLL VENOUS BLD VENIPUNCTURE: CPT

## 2021-05-18 PROCEDURE — 84460 ALANINE AMINO (ALT) (SGPT): CPT

## 2021-05-18 PROCEDURE — 84450 TRANSFERASE (AST) (SGOT): CPT

## 2021-05-18 PROCEDURE — 83036 HEMOGLOBIN GLYCOSYLATED A1C: CPT

## 2021-05-18 PROCEDURE — 80061 LIPID PANEL: CPT

## 2021-06-07 ENCOUNTER — OFFICE VISIT (OUTPATIENT)
Dept: FAMILY MEDICINE CLINIC | Age: 81
End: 2021-06-07
Payer: MEDICARE

## 2021-06-07 VITALS
DIASTOLIC BLOOD PRESSURE: 80 MMHG | SYSTOLIC BLOOD PRESSURE: 130 MMHG | OXYGEN SATURATION: 98 % | BODY MASS INDEX: 25.73 KG/M2 | WEIGHT: 190 LBS | HEIGHT: 72 IN | HEART RATE: 68 BPM

## 2021-06-07 DIAGNOSIS — E78.49 OTHER HYPERLIPIDEMIA: ICD-10-CM

## 2021-06-07 DIAGNOSIS — R73.09 ELEVATED GLUCOSE: ICD-10-CM

## 2021-06-07 DIAGNOSIS — I10 ESSENTIAL HYPERTENSION: Primary | ICD-10-CM

## 2021-06-07 DIAGNOSIS — N18.31 STAGE 3A CHRONIC KIDNEY DISEASE (HCC): ICD-10-CM

## 2021-06-07 PROBLEM — D68.9 COAGULATION DEFECT (HCC): Status: ACTIVE | Noted: 2021-06-07

## 2021-06-07 PROCEDURE — G0439 PPPS, SUBSEQ VISIT: HCPCS | Performed by: NURSE PRACTITIONER

## 2021-06-07 PROCEDURE — 4040F PNEUMOC VAC/ADMIN/RCVD: CPT | Performed by: NURSE PRACTITIONER

## 2021-06-07 PROCEDURE — 1123F ACP DISCUSS/DSCN MKR DOCD: CPT | Performed by: NURSE PRACTITIONER

## 2021-06-07 PROCEDURE — 99211 OFF/OP EST MAY X REQ PHY/QHP: CPT | Performed by: NURSE PRACTITIONER

## 2021-06-07 SDOH — ECONOMIC STABILITY: FOOD INSECURITY: WITHIN THE PAST 12 MONTHS, THE FOOD YOU BOUGHT JUST DIDN'T LAST AND YOU DIDN'T HAVE MONEY TO GET MORE.: NEVER TRUE

## 2021-06-07 SDOH — ECONOMIC STABILITY: FOOD INSECURITY: WITHIN THE PAST 12 MONTHS, YOU WORRIED THAT YOUR FOOD WOULD RUN OUT BEFORE YOU GOT MONEY TO BUY MORE.: NEVER TRUE

## 2021-06-07 ASSESSMENT — ENCOUNTER SYMPTOMS
SORE THROAT: 0
COUGH: 0
SHORTNESS OF BREATH: 0
ABDOMINAL PAIN: 0
RHINORRHEA: 1
SINUS PRESSURE: 0
SINUS PAIN: 0
DIARRHEA: 0
CONSTIPATION: 0
WHEEZING: 0

## 2021-06-07 ASSESSMENT — PATIENT HEALTH QUESTIONNAIRE - PHQ9
SUM OF ALL RESPONSES TO PHQ9 QUESTIONS 1 & 2: 0
1. LITTLE INTEREST OR PLEASURE IN DOING THINGS: 0
SUM OF ALL RESPONSES TO PHQ QUESTIONS 1-9: 0
2. FEELING DOWN, DEPRESSED OR HOPELESS: 0
SUM OF ALL RESPONSES TO PHQ QUESTIONS 1-9: 0
SUM OF ALL RESPONSES TO PHQ QUESTIONS 1-9: 0

## 2021-06-07 ASSESSMENT — SOCIAL DETERMINANTS OF HEALTH (SDOH): HOW HARD IS IT FOR YOU TO PAY FOR THE VERY BASICS LIKE FOOD, HOUSING, MEDICAL CARE, AND HEATING?: NOT HARD AT ALL

## 2021-06-07 NOTE — PATIENT INSTRUCTIONS
Request records for pneumonia vaccine    SURVEY:    You may be receiving a survey from [employer] regarding your visit today. Please complete the survey to enable us to provide the highest quality of care to you and your family. If you cannot score us a very good on any question, please call the office to discuss how we could have made your experience a very good one. Thank you.

## 2021-09-02 RX ORDER — LOSARTAN POTASSIUM AND HYDROCHLOROTHIAZIDE 25; 100 MG/1; MG/1
TABLET ORAL
Qty: 90 TABLET | Refills: 1 | Status: SHIPPED | OUTPATIENT
Start: 2021-09-02 | End: 2022-01-31

## 2021-10-21 ENCOUNTER — NURSE ONLY (OUTPATIENT)
Dept: FAMILY MEDICINE CLINIC | Age: 81
End: 2021-10-21
Payer: MEDICARE

## 2021-10-21 DIAGNOSIS — Z23 NEED FOR IMMUNIZATION AGAINST INFLUENZA: Primary | ICD-10-CM

## 2021-10-21 PROCEDURE — 90471 IMMUNIZATION ADMIN: CPT | Performed by: NURSE PRACTITIONER

## 2021-10-21 PROCEDURE — PBSHW INFLUENZA, QUADV, ADJUVANTED, 65 YRS +, IM, PF, PREFILL SYR, 0.5ML (FLUAD): Performed by: NURSE PRACTITIONER

## 2021-12-01 ENCOUNTER — HOSPITAL ENCOUNTER (OUTPATIENT)
Age: 81
Discharge: HOME | End: 2021-12-01
Payer: MEDICARE

## 2021-12-01 DIAGNOSIS — I10 ESSENTIAL HYPERTENSION: ICD-10-CM

## 2021-12-01 DIAGNOSIS — R73.09 ELEVATED GLUCOSE: ICD-10-CM

## 2021-12-01 DIAGNOSIS — E78.49 OTHER HYPERLIPIDEMIA: ICD-10-CM

## 2021-12-01 LAB
ALT SERPL-CCNC: 28 U/L (ref 5–41)
ANION GAP SERPL CALCULATED.3IONS-SCNC: 13 MMOL/L (ref 9–17)
AST SERPL-CCNC: 20 U/L
BUN BLDV-MCNC: 27 MG/DL (ref 8–23)
BUN/CREAT BLD: 22 (ref 9–20)
CALCIUM SERPL-MCNC: 9.7 MG/DL (ref 8.6–10.4)
CHLORIDE BLD-SCNC: 104 MMOL/L (ref 98–107)
CHOLESTEROL/HDL RATIO: 4.4
CHOLESTEROL: 142 MG/DL
CO2: 25 MMOL/L (ref 20–31)
CREAT SERPL-MCNC: 1.25 MG/DL (ref 0.7–1.2)
ESTIMATED AVERAGE GLUCOSE: 100 MG/DL
GFR AFRICAN AMERICAN: >60 ML/MIN
GFR NON-AFRICAN AMERICAN: 55 ML/MIN
GFR SERPL CREATININE-BSD FRML MDRD: ABNORMAL ML/MIN/{1.73_M2}
GFR SERPL CREATININE-BSD FRML MDRD: ABNORMAL ML/MIN/{1.73_M2}
GLUCOSE BLD-MCNC: 96 MG/DL (ref 70–99)
HBA1C MFR BLD: 5.1 % (ref 4–6)
HCT VFR BLD CALC: 42.4 % (ref 40.7–50.3)
HDLC SERPL-MCNC: 32 MG/DL
HEMOGLOBIN: 14.5 G/DL (ref 13–17)
LDL CHOLESTEROL: 65 MG/DL (ref 0–130)
MCH RBC QN AUTO: 30.6 PG (ref 25.2–33.5)
MCHC RBC AUTO-ENTMCNC: 34.2 G/DL (ref 28.4–34.8)
MCV RBC AUTO: 89.5 FL (ref 82.6–102.9)
NRBC AUTOMATED: 0 PER 100 WBC
PDW BLD-RTO: 12.7 % (ref 11.8–14.4)
PLATELET # BLD: 154 K/UL (ref 138–453)
PMV BLD AUTO: 10 FL (ref 8.1–13.5)
POTASSIUM SERPL-SCNC: 4.1 MMOL/L (ref 3.7–5.3)
RBC # BLD: 4.74 M/UL (ref 4.21–5.77)
SODIUM BLD-SCNC: 142 MMOL/L (ref 135–144)
TRIGL SERPL-MCNC: 223 MG/DL
VLDLC SERPL CALC-MCNC: ABNORMAL MG/DL (ref 1–30)
WBC # BLD: 7.5 K/UL (ref 3.5–11.3)

## 2021-12-01 PROCEDURE — 36415 COLL VENOUS BLD VENIPUNCTURE: CPT

## 2021-12-01 PROCEDURE — 84450 TRANSFERASE (AST) (SGOT): CPT

## 2021-12-01 PROCEDURE — 80048 BASIC METABOLIC PNL TOTAL CA: CPT

## 2021-12-01 PROCEDURE — 83036 HEMOGLOBIN GLYCOSYLATED A1C: CPT

## 2021-12-01 PROCEDURE — 85027 COMPLETE CBC AUTOMATED: CPT

## 2021-12-01 PROCEDURE — 84460 ALANINE AMINO (ALT) (SGPT): CPT

## 2021-12-01 PROCEDURE — 80061 LIPID PANEL: CPT

## 2021-12-07 ENCOUNTER — OFFICE VISIT (OUTPATIENT)
Dept: FAMILY MEDICINE CLINIC | Age: 81
End: 2021-12-07
Payer: MEDICARE

## 2021-12-07 VITALS
TEMPERATURE: 96 F | HEART RATE: 87 BPM | HEIGHT: 72 IN | RESPIRATION RATE: 16 BRPM | SYSTOLIC BLOOD PRESSURE: 118 MMHG | OXYGEN SATURATION: 97 % | DIASTOLIC BLOOD PRESSURE: 80 MMHG | WEIGHT: 198 LBS | BODY MASS INDEX: 26.82 KG/M2

## 2021-12-07 DIAGNOSIS — E78.49 OTHER HYPERLIPIDEMIA: ICD-10-CM

## 2021-12-07 DIAGNOSIS — N18.31 STAGE 3A CHRONIC KIDNEY DISEASE (HCC): ICD-10-CM

## 2021-12-07 DIAGNOSIS — I10 ESSENTIAL HYPERTENSION: Primary | ICD-10-CM

## 2021-12-07 PROCEDURE — 1123F ACP DISCUSS/DSCN MKR DOCD: CPT | Performed by: NURSE PRACTITIONER

## 2021-12-07 PROCEDURE — 4040F PNEUMOC VAC/ADMIN/RCVD: CPT | Performed by: NURSE PRACTITIONER

## 2021-12-07 PROCEDURE — G8417 CALC BMI ABV UP PARAM F/U: HCPCS | Performed by: NURSE PRACTITIONER

## 2021-12-07 PROCEDURE — 99211 OFF/OP EST MAY X REQ PHY/QHP: CPT

## 2021-12-07 PROCEDURE — 1036F TOBACCO NON-USER: CPT | Performed by: NURSE PRACTITIONER

## 2021-12-07 PROCEDURE — G8484 FLU IMMUNIZE NO ADMIN: HCPCS | Performed by: NURSE PRACTITIONER

## 2021-12-07 PROCEDURE — G8427 DOCREV CUR MEDS BY ELIG CLIN: HCPCS | Performed by: NURSE PRACTITIONER

## 2021-12-07 PROCEDURE — 99214 OFFICE O/P EST MOD 30 MIN: CPT | Performed by: NURSE PRACTITIONER

## 2021-12-07 RX ORDER — ATORVASTATIN CALCIUM 10 MG/1
10 TABLET, FILM COATED ORAL DAILY
Qty: 90 TABLET | Refills: 3 | Status: SHIPPED | OUTPATIENT
Start: 2021-12-07 | End: 2022-07-15

## 2021-12-07 ASSESSMENT — ENCOUNTER SYMPTOMS
WHEEZING: 0
SINUS PAIN: 0
CONSTIPATION: 0
SINUS PRESSURE: 0
DIARRHEA: 0
SORE THROAT: 0
RHINORRHEA: 1
ABDOMINAL PAIN: 0
SHORTNESS OF BREATH: 0
COUGH: 0

## 2021-12-07 NOTE — PROGRESS NOTES
Name: Saji Shepard  : 1940         Chief Complaint:     Chief Complaint   Patient presents with    Follow-up     6 month check. denies issues. History of Present Illness:      Saji Shepard is a 78 y.o.  male who presents with Follow-up (6 month check. denies issues. )      HPI     Hypertension:   Current medication regimen includes amlodipine 10 mg and losartan-hydrochlorothiazide 100-25 mg daily.  Admits following a low-sodium diet. For physical activity, he is walking once daily, 6 out of 7 days per week. Denies vision changes, chest pain, heart palpitations, shortness of breath, lightheadedness, lower extremity swelling, or headaches.      Hyperlipidemia:   Current treatment includes atorvastatin 10mg QD. Denies side effects. He admits a well balanced diet. Past Medical History:     Past Medical History:   Diagnosis Date    Hyperlipidemia     Hypertension     Skin cancer       Reviewed all health maintenance requirements and ordered appropriate tests  There are no preventive care reminders to display for this patient. Past Surgical History:     Past Surgical History:   Procedure Laterality Date    CATARACT REMOVAL WITH IMPLANT Right 2018    per Dr. Raymond Gimenez Left 2018    COLONOSCOPY      over 15 yrs ago    ME XCAPSL CTRC RMVL INSJ IO LENS PROSTH W/O ECP Right 2018    EYE CATARACT EMULSIFICATION IOL IMPLANT performed by Adam Miller DO at [address] [facility] W/O ECP Left 2018    EYE CATARACT EMULSIFICATION IOL IMPLANT performed by Adam Miller DO at [address] [facility]        Medications:       Prior to Admission medications    Medication Sig Start Date End Date Taking?  Authorizing Provider   losartan-hydroCHLOROthiazide (HYZAAR) 100-25 MG per tablet TAKE 1 TABLET EVERY DAY 21  Yes GOLD Shipley - CNP   amLODIPine (NORVASC) 10 MG tablet Take 1 tablet by mouth daily 21  Yes Margi Clifton APRN - CNP   atorvastatin (LIPITOR) 10 MG tablet Take 1 tablet by mouth daily 1/11/21  Yes Betty Villafana, APRN - CNP   vitamin D3 (CHOLECALCIFEROL) 10 MCG (400 UNIT) TABS tablet Take 400 Units by mouth daily   Yes Historical Provider, MD   loratadine (CLARITIN) 10 MG capsule Take 10 mg by mouth daily   Yes Historical Provider, MD   aspirin 81 MG tablet Take 81 mg by mouth daily   Yes Historical Provider, MD        Allergies:       Terramycin [oxytetracycline] and Lactose    Social History:     Tobacco:    reports that he quit smoking about 33 years ago. He has never used smokeless tobacco.  Alcohol:      reports current alcohol use. Drug Use:  reports no history of drug use. Family History:     No family history on file. Review of Systems:     Positive and Negative as described in HPI    Review of Systems   Constitutional: Negative for chills, fatigue, fever and unexpected weight change. HENT: Positive for rhinorrhea. Negative for congestion, sinus pressure, sinus pain and sore throat. Eyes: Negative for visual disturbance. Respiratory: Negative for cough, shortness of breath and wheezing. Cardiovascular: Negative for chest pain and palpitations. Gastrointestinal: Negative for abdominal pain, constipation and diarrhea. Genitourinary: Positive for difficulty urinating (Admits \"slow stream\"). Musculoskeletal: Negative for arthralgias, joint swelling and myalgias. Skin: Negative for rash. Neurological: Negative for dizziness, light-headedness and headaches. Physical Exam:   Vitals:  /80   Pulse 87   Temp 96 °F (35.6 °C)   Resp 16   Ht 6' (1.829 m)   Wt 198 lb (89.8 kg)   SpO2 97%   BMI 26.85 kg/m²     Physical Exam  Constitutional:       General: He is not in acute distress. Appearance: Normal appearance. He is normal weight. He is not ill-appearing or toxic-appearing. HENT:      Head: Normocephalic.       Right Ear: Tympanic membrane and external ear normal. There is no impacted cerumen (moderate cerumen right canal. non-obstructing). Left Ear: Tympanic membrane, ear canal and external ear normal. There is no impacted cerumen. Nose: Nose normal. No congestion or rhinorrhea. Mouth/Throat:      Mouth: Mucous membranes are moist.      Pharynx: No oropharyngeal exudate or posterior oropharyngeal erythema. Cardiovascular:      Rate and Rhythm: Normal rate and regular rhythm. Heart sounds: Normal heart sounds. No murmur heard. Pulmonary:      Effort: Pulmonary effort is normal. No respiratory distress. Breath sounds: Normal breath sounds. No stridor. No wheezing, rhonchi or rales. Abdominal:      General: Abdomen is flat. There is no distension. Palpations: Abdomen is soft. There is no mass. Tenderness: There is no abdominal tenderness. There is no guarding. Hernia: No hernia is present. Comments: Bowel sounds hyperactive in all 4 quadrants   Musculoskeletal:      Cervical back: Neck supple. Lymphadenopathy:      Cervical: No cervical adenopathy. Skin:     General: Skin is warm. Neurological:      Mental Status: He is alert and oriented to person, place, and time. Psychiatric:         Mood and Affect: Mood normal.         Behavior: Behavior normal.         Thought Content:  Thought content normal.         Judgment: Judgment normal.         Data:     Lab Results   Component Value Date     12/01/2021    K 4.1 12/01/2021     12/01/2021    CO2 25 12/01/2021    BUN 27 12/01/2021    CREATININE 1.25 12/01/2021    GLUCOSE 96 12/01/2021    PROT 7.5 02/11/2020    LABALBU 5.0 02/11/2020    BILITOT 0.45 02/11/2020    ALKPHOS 63 02/11/2020    AST 20 12/01/2021    ALT 28 12/01/2021     Lab Results   Component Value Date    WBC 7.5 12/01/2021    RBC 4.74 12/01/2021    HGB 14.5 12/01/2021    HCT 42.4 12/01/2021    MCV 89.5 12/01/2021    MCH 30.6 12/01/2021    MCHC 34.2 12/01/2021    RDW 12.7 12/01/2021     12/01/2021 MPV 10.0 12/01/2021     No results found for: TSH  Lab Results   Component Value Date    CHOL 142 12/01/2021    HDL 32 12/01/2021    LABA1C 5.1 12/01/2021       Assessment/Plan:      Diagnosis Orders   1. Essential hypertension  ALT    AST    Basic Metabolic Panel    CBC   2. Other hyperlipidemia  Lipid Panel   3. Stage 3a chronic kidney disease ([facility] [address]       Hypertension:   -Stable  -Continue medications as prescribed  -Reviewed lifestyle modifications to assist with lowering blood pressure including weight loss, healthy diet, exercising routinely, low-sodium diet, limiting caffeine and alcohol, and encouraging stress relief techniques. Hyperlipidemia:   -Reviewed lipid panel from 12/2021. Total cholesterol and LDL WNL. Triglycerides elevated to 223. Continue atorvastatin 10 mg. Refill supplied today.  -Counseled on low-cholesterol diet and routine physical activity. CKD:   -Reviewed BMP from 12/2021. Creatinine and GFR are improving.  -Encouraged adequate water intake and limiting salt intake. Avoid NSAIDs. Cerumen:   -Not impacted. Irrigation not warranted at this time. Encouraged use of Debrox. Wellness:  -Reviewed labs from 12/2021. Overall, I am pleased with these.  -Offered pneumococcal vaccine today in office.   Patient declines.  -Encourage completion of tetanus vaccine at local health department    Follow-up in 6 months for annual wellness visit with labs prior    Completed Refills   Requested Prescriptions      No prescriptions requested or ordered in this encounter       Orders Placed This Encounter   Procedures    ALT     Standing Status:   Future     Standing Expiration Date:   12/7/2022    AST     Standing Status:   Future     Standing Expiration Date:   12/7/2022    Basic Metabolic Panel     Standing Status:   Future     Standing Expiration Date:   12/7/2022    CBC     Standing Status:   Future     Standing Expiration Date:   12/7/2022    Lipid Panel     Standing Status: Future     Standing Expiration Date:   12/7/2022     Order Specific Question:   Is Patient Fasting?/# of Hours     Answer:   fasting        No results found for this visit on 12/07/21. Return in about 6 months (around 6/7/2022), or if symptoms worsen or fail to improve, for AWV with labs prior.     Electronically signed by GOLD Tapia CNP on 12/07/21 at 8:37 AM.

## 2021-12-07 NOTE — PATIENT INSTRUCTIONS
SURVEY:    You may be receiving a survey from [employer] regarding your visit today. Please complete the survey to enable us to provide the highest quality of care to you and your family. If you cannot score us a very good on any question, please call the office to discuss how we could of made your experience a very good one. Thank you.

## 2022-01-10 RX ORDER — AMLODIPINE BESYLATE 10 MG/1
TABLET ORAL
Qty: 90 TABLET | Refills: 3 | Status: SHIPPED | OUTPATIENT
Start: 2022-01-10

## 2022-01-31 RX ORDER — LOSARTAN POTASSIUM AND HYDROCHLOROTHIAZIDE 25; 100 MG/1; MG/1
TABLET ORAL
Qty: 90 TABLET | Refills: 3 | Status: SHIPPED | OUTPATIENT
Start: 2022-01-31

## 2022-06-02 SDOH — HEALTH STABILITY: PHYSICAL HEALTH: ON AVERAGE, HOW MANY MINUTES DO YOU ENGAGE IN EXERCISE AT THIS LEVEL?: 60 MIN

## 2022-06-02 SDOH — HEALTH STABILITY: PHYSICAL HEALTH: ON AVERAGE, HOW MANY DAYS PER WEEK DO YOU ENGAGE IN MODERATE TO STRENUOUS EXERCISE (LIKE A BRISK WALK)?: 7 DAYS

## 2022-06-02 ASSESSMENT — PATIENT HEALTH QUESTIONNAIRE - PHQ9
1. LITTLE INTEREST OR PLEASURE IN DOING THINGS: 0
SUM OF ALL RESPONSES TO PHQ QUESTIONS 1-9: 0
SUM OF ALL RESPONSES TO PHQ QUESTIONS 1-9: 0
2. FEELING DOWN, DEPRESSED OR HOPELESS: 0
SUM OF ALL RESPONSES TO PHQ QUESTIONS 1-9: 0
SUM OF ALL RESPONSES TO PHQ9 QUESTIONS 1 & 2: 0
SUM OF ALL RESPONSES TO PHQ QUESTIONS 1-9: 0

## 2022-06-02 ASSESSMENT — LIFESTYLE VARIABLES
HOW OFTEN DO YOU HAVE A DRINK CONTAINING ALCOHOL: 2-4 TIMES A MONTH
HOW MANY STANDARD DRINKS CONTAINING ALCOHOL DO YOU HAVE ON A TYPICAL DAY: 1 OR 2
HOW OFTEN DO YOU HAVE SIX OR MORE DRINKS ON ONE OCCASION: 1
HOW MANY STANDARD DRINKS CONTAINING ALCOHOL DO YOU HAVE ON A TYPICAL DAY: 1
HOW OFTEN DO YOU HAVE A DRINK CONTAINING ALCOHOL: 3

## 2022-06-06 ENCOUNTER — HOSPITAL ENCOUNTER (OUTPATIENT)
Age: 82
Discharge: HOME | End: 2022-06-06
Payer: MEDICARE

## 2022-06-06 DIAGNOSIS — I10 ESSENTIAL HYPERTENSION: ICD-10-CM

## 2022-06-06 DIAGNOSIS — E78.49 OTHER HYPERLIPIDEMIA: ICD-10-CM

## 2022-06-06 LAB
ALT SERPL-CCNC: 18 U/L (ref 5–41)
ANION GAP SERPL CALCULATED.3IONS-SCNC: 12 MMOL/L (ref 9–17)
AST SERPL-CCNC: 16 U/L
BUN BLDV-MCNC: 22 MG/DL (ref 8–23)
BUN/CREAT BLD: 15 (ref 9–20)
CALCIUM SERPL-MCNC: 9.5 MG/DL (ref 8.6–10.4)
CHLORIDE BLD-SCNC: 104 MMOL/L (ref 98–107)
CHOLESTEROL/HDL RATIO: 3.7
CHOLESTEROL: 127 MG/DL
CO2: 25 MMOL/L (ref 20–31)
CREAT SERPL-MCNC: 1.45 MG/DL (ref 0.7–1.2)
GFR AFRICAN AMERICAN: 56 ML/MIN
GFR NON-AFRICAN AMERICAN: 46 ML/MIN
GFR SERPL CREATININE-BSD FRML MDRD: ABNORMAL ML/MIN/{1.73_M2}
GFR SERPL CREATININE-BSD FRML MDRD: ABNORMAL ML/MIN/{1.73_M2}
GLUCOSE BLD-MCNC: 99 MG/DL (ref 70–99)
HCT VFR BLD CALC: 41.6 % (ref 40.7–50.3)
HDLC SERPL-MCNC: 34 MG/DL
HEMOGLOBIN: 14.2 G/DL (ref 13–17)
LDL CHOLESTEROL: 58 MG/DL (ref 0–130)
MCH RBC QN AUTO: 30.9 PG (ref 25.2–33.5)
MCHC RBC AUTO-ENTMCNC: 34.1 G/DL (ref 28.4–34.8)
MCV RBC AUTO: 90.6 FL (ref 82.6–102.9)
NRBC AUTOMATED: 0 PER 100 WBC
PDW BLD-RTO: 12.9 % (ref 11.8–14.4)
PLATELET # BLD: 141 K/UL (ref 138–453)
PMV BLD AUTO: 10.1 FL (ref 8.1–13.5)
POTASSIUM SERPL-SCNC: 4.1 MMOL/L (ref 3.7–5.3)
RBC # BLD: 4.59 M/UL (ref 4.21–5.77)
SODIUM BLD-SCNC: 141 MMOL/L (ref 135–144)
TRIGL SERPL-MCNC: 174 MG/DL
WBC # BLD: 7.7 K/UL (ref 3.5–11.3)

## 2022-06-06 PROCEDURE — 80048 BASIC METABOLIC PNL TOTAL CA: CPT

## 2022-06-06 PROCEDURE — 84460 ALANINE AMINO (ALT) (SGPT): CPT

## 2022-06-06 PROCEDURE — 36415 COLL VENOUS BLD VENIPUNCTURE: CPT

## 2022-06-06 PROCEDURE — 84450 TRANSFERASE (AST) (SGOT): CPT

## 2022-06-06 PROCEDURE — 85027 COMPLETE CBC AUTOMATED: CPT

## 2022-06-06 PROCEDURE — 80061 LIPID PANEL: CPT

## 2022-06-08 ENCOUNTER — OFFICE VISIT (OUTPATIENT)
Dept: PRIMARY CARE CLINIC | Age: 82
End: 2022-06-08
Payer: MEDICARE

## 2022-06-08 VITALS
SYSTOLIC BLOOD PRESSURE: 112 MMHG | RESPIRATION RATE: 14 BRPM | WEIGHT: 198 LBS | OXYGEN SATURATION: 96 % | HEART RATE: 77 BPM | BODY MASS INDEX: 26.82 KG/M2 | DIASTOLIC BLOOD PRESSURE: 80 MMHG | HEIGHT: 72 IN | TEMPERATURE: 97.7 F

## 2022-06-08 DIAGNOSIS — R35.0 BENIGN PROSTATIC HYPERPLASIA WITH URINARY FREQUENCY: ICD-10-CM

## 2022-06-08 DIAGNOSIS — R73.09 ELEVATED GLUCOSE: ICD-10-CM

## 2022-06-08 DIAGNOSIS — E78.49 OTHER HYPERLIPIDEMIA: ICD-10-CM

## 2022-06-08 DIAGNOSIS — Z23 NEED FOR PNEUMOCOCCAL VACCINE: ICD-10-CM

## 2022-06-08 DIAGNOSIS — I10 ESSENTIAL HYPERTENSION: ICD-10-CM

## 2022-06-08 DIAGNOSIS — Z00.00 MEDICARE ANNUAL WELLNESS VISIT, SUBSEQUENT: Primary | ICD-10-CM

## 2022-06-08 DIAGNOSIS — R19.5 POSITIVE FIT (FECAL IMMUNOCHEMICAL TEST): ICD-10-CM

## 2022-06-08 DIAGNOSIS — N40.1 BENIGN PROSTATIC HYPERPLASIA WITH URINARY FREQUENCY: ICD-10-CM

## 2022-06-08 DIAGNOSIS — B35.1 ONYCHOMYCOSIS: ICD-10-CM

## 2022-06-08 DIAGNOSIS — G62.9 NEUROPATHY: ICD-10-CM

## 2022-06-08 DIAGNOSIS — N18.31 STAGE 3A CHRONIC KIDNEY DISEASE (HCC): ICD-10-CM

## 2022-06-08 PROBLEM — N18.30 CHRONIC RENAL DISEASE, STAGE III (HCC): Status: ACTIVE | Noted: 2022-06-08

## 2022-06-08 PROCEDURE — 90670 PCV13 VACCINE IM: CPT | Performed by: NURSE PRACTITIONER

## 2022-06-08 PROCEDURE — G0439 PPPS, SUBSEQ VISIT: HCPCS | Performed by: NURSE PRACTITIONER

## 2022-06-08 PROCEDURE — G8427 DOCREV CUR MEDS BY ELIG CLIN: HCPCS | Performed by: NURSE PRACTITIONER

## 2022-06-08 PROCEDURE — 1123F ACP DISCUSS/DSCN MKR DOCD: CPT | Performed by: NURSE PRACTITIONER

## 2022-06-08 PROCEDURE — 1036F TOBACCO NON-USER: CPT | Performed by: NURSE PRACTITIONER

## 2022-06-08 PROCEDURE — PBSHW PNEUMOCOCCAL, PCV-13, PREVNAR 13, (AGE 6 WKS+), IM: Performed by: NURSE PRACTITIONER

## 2022-06-08 PROCEDURE — G8417 CALC BMI ABV UP PARAM F/U: HCPCS | Performed by: NURSE PRACTITIONER

## 2022-06-08 PROCEDURE — 99214 OFFICE O/P EST MOD 30 MIN: CPT | Performed by: NURSE PRACTITIONER

## 2022-06-08 SDOH — ECONOMIC STABILITY: FOOD INSECURITY: WITHIN THE PAST 12 MONTHS, THE FOOD YOU BOUGHT JUST DIDN'T LAST AND YOU DIDN'T HAVE MONEY TO GET MORE.: NEVER TRUE

## 2022-06-08 SDOH — ECONOMIC STABILITY: FOOD INSECURITY: WITHIN THE PAST 12 MONTHS, YOU WORRIED THAT YOUR FOOD WOULD RUN OUT BEFORE YOU GOT MONEY TO BUY MORE.: NEVER TRUE

## 2022-06-08 ASSESSMENT — ENCOUNTER SYMPTOMS
DIARRHEA: 1
SINUS PAIN: 0
COUGH: 0
CONSTIPATION: 0
SHORTNESS OF BREATH: 0
RHINORRHEA: 1
SORE THROAT: 0
BLOOD IN STOOL: 0
SINUS PRESSURE: 0
ABDOMINAL PAIN: 0

## 2022-06-08 ASSESSMENT — SOCIAL DETERMINANTS OF HEALTH (SDOH): HOW HARD IS IT FOR YOU TO PAY FOR THE VERY BASICS LIKE FOOD, HOUSING, MEDICAL CARE, AND HEATING?: NOT HARD AT ALL

## 2022-06-08 NOTE — PROGRESS NOTES
Medicare Annual Wellness Visit    [place] is here for Medicare AWV [facility] ) and Gas (has had trouble with gas for a while now. has tried otc meds with little help. noticies certain foods make it worse. )         Subjective     Wellness:  He admits well balanced diet. For exercise he notes completing yardwork and walking 0.5 miles 4-5 days per week. He admits routine eye exams. He admits routine dental exams. He is vaccinated for COVID. He has not received shingles vaccination. He has never received pneumococcal vaccinations. Most recent tetanus vaccine unknown. CKD:  6/6/22 Cr 1.45. He admits drinking \"a lot\" of water. Hypertension:  Current medication regimen includes amlodipine 10 mg and losartan-hydrochlorothiazide 100-25 mg daily.  Admits following a low-sodium diet.  Denies vision changes, chest pain, heart palpitations, shortness of breath, lower extremity swelling, or headaches. Admits occasional lightheadedness when standing up too quickly. He admits two episodes of more severe dizziness that felt like the room was spinning, that was made worse with lying supine. He had nausea at this time. This quickly passed and he associated it with his history of vertigo.      Hyperlipidemia:   Current treatment includes atorvastatin 10mg QD. Denies side effects. For exercise, he completes yardwork and walks.  [facility] admits a well balanced diet.     Positive Risk Factor Screenings with Interventions:               General Health and ACP:  General  In general, how would you say your health is?: Good  In the past 7 days, have you experienced any of the following: New or Increased Pain, New or Increased Fatigue, Loneliness, Social Isolation, Stress or Anger?: No  Do you get the social and emotional support that you need?: Yes  Do you have a Living Will?: Yes    Advance Directives     Power of  Living Will ACP-Advance Directive ACP-Power of     Not on File Not on File Not on File Not on File General Health Risk Interventions:  · Patient confirms living will in place              Review of Systems   Constitutional: Negative for chills, fatigue, fever and unexpected weight change. HENT: Positive for rhinorrhea (improved with vicks). Negative for congestion, sinus pressure, sinus pain and sore throat. Respiratory: Negative for cough and shortness of breath. Cardiovascular: Negative for chest pain and palpitations. Gastrointestinal: Positive for diarrhea. Negative for abdominal pain, blood in stool and constipation. Admits increased \"gas\". He states that his symptoms worsen with dairy. He takes lactaid PRN. He has tried Gas-X with minimal relief. Genitourinary: Positive for difficulty urinating (Admits slower stream). Musculoskeletal: Positive for arthralgias (admits burning in feet bilaterally, occuring daily. burning is more intense and starting to affect his balance). Negative for joint swelling and myalgias. Skin: Negative for rash. Neurological: Positive for dizziness (as noted in HPI). Negative for light-headedness and headaches. Objective   Vitals:    06/08/22 1257   BP: 112/80   Pulse: 77   Resp: 14   Temp: 97.7 °F (36.5 °C)   SpO2: 96%   Weight: 198 lb (89.8 kg)   Height: 6' (1.829 m)      Body mass index is 26.85 kg/m². Physical Exam  Constitutional:       General: He is not in acute distress. Appearance: Normal appearance. He is normal weight. He is not ill-appearing or toxic-appearing. HENT:      Head: Normocephalic. Right Ear: Tympanic membrane, ear canal and external ear normal. There is no impacted cerumen. Left Ear: Tympanic membrane, ear canal and external ear normal. There is no impacted cerumen. Nose: Nose normal. No congestion or rhinorrhea. Mouth/Throat:      Mouth: Mucous membranes are moist.      Pharynx: No oropharyngeal exudate or posterior oropharyngeal erythema.    Cardiovascular:      Rate and Rhythm: Normal rate and regular rhythm. Pulses:           Dorsalis pedis pulses are 2+ on the right side and 2+ on the left side. Posterior tibial pulses are 2+ on the right side and 2+ on the left side. Heart sounds: Normal heart sounds. No murmur heard. Pulmonary:      Effort: Pulmonary effort is normal. No respiratory distress. Breath sounds: Normal breath sounds. No stridor. No wheezing, rhonchi or rales. Abdominal:      General: There is no distension. Palpations: Abdomen is soft. There is no mass. Tenderness: There is no abdominal tenderness. There is no guarding. Hernia: No hernia is present. Comments: Hyperactive bowel sounds all 4 quadrants   Musculoskeletal:      Cervical back: Neck supple. Feet:      Right foot:      Protective Sensation: 6 sites tested. 6 sites sensed. Skin integrity: Dry skin present. No ulcer, blister, skin breakdown, erythema, warmth, callus or fissure. Toenail Condition: Right toenails are abnormally thick. Fungal disease present. Left foot:      Protective Sensation: 6 sites tested. 6 sites sensed. Skin integrity: Dry skin present. No blister, skin breakdown, erythema, warmth, callus or fissure. Toenail Condition: Left toenails are abnormally thick. Fungal disease present. Comments: 6/6 WNL monofilament testing bilaterally  Lymphadenopathy:      Cervical: No cervical adenopathy. Skin:     General: Skin is warm. Neurological:      Mental Status: He is alert and oriented to person, place, and time. Psychiatric:         Mood and Affect: Mood normal.         Behavior: Behavior normal.         Thought Content: Thought content normal.         Judgment: Judgment normal.             Allergies   Allergen Reactions    Terramycin [Oxytetracycline]     Lactose Other (See Comments)     Gas     Prior to Visit Medications    Medication Sig Taking?  Authorizing Provider   losartan-hydroCHLOROthiazide (HYZAAR) 100-25 MG per tablet TAKE 1 TABLET EVERY DAY Yes Colen Spore, APRN - CNP   amLODIPine (NORVASC) 10 MG tablet TAKE 1 TABLET EVERY DAY Yes Colen Spore, APRN - CNP   atorvastatin (LIPITOR) 10 MG tablet Take 1 tablet by mouth daily Yes Colen Spore, APRN - CNP   vitamin D3 (CHOLECALCIFEROL) 10 MCG (400 UNIT) TABS tablet Take 400 Units by mouth daily Yes Historical Provider, MD   loratadine (CLARITIN) 10 MG capsule Take 10 mg by mouth daily Yes Historical Provider, MD   aspirin 81 MG tablet Take 81 mg by mouth daily Yes Historical Provider, MD     Increased flatulence:  - Continue Gas-X if beneficial  - Lactaid as needed with dairy  - Initiate OTC probiotic  - Patient was given a list of gas-producing foods and encouraged dietary lifestyle modifications    History of positive FIT kit:  - Patient has a history of positive FIT kit 12/2020. He was evaluated by Dr. Kirill Rock [facility]) 1/2021 to discuss. At this time, Dr. Kirill Rock recommended a colonoscopy but the patient did not want to complete this. - Today, the patient notes concerns with irregular bowels and increased flatulence. He would like to follow-up on his positive FIT kit and have a repeat consult with Dr. Kirill Rock. Will refer to Dr. Kirill Rock. Wellness:  - Counseled on well-balanced diet and routine physical activity  - Encouraged continued eye and dental exams  - He is UTD COVID-vaccine  - Discussed that he is due for tetanus, shingles, pneumococcal vaccines today. - Patient agreeable to pneumococcal vaccine today in office. PPSV 13 administered. - Patient confirms he does have living will in place. CKD:  - Stable  - Reviewed lab work from 6/2022, creatinine 1.45  - Encouraged adequate water intake, low-salt diet, and avoidance of NSAIDs  - Repeat BMP 6 months    BPH:  - Patient-reported diagnosis. Symptomatic with urinary hesitancy.   - Offered initiation of a medication such as Flomax, patient declines    Neuropathy:  - Foot exam WNL with monofilament testing  - Discussed various treatment options such as gabapentin, patient declined stating \"his symptoms do not stop him from doing anything\". He prefers to monitor symptoms and call with any concerns. - I do recommend shoe inserts. Onychomycosis:  - Patient is interested to referral to Dr. Reuben Connor (podiatry) for nail care. This placed today.     CareTeam (Including outside providers/suppliers regularly involved in providing care):   Patient Care Team:  GOLD Cooper CNP as PCP - General (Family Nurse Practitioner)  GOLD Cooper CNP as PCP - REHABILITATION [facility] Empaneled Provider  Shane Lehman MD as Consulting Physician [facility])     Reviewed and updated this visit:  Allergies  Meds

## 2022-06-08 NOTE — PATIENT INSTRUCTIONS
I recommend starting to take a daily probiotic     Due for shingles, tetanus, and pneumococcal vaccine    SURVEY:    You may be receiving a survey from [employer] regarding your visit today. Please complete the survey to enable us to provide the highest quality of care to you and your family. If you cannot score us a very good on any question, please call the office to discuss how we could of made your experience a very good one. Thank you.

## 2022-06-09 ENCOUNTER — TELEPHONE (OUTPATIENT)
Dept: SURGERY | Age: 82
End: 2022-06-09

## 2022-06-09 PROBLEM — N40.1 BENIGN PROSTATIC HYPERPLASIA WITH URINARY FREQUENCY: Status: ACTIVE | Noted: 2022-06-09

## 2022-06-09 PROBLEM — R35.0 BENIGN PROSTATIC HYPERPLASIA WITH URINARY FREQUENCY: Status: ACTIVE | Noted: 2022-06-09

## 2022-06-09 NOTE — TELEPHONE ENCOUNTER
Spoke with patient regarding referral. Informed patient that Dr. Karely Johnson is not currently scheduling colonoscopys due to being booked to the end of the year. Offered to send referral to Dr. Joseph Franco, patient kindly declined, however did take phone number in case he does change his mind. Phoned Marleni Craft office to make aware.

## 2022-07-11 ENCOUNTER — HOSPITAL ENCOUNTER (OUTPATIENT)
Age: 82
Discharge: HOME | End: 2022-07-11
Payer: MEDICARE

## 2022-07-11 ENCOUNTER — OFFICE VISIT (OUTPATIENT)
Dept: GASTROENTEROLOGY | Age: 82
End: 2022-07-11
Payer: MEDICARE

## 2022-07-11 ENCOUNTER — TELEPHONE (OUTPATIENT)
Dept: GASTROENTEROLOGY | Age: 82
End: 2022-07-11

## 2022-07-11 VITALS
DIASTOLIC BLOOD PRESSURE: 74 MMHG | HEIGHT: 72 IN | TEMPERATURE: 97.3 F | BODY MASS INDEX: 26.61 KG/M2 | SYSTOLIC BLOOD PRESSURE: 124 MMHG | HEART RATE: 85 BPM | WEIGHT: 196.5 LBS | RESPIRATION RATE: 18 BRPM

## 2022-07-11 DIAGNOSIS — Z01.818 PRE-OP TESTING: ICD-10-CM

## 2022-07-11 DIAGNOSIS — Z12.11 COLON CANCER SCREENING: Primary | ICD-10-CM

## 2022-07-11 DIAGNOSIS — Z01.818 PRE-OP TESTING: Primary | ICD-10-CM

## 2022-07-11 DIAGNOSIS — R19.7 DIARRHEA, UNSPECIFIED TYPE: ICD-10-CM

## 2022-07-11 DIAGNOSIS — Z12.11 COLON CANCER SCREENING: ICD-10-CM

## 2022-07-11 LAB
REASON FOR REJECTION: NORMAL
ZZ NTE CLEAN UP: ORDERED TEST: NORMAL
ZZ NTE WITH NAME CLEAN UP: SPECIMEN SOURCE: NORMAL

## 2022-07-11 PROCEDURE — 99202 OFFICE O/P NEW SF 15 MIN: CPT | Performed by: INTERNAL MEDICINE

## 2022-07-11 PROCEDURE — 99214 OFFICE O/P EST MOD 30 MIN: CPT

## 2022-07-11 PROCEDURE — 83516 IMMUNOASSAY NONANTIBODY: CPT

## 2022-07-11 PROCEDURE — 87506 IADNA-DNA/RNA PROBE TQ 6-11: CPT

## 2022-07-11 PROCEDURE — 87328 CRYPTOSPORIDIUM AG IA: CPT

## 2022-07-11 PROCEDURE — 82784 ASSAY IGA/IGD/IGG/IGM EACH: CPT

## 2022-07-11 PROCEDURE — 93005 ELECTROCARDIOGRAM TRACING: CPT

## 2022-07-11 PROCEDURE — 1123F ACP DISCUSS/DSCN MKR DOCD: CPT | Performed by: INTERNAL MEDICINE

## 2022-07-11 PROCEDURE — 87329 GIARDIA AG IA: CPT

## 2022-07-11 RX ORDER — MELOXICAM 15 MG/1
15 TABLET ORAL DAILY
COMMUNITY
End: 2022-09-13

## 2022-07-11 RX ORDER — VITAMIN B COMPLEX
1 CAPSULE ORAL DAILY
COMMUNITY

## 2022-07-11 RX ORDER — POLYETHYLENE GLYCOL 3350, SODIUM CHLORIDE, SODIUM BICARBONATE, POTASSIUM CHLORIDE 420; 11.2; 5.72; 1.48 G/4L; G/4L; G/4L; G/4L
4000 POWDER, FOR SOLUTION ORAL ONCE
Qty: 4000 ML | Refills: 0 | Status: SHIPPED | OUTPATIENT
Start: 2022-07-11 | End: 2022-07-11

## 2022-07-11 ASSESSMENT — ENCOUNTER SYMPTOMS
EYES NEGATIVE: 1
CONSTIPATION: 1
DIARRHEA: 1
RESPIRATORY NEGATIVE: 1

## 2022-07-11 NOTE — PROGRESS NOTES
CC: Colon cancer screening    HPI     Mr. Suhas Solorzano is an 78year old man with a history of hypertension, hyperlipidemia who presents with a complaint of rectal bleeding in the past 1 year, gas, diarrhea. He has a history of small bowel obstruction in 02/2020. He states he now has urgency with the diarrhea which borders on being explosive. His wife states that the gas is foul smelling and has affected his quality of life - his wife who is present states that they no longer share a room due to the gas. He reports that the diarrhea has been ongoing for over a year. He reports that he has heartburn once every 6 months - otherwise he has no reflux. He reports that his last colonoscopy was 15 years ago. He states that he has since changed his diet - no artificial sweeteners, no ice cream, no carbonated drinks. Family history of colon cancer: No  Blood in stool: Yes - about a year ago  Unintentional weight loss: No  Abdominal pain: No  Prior colonoscopy: Yes - 15 years ago at least  Constipation history: No  Number of bowel movements a day: 1-5 per day  Change in stool caliber:  It changes throughout the day        Past Medical History:   Diagnosis Date    Hyperlipidemia     Hypertension     Skin cancer          Past Surgical History:   Procedure Laterality Date    CATARACT EXTRACTION W/  INTRAOCULAR LENS IMPLANT Right 03/05/2018    per Dr. Alisha Antunez Left 04/02/2018    COLONOSCOPY      over 15 yrs ago    [place] XCAPSL CTRC RMVL INSJ IO LENS PROSTH W/O ECP Right 03/05/2018    EYE CATARACT EMULSIFICATION IOL IMPLANT performed by Cele Knight DO at [facility] RMVL INSJ IO LENS PROSTH W/O ECP Left 04/02/2018    EYE CATARACT EMULSIFICATION IOL IMPLANT performed by Cele Knight DO at [address] [place] [address]         Current Outpatient Medications   Medication Sig Dispense Refill    losartan-hydroCHLOROthiazide (HYZAAR) 100-25 MG per tablet TAKE 1 TABLET EVERY DAY 90 tablet 3    amLODIPine (NORVASC) 10 MG tablet TAKE 1 TABLET EVERY DAY 90 tablet 3    atorvastatin (LIPITOR) 10 MG tablet Take 1 tablet by mouth daily 90 tablet 3    vitamin D3 (CHOLECALCIFEROL) 10 MCG (400 UNIT) TABS tablet Take 400 Units by mouth daily      loratadine (CLARITIN) 10 MG capsule Take 10 mg by mouth daily      aspirin 81 MG tablet Take 81 mg by mouth daily       No current facility-administered medications for this visit. No family history on file. Social Determinants of Health     Tobacco Use:     Smoking Tobacco Use: Not on file    Smokeless Tobacco Use: Not on file   Alcohol Use: Not At Risk    Frequency of Alcohol Consumption: 2-4 times a month    Average Number of Drinks: 1 or 2    Frequency of Binge Drinking: Never   Financial Resource Strain: Low Risk     Difficulty of Paying Living Expenses: Not hard at all   Food Insecurity: No Food Insecurity    Worried About Running Out of Food in the Last Year: Never true    Vin of Food in the Last Year: Never true   Transportation Needs:     Lack of Transportation (Medical): Not on file    Lack of Transportation (Non-Medical):  Not on file   Physical Activity: Sufficiently Active    Days of Exercise per Week: 7 days    Minutes of Exercise per Session: 60 min   Stress:     Feeling of Stress : Not on file   Social Connections:     Frequency of Communication with Friends and Family: Not on file    Frequency of Social Gatherings with Friends and Family: Not on file    Attends Judaism Services: Not on file    Active Member of Clubs or Organizations: Not on file    Attends Club or Organization Meetings: Not on file    Marital Status: Not on file   Intimate Partner Violence:     Fear of Current or Ex-Partner: Not on file    Emotionally Abused: Not on file    Physically Abused: Not on file    Sexually Abused: Not on file   Depression: Not at risk    PHQ-2 Score: 0   Housing Stability:     Unable to Pay for Housing in the Last Year: Not on file    Number of Places Lived in the Last Year: Not on file    Unstable Housing in the Last Year: Not on file       Review of Systems   Constitutional: Negative. HENT: Negative. Eyes: Negative. Respiratory: Negative. Cardiovascular: Negative. Gastrointestinal: Positive for constipation and diarrhea. Endocrine:        Hyperlipidemia   Genitourinary: Negative. Musculoskeletal:        Tenderness in thumbs, stiffness with yard work   Neurological: Negative. Hematological: Negative. Psychiatric/Behavioral: Negative. There were no vitals taken for this visit. Physical Exam  Constitutional:       Appearance: Normal appearance. HENT:      Head: Normocephalic. Nose: Nose normal.      Mouth/Throat:      Mouth: Mucous membranes are moist.      Comments: Mallampati score 2  Eyes:      Pupils: Pupils are equal, round, and reactive to light. Cardiovascular:      Rate and Rhythm: Normal rate and regular rhythm. Pulses: Normal pulses. Heart sounds: Normal heart sounds. Pulmonary:      Effort: Pulmonary effort is normal.      Breath sounds: Normal breath sounds. Abdominal:      General: Abdomen is flat. There is no distension. Palpations: Abdomen is soft. There is no mass. Hernia: No hernia is present. Musculoskeletal:         General: Normal range of motion. Cervical back: Normal range of motion and neck supple. Skin:     General: Skin is warm. Neurological:      General: No focal deficit present. Mental Status: He is alert and oriented to person, place, and time.    Psychiatric:         Mood and Affect: Mood normal.           Lab Results   Component Value Date    WBC 7.7 06/06/2022    HGB 14.2 06/06/2022    HCT 41.6 06/06/2022    MCV 90.6 06/06/2022     06/06/2022        Lab Results   Component Value Date     06/06/2022    K 4.1 06/06/2022     06/06/2022    CO2 25 06/06/2022    BUN 22 06/06/2022 CREATININE 1.45 (H) 06/06/2022    GLUCOSE 99 06/06/2022    CALCIUM 9.5 06/06/2022    PROT 7.5 02/11/2020    LABALBU 5.0 02/11/2020    BILITOT 0.45 02/11/2020    ALKPHOS 63 02/11/2020    AST 16 06/06/2022    ALT 18 06/06/2022    LABGLOM 46 (L) 06/06/2022    GFRAA 56 (L) 06/06/2022         Assessment      Mr. Marce Bahena is an 78year old man with a history of hypertension, hyperlipidemia who presents with a complaint of rectal bleeding in the past 1 year, foul smelling gas, diarrhea - ASA 2, Mallampati score 2. The differential includes microscopic colitis, infectious diarrhea, IBS, IBD, Celiac disease, FODMAP intolerance, colon cancer. Plan    1. Colon cancer screening    - COLONOSCOPY (Screening); Future  - polyethylene glycol-electrolytes (NULYTELY) 420 g solution; Take 4,000 mLs by mouth once for 1 dose  Dispense: 4000 mL; Refill: 0  - Clostridium Difficile Toxin/Antigen; Future  - Ova and parasite screen; Future  - Tissue Transglutaminase, IgA; Future  - IgA; Future  - Tissue Transglutaminase, IgG; Future  - Endomysial Antibody, IgA; Future  - Giardia / Cryptosporidum antigens; Future  - Gastrointestinal Panel, Molecular; Future    2. Diarrhea, unspecified type  - Clostridium Difficile Toxin/Antigen; Future  - Ova and parasite screen; Future  - Tissue Transglutaminase, IgA; Future  - IgA; Future  - Tissue Transglutaminase, IgG; Future  - Endomysial Antibody, IgA; Future  - Giardia / Cryptosporidum antigens; Future  - Gastrointestinal Panel, Molecular; Future    3. Follow-up in 3-4 weeks or sooner as needed      Informed consent was obtained with a discussion about potential risks and complications of the procedure. Patient verbalized understanding and willingness to continue with the procedure scheduling.      Spent 25 minutes with the patient with greater than 50 percent of the time was spent on face-to-face time in discussion with the patient regarding diagnostic options/results, treatment options, counseling, and follow-up plan.       Jenise Lee MD

## 2022-07-11 NOTE — TELEPHONE ENCOUNTER
Patient was seen in office today Colonoscopy scheduled for 8/23 prep instruction and EKG order handed to patient surgery schedule faxed to surgery

## 2022-07-12 LAB
CAMPYLOBACTER PCR: NORMAL
CRYPTOSPORIDIUM ANTIGEN STOOL: NEGATIVE
E COLI ENTEROTOXIGENIC PCR: NORMAL
EKG ATRIAL RATE: 72 BPM
EKG P AXIS: 59 DEGREES
EKG P-R INTERVAL: 194 MS
EKG Q-T INTERVAL: 394 MS
EKG QRS DURATION: 102 MS
EKG QTC CALCULATION (BAZETT): 431 MS
EKG R AXIS: -18 DEGREES
EKG T AXIS: 35 DEGREES
EKG VENTRICULAR RATE: 72 BPM
GIARDIA ANTIGEN STOOL: NEGATIVE
IGA: 51 MG/DL (ref 70–400)
PLESIOMONAS SHIGELLOIDES PCR: NORMAL
SALMONELLA PCR: NORMAL
SHIGATOXIN GENE PCR: NORMAL
SHIGELLA SP PCR: NORMAL
SOURCE: NORMAL
SPECIMEN DESCRIPTION: NORMAL
VIBRIO PCR: NORMAL
YERSINIA ENTEROCOLITICA PCR: NORMAL

## 2022-07-13 LAB — TISSUE TRANSGLUTAMINASE IGA: <0.1 U/ML

## 2022-07-14 LAB — TISSUE TRANSGLUTAMINASE ANTIBODY IGG: <0.6 U/ML

## 2022-07-15 RX ORDER — ATORVASTATIN CALCIUM 10 MG/1
TABLET, FILM COATED ORAL
Qty: 90 TABLET | Refills: 3 | Status: SHIPPED | OUTPATIENT
Start: 2022-07-15

## 2022-08-22 ENCOUNTER — ANESTHESIA EVENT (OUTPATIENT)
Dept: OPERATING ROOM | Age: 82
End: 2022-08-22
Payer: MEDICARE

## 2022-08-23 ENCOUNTER — ANESTHESIA (OUTPATIENT)
Dept: OPERATING ROOM | Age: 82
End: 2022-08-23
Payer: MEDICARE

## 2022-08-23 ENCOUNTER — HOSPITAL ENCOUNTER (OUTPATIENT)
Age: 82
Setting detail: OUTPATIENT SURGERY
Discharge: HOME | End: 2022-08-23
Attending: INTERNAL MEDICINE | Admitting: INTERNAL MEDICINE
Payer: MEDICARE

## 2022-08-23 VITALS
HEART RATE: 59 BPM | BODY MASS INDEX: 26.09 KG/M2 | TEMPERATURE: 99.1 F | WEIGHT: 192.6 LBS | OXYGEN SATURATION: 98 % | HEIGHT: 72 IN | SYSTOLIC BLOOD PRESSURE: 155 MMHG | RESPIRATION RATE: 12 BRPM | DIASTOLIC BLOOD PRESSURE: 86 MMHG

## 2022-08-23 DIAGNOSIS — Z12.11 SCREENING FOR COLON CANCER: ICD-10-CM

## 2022-08-23 DIAGNOSIS — K52.9 CHRONIC DIARRHEA: Primary | ICD-10-CM

## 2022-08-23 PROCEDURE — 88305 TISSUE EXAM BY PATHOLOGIST: CPT

## 2022-08-23 PROCEDURE — 7100000011 HC PHASE II RECOVERY - ADDTL 15 MIN: Performed by: INTERNAL MEDICINE

## 2022-08-23 PROCEDURE — 45385 COLONOSCOPY W/LESION REMOVAL: CPT | Performed by: INTERNAL MEDICINE

## 2022-08-23 PROCEDURE — 3700000001 HC ADD 15 MINUTES (ANESTHESIA): Performed by: INTERNAL MEDICINE

## 2022-08-23 PROCEDURE — 2709999900 HC NON-CHARGEABLE SUPPLY: Performed by: INTERNAL MEDICINE

## 2022-08-23 PROCEDURE — 2500000003 HC RX 250 WO HCPCS: Performed by: NURSE ANESTHETIST, CERTIFIED REGISTERED

## 2022-08-23 PROCEDURE — 2580000003 HC RX 258: Performed by: NURSE ANESTHETIST, CERTIFIED REGISTERED

## 2022-08-23 PROCEDURE — 7100000010 HC PHASE II RECOVERY - FIRST 15 MIN: Performed by: INTERNAL MEDICINE

## 2022-08-23 PROCEDURE — 3700000000 HC ANESTHESIA ATTENDED CARE: Performed by: INTERNAL MEDICINE

## 2022-08-23 PROCEDURE — 6360000002 HC RX W HCPCS: Performed by: NURSE ANESTHETIST, CERTIFIED REGISTERED

## 2022-08-23 PROCEDURE — 3609010600 HC COLONOSCOPY POLYPECTOMY SNARE/COLD BIOPSY: Performed by: INTERNAL MEDICINE

## 2022-08-23 RX ORDER — PROPOFOL 10 MG/ML
INJECTION, EMULSION INTRAVENOUS CONTINUOUS PRN
Status: DISCONTINUED | OUTPATIENT
Start: 2022-08-23 | End: 2022-08-23 | Stop reason: SDUPTHER

## 2022-08-23 RX ORDER — PROPOFOL 10 MG/ML
INJECTION, EMULSION INTRAVENOUS PRN
Status: DISCONTINUED | OUTPATIENT
Start: 2022-08-23 | End: 2022-08-23 | Stop reason: SDUPTHER

## 2022-08-23 RX ORDER — SODIUM CHLORIDE, SODIUM LACTATE, POTASSIUM CHLORIDE, CALCIUM CHLORIDE 600; 310; 30; 20 MG/100ML; MG/100ML; MG/100ML; MG/100ML
INJECTION, SOLUTION INTRAVENOUS CONTINUOUS
Status: DISCONTINUED | OUTPATIENT
Start: 2022-08-23 | End: 2022-08-23 | Stop reason: HOSPADM

## 2022-08-23 RX ORDER — LIDOCAINE HYDROCHLORIDE 20 MG/ML
INJECTION, SOLUTION EPIDURAL; INFILTRATION; INTRACAUDAL; PERINEURAL PRN
Status: DISCONTINUED | OUTPATIENT
Start: 2022-08-23 | End: 2022-08-23 | Stop reason: SDUPTHER

## 2022-08-23 RX ADMIN — PROPOFOL 80 MG: 10 INJECTION, EMULSION INTRAVENOUS at 10:19

## 2022-08-23 RX ADMIN — PROPOFOL 140 MCG/KG/MIN: 10 INJECTION, EMULSION INTRAVENOUS at 10:21

## 2022-08-23 RX ADMIN — PROPOFOL 100 MCG/KG/MIN: 10 INJECTION, EMULSION INTRAVENOUS at 10:24

## 2022-08-23 RX ADMIN — LIDOCAINE HYDROCHLORIDE 80 MG: 20 INJECTION, SOLUTION EPIDURAL; INFILTRATION; INTRACAUDAL; PERINEURAL at 10:19

## 2022-08-23 RX ADMIN — SODIUM CHLORIDE, POTASSIUM CHLORIDE, SODIUM LACTATE AND CALCIUM CHLORIDE: 600; 310; 30; 20 INJECTION, SOLUTION INTRAVENOUS at 09:27

## 2022-08-23 ASSESSMENT — PAIN - FUNCTIONAL ASSESSMENT: PAIN_FUNCTIONAL_ASSESSMENT: NONE - DENIES PAIN

## 2022-08-23 NOTE — H&P
History and Physical    Patient's Name/Date of Birth: Marni Timmons / 1940 (46 y.o.)    MRN: [id number]     Date: August 23, 2022       CHIEF COMPLAINT:  Rectal bleeding      Mr. Florida Castillo is an 78year old man with a history of hypertension, hyperlipidemia who presents with a complaint of rectal bleeding in the past 1 year, gas, diarrhea. He has a history of small bowel obstruction in 02/2020. He states he now has urgency with the diarrhea which borders on being explosive. His wife states that the gas is foul smelling and has affected his quality of life - his wife who is present states that they no longer share a room due to the gas. He reports that the diarrhea has been ongoing for over a year. He reports that he has heartburn once every 6 months - otherwise he has no reflux. He reports that his last colonoscopy was 15 years ago. He states that he has since changed his diet - no artificial sweeteners, no ice cream, no carbonated drinks. Family history of colon cancer: No  Blood in stool: Yes - about a year ago  Unintentional weight loss: No  Abdominal pain: No  Prior colonoscopy: Yes - 15 years ago at least  Constipation history: No  Number of bowel movements a day: 1-5 per day  Change in stool caliber:  It changes throughout the day       Past Medical History:   Diagnosis Date    Bowel obstruction ([address] 2019    Hyperlipidemia     Hypertension     Skin cancer      Past Surgical History:   Procedure Laterality Date    CATARACT REMOVAL WITH IMPLANT Right 03/05/2018    per Dr. Silvana Crowley Left 04/02/2018    COLONOSCOPY      over 15 yrs ago    [place] XCAPSL CTRC RMVL INSJ IO LENS PROSTH W/O ECP Right 03/05/2018    EYE CATARACT EMULSIFICATION IOL IMPLANT performed by Rishi Emery DO at [address] W/O ECP Left 04/02/2018    EYE CATARACT EMULSIFICATION IOL IMPLANT performed by Rishi Emery DO at [address]     Current Facility-Administered Medications muscles. Cardiovascular: Heart sounds were normal with a regular rate and rhythm. There were no murmurs, gallops or rubs. Abdomen: Bowel sounds were normal.  The abdomen was soft and non distended. There was no tenderness, guarding, rebound, or rigidity. There were no masses, hepatosplenomegaly, or hernias. Mr. Lyle Reeves is an 78year old man with a history of hypertension, hyperlipidemia who presents with a complaint of rectal bleeding in the past 1 year, foul smelling gas, diarrhea - ASA 2, Mallampati score 2. The differential includes microscopic colitis, infectious diarrhea, IBS, IBD, Celiac disease, FODMAP intolerance, colon cancer    Plan:    Colonoscopy  Additional recommendations based on findings    VERIFICATION OF CONSENT    The patient was counseled regarding the procedure, its indications, risks, potential complications and alternatives. Any questions were answered.  Consent was obtained    Electronically signed by Millie Irvin MD on 8/23/2022 at 9:37 AM

## 2022-08-23 NOTE — OP NOTE
ileum appeared normal with no lesions. No ulcers in colon. Random colon and terminal ileum biopsies obtained. Retroflexion was performed in the rectum and mild internal hemorrhoids were noted. Withdrawal time was 11 minutes. IMPRESSION: Concern is the the rectal bleeding and diarrhea may have been as a result of diverticulitis which has since resolved. Diverticulosis  Cecal polyp   Mild internal hemorrhoids    RECOMMENDATIONS:   1) Follow up with referring provider, as previously scheduled. 2) Repeat Colonoscopy for screening not recommended due to age. Can be repeated as needed based on symptoms. Electronically signed by Malachi Kerr MD on 8/23/2022 at 10:45 AM       The patient was counseled at length about the risks of vero Covid-19 during their perioperative period and any recovery window from their procedure. The patient was made aware that vero Covid-19  may worsen their prognosis for recovering from their procedure  and lend to a higher morbidity and/or mortality risk. All material risks, benefits, and reasonable alternatives including postponing the procedure were discussed. The patient DOES wish to proceed with the procedure at this time.

## 2022-08-23 NOTE — ANESTHESIA PRE PROCEDURE
Department of Anesthesiology  Preprocedure Note       Name:  Sandy Baez   Age:  78 y.o.  :  1940                                          MRN:  [id number]         Date:  2022      Surgeon: Thao Thorne):  Mena Palencia MD    Procedure: Procedure(s):  COLORECTAL CANCER SCREENING, HIGH RISK    Medications prior to admission:   Prior to Admission medications    Medication Sig Start Date End Date Taking? Authorizing Provider   atorvastatin (LIPITOR) 10 MG tablet TAKE 1 TABLET EVERY DAY 7/15/22   GOLD Vergara CNP   meloxicam (MOBIC) 15 MG tablet Take 15 mg by mouth daily    Historical Provider, MD   b complex vitamins capsule Take 1 capsule by mouth daily    Historical Provider, MD   losartan-hydroCHLOROthiazide (HYZAAR) 100-25 MG per tablet TAKE 1 TABLET EVERY DAY 22   GOLD Vergara CNP   amLODIPine (NORVASC) 10 MG tablet TAKE 1 TABLET EVERY DAY 1/10/22   GOLD Vergara CNP   vitamin D3 (CHOLECALCIFEROL) 10 MCG (400 UNIT) TABS tablet Take 400 Units by mouth daily    Historical Provider, MD   loratadine (CLARITIN) 10 MG capsule Take 10 mg by mouth daily    Historical Provider, MD   aspirin 81 MG tablet Take 81 mg by mouth daily  Patient not taking: No sig reported    Historical Provider, MD       Current medications:    Current Facility-Administered Medications   Medication Dose Route Frequency Provider Last Rate Last Admin    lactated ringers infusion   IntraVENous Continuous Daphine Loop, APRN - CRNA 100 mL/hr at 22 0927 New Bag at 22 09       Allergies:     Allergies   Allergen Reactions    Terramycin [Oxytetracycline]     Lactose Other (See Comments)     Gas       Problem List:    Patient Active Problem List   Diagnosis Code    Colitis K52.9    SBO (small bowel obstruction) (HCC) K56.609    Chronic diarrhea K52.9    Essential hypertension I10    Other hyperlipidemia E78.49    Stage 3a chronic kidney disease (HCC) N18.31    Chronic renal disease, stage III [facility]) [632016] N18.30    Benign prostatic hyperplasia with urinary frequency N40.1, R35.0       Past Medical History:        Diagnosis Date    Bowel obstruction ([address] 2019    Hyperlipidemia     Hypertension     Skin cancer        Past Surgical History:        Procedure Laterality Date    CATARACT REMOVAL WITH IMPLANT Right 2018    per Dr. Juan Zarate Left 2018    COLONOSCOPY      over 15 yrs ago    NM XCAPSL CTRC RMVL INSJ IO LENS PROSTH W/O ECP Right 2018    EYE CATARACT EMULSIFICATION IOL IMPLANT performed by Micaela Uribe DO at [facility] W/O ECP Left 2018    EYE CATARACT EMULSIFICATION IOL IMPLANT performed by Micaela Uribe DO at [facility] History:    Social History     Tobacco Use    Smoking status: Former     Types: Cigarettes     Quit date:      Years since quittin.6    Smokeless tobacco: Never   Substance Use Topics    Alcohol use: Yes     Comment: occ. Counseling given: Not Answered      Vital Signs (Current):   Vitals:    22 1032 22 0915 22 0922   BP:   (!) 149/93   Pulse:   74   Resp:   10   Temp:   36.3 °C (97.4 °F)   TempSrc:   Temporal   SpO2:   96%   Weight: 196 lb (88.9 kg) 192 lb 9.6 oz (87.4 kg)    Height: 6' (1.829 m)                                                BP Readings from Last 3 Encounters:   22 (!) 149/93   22 124/74   22 112/80       NPO Status: Time of last liquid consumption: 0400                        Time of last solid consumption: 1200                        Date of last liquid consumption: 22                        Date of last solid food consumption: 22    BMI:   Wt Readings from Last 3 Encounters:   22 192 lb 9.6 oz (87.4 kg)   22 196 lb 8 oz (89.1 kg)   22 198 lb (89.8 kg)     Body mass index is 26.12 kg/m².     CBC:   Lab Results   Component Value Date/Time    WBC 7.7 06/06/2022 08:25 AM    RBC 4.59 06/06/2022 08:25 AM    HGB 14.2 06/06/2022 08:25 AM    HCT 41.6 06/06/2022 08:25 AM    MCV 90.6 06/06/2022 08:25 AM    RDW 12.9 06/06/2022 08:25 AM     06/06/2022 08:25 AM       CMP:   Lab Results   Component Value Date/Time     06/06/2022 08:25 AM    K 4.1 06/06/2022 08:25 AM     06/06/2022 08:25 AM    CO2 25 06/06/2022 08:25 AM    BUN 22 06/06/2022 08:25 AM    CREATININE 1.45 06/06/2022 08:25 AM    GFRAA 56 06/06/2022 08:25 AM    LABGLOM 46 06/06/2022 08:25 AM    GLUCOSE 99 06/06/2022 08:25 AM    PROT 7.5 02/11/2020 09:20 PM    CALCIUM 9.5 06/06/2022 08:25 AM    BILITOT 0.45 02/11/2020 09:20 PM    ALKPHOS 63 02/11/2020 09:20 PM    AST 16 06/06/2022 08:25 AM    ALT 18 06/06/2022 08:25 AM       POC Tests: No results for input(s): POCGLU, POCNA, POCK, POCCL, POCBUN, POCHEMO, POCHCT in the last 72 hours.     Coags: No results found for: PROTIME, INR, APTT    HCG (If Applicable): No results found for: PREGTESTUR, PREGSERUM, HCG, HCGQUANT     ABGs: No results found for: PHART, PO2ART, SEJ9SYN, KYY1GJN, BEART, B8VKCICD     Type & Screen (If Applicable):  No results found for: LABABO, LABRH    Drug/Infectious Status (If Applicable):  No results found for: HIV, HEPCAB    COVID-19 Screening (If Applicable): No results found for: COVID19        Anesthesia Evaluation  Patient summary reviewed and Nursing notes reviewed no history of anesthetic complications:   Airway: Mallampati: III  TM distance: >3 FB   Neck ROM: full  Mouth opening: > = 3 FB   Dental: normal exam         Pulmonary:Negative Pulmonary ROS and normal exam  breath sounds clear to auscultation                             Cardiovascular:  Exercise tolerance: good (>4 METS),   (+) hypertension:,         Rhythm: regular  Rate: normal           Beta Blocker:  Not on Beta Blocker         Neuro/Psych:   Negative Neuro/Psych ROS              GI/Hepatic/Renal:   (+) renal disease: CRI, bowel prep, Endo/Other:    (+) : arthritis:., .                 Abdominal:             Vascular: negative vascular ROS. Other Findings:           Anesthesia Plan      general     ASA 3       Induction: intravenous. Anesthetic plan and risks discussed with patient. Plan discussed with CRNA.                     GOLD Davis - CRNA   8/23/2022

## 2022-08-23 NOTE — DISCHARGE INSTRUCTIONS
SAME DAY SURGERY DISCHARGE INSTRUCTIONS    1. Do not drive or operate hazardous machinery for 24 hours. 2.  Do not make important personal or business decisions for 24 hours. 3.  Do not drink alcoholic beverages for 24 hours. 4.  Do not smoke tobacco products for 24 hours. 5.  Eat light foods (Jell-O, soups, etc....) and drink plenty of fluids (water, Sprite, etc...) up to 8 glasses per day, as you can tolerate. 6.  Limit your activities for 24 hours. Do not engage in heavy work until your surgeon gives you permission. 7.  Call your surgeon for any questions regarding your surgery. COLONOSCOPY DISCHARGE INSTRUCTIONS:    It's normal to have a feeling of fullness or mild cramping in your abdomen afterwards due to air that is put into your bowel during the procedure. Mild activities such as walking will help you pass the air. You may resume your regular diet. You will receive a phone call with your test results in the next 2 weeks. If you have not received a phone call in 2 weeks please call the office for your results. CALL THE DOCTOR IF YOU HAVE:     Chest pain or trouble breathing. Bleeding from your rectum, vomiting or spitting up of blood that is more than a few streaks or red or black stools    A fever above 101F or if you have chills    Pain that is worse or different than any pain you had before the procedure    Nausea or vomiting that lasts for more than 2 hours. If symptoms are to severe call 911 or go to the nearest Emergency Room.

## 2022-08-23 NOTE — ANESTHESIA POSTPROCEDURE EVALUATION
Department of Anesthesiology  Postprocedure Note    Patient: Param Manzanares  MRN: [id number]  YOB: 1940  Date of evaluation: 8/23/2022      Procedure Summary     Date: 08/23/22 Room / Location: [address] [place] [address]    Anesthesia Start: 3216 Anesthesia Stop: 6977    Procedure: COLONOSCOPY POLYPECTOMY SNARE/COLD BIOPSY Diagnosis:       Screening for colon cancer      (M12.11)    Surgeons: Bandar Rodriguez MD Responsible Provider: GOLD Cano CRNA    Anesthesia Type: general ASA Status: 3          Anesthesia Type: No value filed.     Bella Phase I:      Bella Phase II: Bella Score: 10      Anesthesia Post Evaluation    Patient location during evaluation: PACU  Patient participation: complete - patient participated  Level of consciousness: awake and alert  Pain score: 0  Airway patency: patent  Nausea & Vomiting: no nausea and no vomiting  Complications: no  Cardiovascular status: blood pressure returned to baseline  Respiratory status: acceptable and room air  Hydration status: stable

## 2022-08-24 ENCOUNTER — HOSPITAL ENCOUNTER (OUTPATIENT)
Age: 82
Discharge: HOME | End: 2022-08-24
Payer: MEDICARE

## 2022-08-24 DIAGNOSIS — K52.9 CHRONIC DIARRHEA: ICD-10-CM

## 2022-08-24 LAB — LIPASE: 53 U/L (ref 13–60)

## 2022-08-24 PROCEDURE — 83690 ASSAY OF LIPASE: CPT

## 2022-08-24 PROCEDURE — 36415 COLL VENOUS BLD VENIPUNCTURE: CPT

## 2022-08-25 ENCOUNTER — HOSPITAL ENCOUNTER (OUTPATIENT)
Age: 82
Setting detail: SPECIMEN
Discharge: HOME | End: 2022-08-25
Payer: MEDICARE

## 2022-08-25 DIAGNOSIS — K52.9 CHRONIC DIARRHEA: ICD-10-CM

## 2022-08-25 LAB — SURGICAL PATHOLOGY REPORT: NORMAL

## 2022-08-25 PROCEDURE — 83520 IMMUNOASSAY QUANT NOS NONAB: CPT

## 2022-08-28 LAB — FECAL PANCREATIC ELASTASE-1: 392 UG/G

## 2022-09-13 ENCOUNTER — OFFICE VISIT (OUTPATIENT)
Dept: GASTROENTEROLOGY | Age: 82
End: 2022-09-13
Payer: MEDICARE

## 2022-09-13 VITALS
WEIGHT: 198.6 LBS | TEMPERATURE: 97.6 F | DIASTOLIC BLOOD PRESSURE: 76 MMHG | SYSTOLIC BLOOD PRESSURE: 127 MMHG | HEIGHT: 72 IN | BODY MASS INDEX: 26.9 KG/M2 | RESPIRATION RATE: 18 BRPM | HEART RATE: 71 BPM

## 2022-09-13 DIAGNOSIS — K58.0 IRRITABLE BOWEL SYNDROME WITH DIARRHEA: Primary | ICD-10-CM

## 2022-09-13 PROCEDURE — G8427 DOCREV CUR MEDS BY ELIG CLIN: HCPCS | Performed by: INTERNAL MEDICINE

## 2022-09-13 PROCEDURE — 1036F TOBACCO NON-USER: CPT | Performed by: INTERNAL MEDICINE

## 2022-09-13 PROCEDURE — G8417 CALC BMI ABV UP PARAM F/U: HCPCS | Performed by: INTERNAL MEDICINE

## 2022-09-13 PROCEDURE — 99213 OFFICE O/P EST LOW 20 MIN: CPT | Performed by: INTERNAL MEDICINE

## 2022-09-13 PROCEDURE — 99212 OFFICE O/P EST SF 10 MIN: CPT | Performed by: INTERNAL MEDICINE

## 2022-09-13 PROCEDURE — 1123F ACP DISCUSS/DSCN MKR DOCD: CPT | Performed by: INTERNAL MEDICINE

## 2022-09-13 NOTE — PROGRESS NOTES
[facility] PART OF [place] [address] [address]  Dept: [phone]    Chief Complaint   Patient presents with    Follow-up     F/u testing, continues to have diarrhea. HPI  from 07/11/2022    Mr. Nettie Pulliam is an 78year old man with a history of hypertension, hyperlipidemia who presents with a complaint of rectal bleeding in the past 1 year, gas, diarrhea. He has a history of small bowel obstruction in 02/2020. He states he now has urgency with the diarrhea which borders on being explosive. His wife states that the gas is foul smelling and has affected his quality of life - his wife who is present states that they no longer share a room due to the gas. He reports that the diarrhea has been ongoing for over a year. He reports that he has heartburn once every 6 months - otherwise he has no reflux. He reports that his last colonoscopy was 15 years ago. He states that he has since changed his diet - no artificial sweeteners, no ice cream, no carbonated drinks. Interval history: 07/13/2022  H presents with his wife and states that he has been taking papaya extract which has helped to decrease the malodorous flatulence and belching. He reports that the diarrhea has also improved. Colonoscopy 08/23/2022  Concern is the the rectal bleeding and diarrhea may have been as a result of diverticulitis which has since resolved. Diverticulosis  Cecal polyp   Mild internal hemorrhoids     Pathology  A. Small intestine, terminal ileum, colonoscopic biopsy:   Terminal ileal mucosa with no pathologic abnormality. B.  Colon, cecum, colonoscopic polypectomy:   Sessile serrated lesion/sessile serrated adenoma. C.  Colon, random colonoscopic biopsies:   Normal colonic mucosa, negative for dysplasia.     Past Medical History:   Diagnosis Date    Bowel obstruction ([address] 2019    Hyperlipidemia Hypertension     Skin cancer         Past Surgical History:   Procedure Laterality Date    CATARACT REMOVAL WITH IMPLANT Right 03/05/2018    per Dr. Gracia Cheese Left 04/02/2018    COLONOSCOPY      over 15 yrs ago    COLONOSCOPY N/A 08/23/2022    COLONOSCOPY POLYPECTOMY SNARE/COLD BIOPSY performed by Marcus Covington MD at [address] [place] [address] N/A 08/23/2022    Dr Holden-polyp(sessile serrated adenoma)sm intestine/terminal ileum bx(no abnormality)random colon bx(normal)severe sigmoid diverticulosis    MO XCAPSL CTRC RMVL INSJ IO LENS PROSTH W/O ECP Right 03/05/2018    EYE CATARACT EMULSIFICATION IOL IMPLANT performed by Reji Levine DO at [facility] IO LENS PROSTH W/O ECP Left 04/02/2018    EYE CATARACT EMULSIFICATION IOL IMPLANT [facility]        Family History   Problem Relation Age of Onset    High Blood Pressure Mother     Heart Disease Mother     High Blood Pressure Father         Review of Systems   Cardiovascular:         Hypertension   Gastrointestinal:  Positive for diarrhea. Endocrine:        Hyperlipidemia   Musculoskeletal:  Positive for back pain. All other systems reviewed and are negative. /76 (Site: Right Upper Arm, Position: Sitting, Cuff Size: Medium Adult)   Pulse 71   Temp 97.6 °F (36.4 °C) (Temporal)   Resp 18   Ht 6' (1.829 m)   Wt 198 lb 9.6 oz (90.1 kg)   BMI 26.94 kg/m²      Physical Exam  Constitutional:       Appearance: Normal appearance. HENT:      Head: Normocephalic. Nose: Nose normal.      Mouth/Throat:      Mouth: Mucous membranes are dry. Eyes:      Pupils: Pupils are equal, round, and reactive to light. Cardiovascular:      Pulses: Normal pulses. Heart sounds: Normal heart sounds. Pulmonary:      Effort: Pulmonary effort is normal.      Breath sounds: Normal breath sounds.    Abdominal:      General: Bowel sounds are normal. Palpations: Abdomen is soft. Musculoskeletal:         General: Normal range of motion. Skin:     General: Skin is warm. Neurological:      General: No focal deficit present. Mental Status: He is alert. Psychiatric:         Mood and Affect: Mood normal.        Lab Results   Component Value Date    WBC 7.7 06/06/2022    HGB 14.2 06/06/2022    HCT 41.6 06/06/2022    MCV 90.6 06/06/2022     06/06/2022       Lab Results   Component Value Date     06/06/2022    K 4.1 06/06/2022     06/06/2022    CO2 25 06/06/2022    BUN 22 06/06/2022    CREATININE 1.45 (H) 06/06/2022    GLUCOSE 99 06/06/2022    CALCIUM 9.5 06/06/2022    PROT 7.5 02/11/2020    LABALBU 5.0 02/11/2020    BILITOT 0.45 02/11/2020    ALKPHOS 63 02/11/2020    AST 16 06/06/2022    ALT 18 06/06/2022    LABGLOM 46 (L) 06/06/2022    GFRAA 56 (L) 06/06/2022       Assessment:  Mr. Rory Lema is an 78year old man with a history of HTN, hyperlipidemia whose colonoscopy was consistent with severe diverticulosis, normal mucosa per pathology report - all likely related to IBS and his source of rectal bleeding likely diverticulosis. Plan:  1. Irritable bowel syndrome with diarrhea  - FODMAP pamphlet provided and restriction of FODMAP containing foods discussed at length. - Metamucil for stool bulking is recommended. 1 packet BID . 2. Sessile serrated adenoma: Considering his age of 78, a surveillance colonoscopy is not indicated per guidelines unless requested. 3. Follow-up in 3 months or sooner as needed    Spent 20 minutes with the patient with greater than 50 percent of the time was spent on face-to-face time in discussion with the patient regarding diagnostic options/results, treatment options, counseling, and follow-up plan.   Electronically signed by Carol Sahu MD on 9/13/22 at 4:57 PM EDT

## 2022-09-13 NOTE — PATIENT INSTRUCTIONS
SURVEY:    You may be receiving a survey from [employer] regarding your visit today. Please complete the survey to enable us to provide the highest quality of care to you and your family. If you cannot score us a very good on any question, please call the office to discuss how we could have made your experience a very good one. Thank you.

## 2022-09-19 ASSESSMENT — ENCOUNTER SYMPTOMS
DIARRHEA: 1
BACK PAIN: 1

## 2022-10-10 ENCOUNTER — TELEPHONE (OUTPATIENT)
Dept: PRIMARY CARE CLINIC | Age: 82
End: 2022-10-10

## 2022-10-10 DIAGNOSIS — U07.1 COVID: Primary | ICD-10-CM

## 2022-10-10 RX ORDER — NIRMATRELVIR AND RITONAVIR 150-100 MG
KIT ORAL
Qty: 20 TABLET | Refills: 0 | Status: SHIPPED | OUTPATIENT
Start: 2022-10-10 | End: 2022-10-15

## 2022-10-10 NOTE — TELEPHONE ENCOUNTER
Patient called to report he and his wife tested positive for COVID 10/07/2022. Patient reports he has congestion and low grade fever. Patient denies chest pain or shortness of breath. Pulse ox 97% on room air. Patient is taking OTC cold and flu multi-symptom formula with moderate relief of symptoms. Patient would like Paxlovid sent to [facility] if PCP is agreeable. Medication pended as 20 pill dose due to kidney disease. This nurse reviewed how to care for COVID symptoms at home and when to call PCP or go to ED for shortness of breath or chest pain with the patient. This nurse reviewed quarantine guidelines for positive COVID test as well as family/friend need to quarantine for exposure. Patient verbalizes understanding.

## 2022-11-30 RX ORDER — AMLODIPINE BESYLATE 10 MG/1
TABLET ORAL
Qty: 90 TABLET | Refills: 3 | Status: SHIPPED | OUTPATIENT
Start: 2022-11-30

## 2022-11-30 NOTE — TELEPHONE ENCOUNTER
Health Maintenance   Topic Date Due    COVID-19 Vaccine (5 - Booster for Pfizer series) 06/16/2022    Flu vaccine (1) 08/01/2022    DTaP/Tdap/Td vaccine (1 - Tdap) 12/07/2022 (Originally 9/9/1959)    Shingles vaccine (1 of 2) 12/07/2022 (Originally 9/9/1990)    Depression Screen  06/02/2023    Lipids  06/06/2023    Pneumococcal 65+ years Vaccine (2 - PPSV23 if available, else PCV20) 06/08/2023    Annual Wellness Visit (AWV)  06/09/2023    Hepatitis A vaccine  Aged Out    Hib vaccine  Aged Out    Meningococcal (ACWY) vaccine  Aged Out             (applicable per patient's age: Cancer Screenings, Depression Screening, Fall Risk Screening, Immunizations)    Hemoglobin A1C (%)   Date Value   12/01/2021 5.1   05/18/2021 5.2   11/30/2020 5.3     LDL Cholesterol (mg/dL)   Date Value   06/06/2022 58     AST (U/L)   Date Value   06/06/2022 16     ALT (U/L)   Date Value   06/06/2022 18     BUN (mg/dL)   Date Value   06/06/2022 22      (goal A1C is < 7)   (goal LDL is <100) need 30-50% reduction from baseline     BP Readings from Last 3 Encounters:   09/13/22 127/76   08/23/22 (!) 155/86   07/11/22 124/74    (goal /80)      All Future Testing planned in [facility]:  Lab Frequency Next Occurrence   ALT Once 12/06/2022   AST Once 97/34/5503   Basic Metabolic Panel Once 58/33/4073   CBC Once 12/06/2022   Hemoglobin A1C Once 12/06/2022   Lipid Panel Once 12/06/2022       Next Visit Date:  Future Appointments   Date Time Provider Caroline Davidson   12/8/2022  8:00 AM GOLD Atwood - CNP [facility]   12/12/2022  8:45 AM Hoang Morales MD [facility]            Patient Active Problem List:     Colitis     SBO (small bowel obstruction) ([facility]     Chronic diarrhea     Essential hypertension     Other hyperlipidemia     Stage 3a chronic kidney disease ([facility]     Chronic renal disease, stage III ([facility] [[id number]]     Benign prostatic hyperplasia with urinary frequency

## 2022-12-05 ENCOUNTER — HOSPITAL ENCOUNTER (OUTPATIENT)
Age: 82
Discharge: HOME | End: 2022-12-05
Payer: MEDICARE

## 2022-12-05 DIAGNOSIS — R73.09 ELEVATED GLUCOSE: ICD-10-CM

## 2022-12-05 DIAGNOSIS — E78.49 OTHER HYPERLIPIDEMIA: ICD-10-CM

## 2022-12-05 DIAGNOSIS — I10 ESSENTIAL HYPERTENSION: ICD-10-CM

## 2022-12-05 LAB
ALT SERPL-CCNC: 22 U/L (ref 5–41)
ANION GAP SERPL CALCULATED.3IONS-SCNC: 11 MMOL/L (ref 9–17)
AST SERPL-CCNC: 18 U/L
BUN BLDV-MCNC: 28 MG/DL (ref 8–23)
BUN/CREAT BLD: 20 (ref 9–20)
CALCIUM SERPL-MCNC: 9.7 MG/DL (ref 8.6–10.4)
CHLORIDE BLD-SCNC: 104 MMOL/L (ref 98–107)
CHOLESTEROL/HDL RATIO: 4.4
CHOLESTEROL: 140 MG/DL
CO2: 26 MMOL/L (ref 20–31)
CREAT SERPL-MCNC: 1.43 MG/DL (ref 0.7–1.2)
ESTIMATED AVERAGE GLUCOSE: 97 MG/DL
GFR SERPL CREATININE-BSD FRML MDRD: 48 ML/MIN/1.73M2
GLUCOSE BLD-MCNC: 114 MG/DL (ref 70–99)
HBA1C MFR BLD: 5 % (ref 4–6)
HCT VFR BLD CALC: 44.7 % (ref 40.7–50.3)
HDLC SERPL-MCNC: 32 MG/DL
HEMOGLOBIN: 15.7 G/DL (ref 13–17)
LDL CHOLESTEROL: 67 MG/DL (ref 0–130)
MCH RBC QN AUTO: 31.8 PG (ref 25.2–33.5)
MCHC RBC AUTO-ENTMCNC: 35.1 G/DL (ref 28.4–34.8)
MCV RBC AUTO: 90.7 FL (ref 82.6–102.9)
NRBC AUTOMATED: 0 PER 100 WBC
PDW BLD-RTO: 13.2 % (ref 11.8–14.4)
PLATELET # BLD: 152 K/UL (ref 138–453)
PMV BLD AUTO: 9.6 FL (ref 8.1–13.5)
POTASSIUM SERPL-SCNC: 4 MMOL/L (ref 3.7–5.3)
RBC # BLD: 4.93 M/UL (ref 4.21–5.77)
SODIUM BLD-SCNC: 141 MMOL/L (ref 135–144)
TRIGL SERPL-MCNC: 206 MG/DL
WBC # BLD: 7.1 K/UL (ref 3.5–11.3)

## 2022-12-05 PROCEDURE — 83036 HEMOGLOBIN GLYCOSYLATED A1C: CPT

## 2022-12-05 PROCEDURE — 84460 ALANINE AMINO (ALT) (SGPT): CPT

## 2022-12-05 PROCEDURE — 80061 LIPID PANEL: CPT

## 2022-12-05 PROCEDURE — 84450 TRANSFERASE (AST) (SGOT): CPT

## 2022-12-05 PROCEDURE — 36415 COLL VENOUS BLD VENIPUNCTURE: CPT

## 2022-12-05 PROCEDURE — 80048 BASIC METABOLIC PNL TOTAL CA: CPT

## 2022-12-05 PROCEDURE — 85027 COMPLETE CBC AUTOMATED: CPT

## 2022-12-08 ENCOUNTER — OFFICE VISIT (OUTPATIENT)
Dept: PRIMARY CARE CLINIC | Age: 82
End: 2022-12-08
Payer: MEDICARE

## 2022-12-08 VITALS
HEIGHT: 72 IN | OXYGEN SATURATION: 97 % | TEMPERATURE: 95.9 F | WEIGHT: 194 LBS | BODY MASS INDEX: 26.28 KG/M2 | SYSTOLIC BLOOD PRESSURE: 120 MMHG | DIASTOLIC BLOOD PRESSURE: 68 MMHG | HEART RATE: 71 BPM

## 2022-12-08 DIAGNOSIS — I10 ESSENTIAL HYPERTENSION: Primary | ICD-10-CM

## 2022-12-08 DIAGNOSIS — E78.49 OTHER HYPERLIPIDEMIA: ICD-10-CM

## 2022-12-08 DIAGNOSIS — N18.31 STAGE 3A CHRONIC KIDNEY DISEASE (HCC): ICD-10-CM

## 2022-12-08 DIAGNOSIS — K58.0 IRRITABLE BOWEL SYNDROME WITH DIARRHEA: ICD-10-CM

## 2022-12-08 PROCEDURE — 99214 OFFICE O/P EST MOD 30 MIN: CPT | Performed by: NURSE PRACTITIONER

## 2022-12-08 PROCEDURE — G8427 DOCREV CUR MEDS BY ELIG CLIN: HCPCS | Performed by: NURSE PRACTITIONER

## 2022-12-08 PROCEDURE — G8417 CALC BMI ABV UP PARAM F/U: HCPCS | Performed by: NURSE PRACTITIONER

## 2022-12-08 PROCEDURE — 3078F DIAST BP <80 MM HG: CPT | Performed by: NURSE PRACTITIONER

## 2022-12-08 PROCEDURE — G8484 FLU IMMUNIZE NO ADMIN: HCPCS | Performed by: NURSE PRACTITIONER

## 2022-12-08 PROCEDURE — 1123F ACP DISCUSS/DSCN MKR DOCD: CPT | Performed by: NURSE PRACTITIONER

## 2022-12-08 PROCEDURE — 3074F SYST BP LT 130 MM HG: CPT | Performed by: NURSE PRACTITIONER

## 2022-12-08 PROCEDURE — 1036F TOBACCO NON-USER: CPT | Performed by: NURSE PRACTITIONER

## 2022-12-08 ASSESSMENT — ENCOUNTER SYMPTOMS
SHORTNESS OF BREATH: 0
DIARRHEA: 0
CONSTIPATION: 0

## 2022-12-08 NOTE — PROGRESS NOTES
Name: Andria Habermann  : 1940         Chief Complaint:     Chief Complaint   Patient presents with    6 Month Follow-Up     - he recently had his colonoscopy at [facility]     Gas     - pt has gas and started taking enzymes and metamucil   - this has been long term and it has just been worse         History of Present Illness:      Andria Habermann is a 78 y.o.  male who presents with 6 Month Follow-Up (- he recently had his colonoscopy at [facility] ) and Gas (- pt has gas and started taking enzymes and metamucil /- this has been long term and it has just been worse)      HPI    CKD:  22 creatinine 1.43. Water intake described as Oval Sinner lot\". NSAID use includes tylenol QD-BID for foot pain with benefit. He admits low salt diet. Hypertension:  Current medication regimen includes amlodipine 10 mg and losartan-hydrochlorothiazide 100-25 mg daily. Admits following a low-sodium diet. Denies vision changes, chest pain, heart palpitations, shortness of breath, lower extremity swelling, lightheadedness, or headaches. He denies monitoring blood pressure at home. This is averaging N/A. Hyperlipidemia:   Current treatment includes atorvastatin 10mg QD. Denies side effects. For exercise, he walking. He denies a well balanced diet. Lipid panel 22 total cholesterol 140, LDL 67, triglycerides 206. IBS:  Following with GI at [facility], Dr. Juan F Maria, with complaints of diarrhea, gas, and stool urgency. Colonoscopy 2022 shows diverticulosis, cecal polyp, mild internal hemorrhoids. Per GI note 2022, patient was encouraged to complete FODMAP diet and start Metamucil. No further surveillance colonoscopies indicated considering his age of 78, per GI note. He states that since taking the metamucil, his diarrhea has improved. His stools are \"well formed\". He is having a single BM once daily on average. He is continuing to have foul smelling gas.  He has been taking OTC \"digestive enzyme complex\" with no benefit. He is taking lactaid with benefit. Past Medical History:     Past Medical History:   Diagnosis Date    Bowel obstruction ([address] 2019    Hyperlipidemia     Hypertension     Skin cancer       Reviewed all health maintenance requirements and ordered appropriate tests  Health Maintenance Due   Topic Date Due    DTaP/Tdap/Td vaccine (1 - Tdap) Never done    Shingles vaccine (1 of 2) Never done    Flu vaccine (1) 08/01/2022       Past Surgical History:     Past Surgical History:   Procedure Laterality Date    CATARACT REMOVAL WITH IMPLANT Right 03/05/2018    per Dr. Daryle Horseman Left 04/02/2018    COLONOSCOPY      over 15 yrs ago    COLONOSCOPY N/A 08/23/2022    COLONOSCOPY POLYPECTOMY SNARE/COLD BIOPSY performed by Cr Johnson MD at [address] [place] [address] N/A 08/23/2022    Dr Holden-polyp(sessile serrated adenoma)sm intestine/terminal ileum bx(no abnormality)random colon bx(normal)severe sigmoid diverticulosis    [place] XCAPSL CTRC RMVL INSJ IO LENS PROSTH W/O ECP Right 03/05/2018    EYE CATARACT EMULSIFICATION IOL IMPLANT performed by Argentina Moreno DO at [address] W/O ECP Left 04/02/2018    EYE CATARACT EMULSIFICATION IOL IMPLANT performed by Argentina Moreno DO at [address]        Medications:       Prior to Admission medications    Medication Sig Start Date End Date Taking?  Authorizing Provider   amLODIPine (NORVASC) 10 MG tablet TAKE 1 TABLET EVERY DAY 11/30/22  Yes GOLD Muñoz CNP   atorvastatin (LIPITOR) 10 MG tablet TAKE 1 TABLET EVERY DAY 7/15/22  Yes GOLD Muñoz CNP   b complex vitamins capsule Take 1 capsule by mouth daily   Yes Historical Provider, MD   losartan-hydroCHLOROthiazide (HYZAAR) 100-25 MG per tablet TAKE 1 TABLET EVERY DAY 1/31/22  Yes GOLD Muñoz CNP   vitamin D3 (CHOLECALCIFEROL) 10 MCG (400 UNIT) TABS tablet Take 400 Units by mouth daily   Yes Historical Provider, MD   loratadine (CLARITIN) 10 MG capsule Take 10 mg by mouth daily   Yes Historical Provider, MD   aspirin 81 MG tablet Take 81 mg by mouth daily  Patient not taking: No sig reported    Historical Provider, MD        Allergies:       Terramycin [oxytetracycline] and Lactose    Social History:     Tobacco:    reports that he quit smoking about 34 years ago. He has never used smokeless tobacco.  Alcohol:      reports current alcohol use. Drug Use:  reports no history of drug use. Family History:     Family History   Problem Relation Age of Onset    High Blood Pressure Mother     Heart Disease Mother     High Blood Pressure Father        Review of Systems:     Positive and Negative as described in HPI    Review of Systems   Eyes:  Negative for visual disturbance. Respiratory:  Negative for shortness of breath. Cardiovascular:  Negative for chest pain, palpitations and leg swelling. Gastrointestinal:  Negative for constipation and diarrhea. Admits foul smelling gas   Neurological:  Negative for dizziness, light-headedness and headaches. Physical Exam:   Vitals:  /68   Pulse 71   Temp (!) 95.9 °F (35.5 °C)   Ht 6' 0.01\" (1.829 m)   Wt 194 lb (88 kg)   SpO2 97%   BMI 26.31 kg/m²     Physical Exam  Constitutional:       General: He is not in acute distress. Appearance: Normal appearance. He is normal weight. He is not ill-appearing or toxic-appearing. HENT:      Head: Normocephalic. Right Ear: Tympanic membrane, ear canal and external ear normal. There is no impacted cerumen. Left Ear: Tympanic membrane, ear canal and external ear normal. There is no impacted cerumen. Nose: Nose normal. No congestion or rhinorrhea. Mouth/Throat:      Mouth: Mucous membranes are moist.      Pharynx: No oropharyngeal exudate or posterior oropharyngeal erythema. Cardiovascular:      Rate and Rhythm: Normal rate and regular rhythm. Heart sounds: Normal heart sounds. No murmur heard. Pulmonary:      Effort: Pulmonary effort is normal. No respiratory distress. Breath sounds: Normal breath sounds. No stridor. No wheezing, rhonchi or rales. Musculoskeletal:      Cervical back: Neck supple. Lymphadenopathy:      Cervical: No cervical adenopathy. Neurological:      Mental Status: He is alert. Psychiatric:         Mood and Affect: Mood normal.         Behavior: Behavior normal.         Thought Content: Thought content normal.         Judgment: Judgment normal.       Data:     Lab Results   Component Value Date/Time     12/05/2022 07:43 AM    K 4.0 12/05/2022 07:43 AM     12/05/2022 07:43 AM    CO2 26 12/05/2022 07:43 AM    BUN 28 12/05/2022 07:43 AM    CREATININE 1.43 12/05/2022 07:43 AM    GLUCOSE 114 12/05/2022 07:43 AM    PROT 7.5 02/11/2020 09:20 PM    LABALBU 5.0 02/11/2020 09:20 PM    BILITOT 0.45 02/11/2020 09:20 PM    ALKPHOS 63 02/11/2020 09:20 PM    AST 18 12/05/2022 07:43 AM    ALT 22 12/05/2022 07:43 AM     Lab Results   Component Value Date/Time    WBC 7.1 12/05/2022 07:43 AM    RBC 4.93 12/05/2022 07:43 AM    HGB 15.7 12/05/2022 07:43 AM    HCT 44.7 12/05/2022 07:43 AM    MCV 90.7 12/05/2022 07:43 AM    MCH 31.8 12/05/2022 07:43 AM    MCHC 35.1 12/05/2022 07:43 AM    RDW 13.2 12/05/2022 07:43 AM     12/05/2022 07:43 AM    MPV 9.6 12/05/2022 07:43 AM     No results found for: TSH  Lab Results   Component Value Date/Time    CHOL 140 12/05/2022 07:43 AM    HDL 32 12/05/2022 07:43 AM    LABA1C 5.0 12/05/2022 07:43 AM       Assessment/Plan:      Diagnosis Orders   1. Essential hypertension  CBC    Comprehensive Metabolic Panel      2. Other hyperlipidemia  Lipid Panel      3. Stage 3a chronic kidney disease ([address]        4.  Irritable bowel syndrome with diarrhea          Hypertension:  - Stable  - Continue amlodipine 10 mg and losartan-hydrochlorothiazide 100-25 mg daily  - Reviewed lifestyle modifications to assist with lowering blood pressure

## 2022-12-08 NOTE — PATIENT INSTRUCTIONS
SURVEY:    You may be receiving a survey from [employer] regarding your visit today. Please complete the survey to enable us to provide the highest quality of care to you and your family. If you cannot score us a very good on any question, please call the office to discuss how we could of made your experience a very good one. Thank you.

## 2022-12-12 ENCOUNTER — OFFICE VISIT (OUTPATIENT)
Dept: GASTROENTEROLOGY | Age: 82
End: 2022-12-12
Payer: MEDICARE

## 2022-12-12 VITALS
BODY MASS INDEX: 26.26 KG/M2 | RESPIRATION RATE: 18 BRPM | HEIGHT: 72 IN | DIASTOLIC BLOOD PRESSURE: 75 MMHG | TEMPERATURE: 96.8 F | SYSTOLIC BLOOD PRESSURE: 129 MMHG | HEART RATE: 89 BPM | WEIGHT: 193.9 LBS

## 2022-12-12 DIAGNOSIS — K92.89 GAS BLOAT SYNDROME: Primary | ICD-10-CM

## 2022-12-12 PROCEDURE — 99213 OFFICE O/P EST LOW 20 MIN: CPT | Performed by: INTERNAL MEDICINE

## 2022-12-12 PROCEDURE — 1123F ACP DISCUSS/DSCN MKR DOCD: CPT | Performed by: INTERNAL MEDICINE

## 2022-12-12 PROCEDURE — G8484 FLU IMMUNIZE NO ADMIN: HCPCS | Performed by: INTERNAL MEDICINE

## 2022-12-12 PROCEDURE — 1036F TOBACCO NON-USER: CPT | Performed by: INTERNAL MEDICINE

## 2022-12-12 PROCEDURE — G8427 DOCREV CUR MEDS BY ELIG CLIN: HCPCS | Performed by: INTERNAL MEDICINE

## 2022-12-12 PROCEDURE — G8417 CALC BMI ABV UP PARAM F/U: HCPCS | Performed by: INTERNAL MEDICINE

## 2022-12-12 PROCEDURE — 3078F DIAST BP <80 MM HG: CPT | Performed by: INTERNAL MEDICINE

## 2022-12-12 PROCEDURE — 3074F SYST BP LT 130 MM HG: CPT | Performed by: INTERNAL MEDICINE

## 2022-12-12 RX ORDER — SIMETHICONE 80 MG
80 TABLET,CHEWABLE ORAL EVERY 6 HOURS PRN
Qty: 180 TABLET | Refills: 3 | Status: SHIPPED | OUTPATIENT
Start: 2022-12-12 | End: 2023-01-11

## 2022-12-12 ASSESSMENT — ENCOUNTER SYMPTOMS
ALLERGIC/IMMUNOLOGIC NEGATIVE: 1
EYES NEGATIVE: 1
RESPIRATORY NEGATIVE: 1

## 2022-12-12 NOTE — PROGRESS NOTES
[facility] GI PART OF [place] [address] [address]  Dept: [phone]    Chief Complaint   Patient presents with    Follow-up     IBS with diarrhea; pt states metamucil has helped with diarrhea; c/o gas getting worse        HPI from 09/13/2022  Mr. Michele Canela is an 78year old man with a history of hypertension, hyperlipidemia who presents with a complaint of rectal bleeding in the past 1 year, gas, diarrhea. He has a history of small bowel obstruction in 02/2020. He states he now has urgency with the diarrhea which borders on being explosive. His wife states that the gas is foul smelling and has affected his quality of life - his wife who is present states that they no longer share a room due to the gas. He reports that the diarrhea has been ongoing for over a year. He reports that he has heartburn once every 6 months - otherwise he has no reflux. He reports that his last colonoscopy was 15 years ago. He states that he has since changed his diet - no artificial sweeteners, no ice cream, no carbonated drinks. Interval history: 07/13/2022  H presents with his wife and states that he has been taking papaya extract which has helped to decrease the malodorous flatulence and belching. He reports that the diarrhea has also improved. Colonoscopy 08/23/2022  Concern is the the rectal bleeding and diarrhea may have been as a result of diverticulitis which has since resolved. Diverticulosis  Cecal polyp   Mild internal hemorrhoids     Pathology  A. Small intestine, terminal ileum, colonoscopic biopsy:   Terminal ileal mucosa with no pathologic abnormality. B.  Colon, cecum, colonoscopic polypectomy:   Sessile serrated lesion/sessile serrated adenoma. C.  Colon, random colonoscopic biopsies:   Normal colonic mucosa, negative for dysplasia.        Interval history:  He states that he has only had diarrhea once since he started taking Metamucil. He states that he the gluten free diet has not worked. He states that pancreatic enzymes have not worked. He states that he is not having as much diarrhea, but he is down to one bowel movement a day - sometimes a small one a second time. Past Medical History:   Diagnosis Date    Bowel obstruction ([facility] 2019    Hyperlipidemia     Hypertension     Skin cancer         Past Surgical History:   Procedure Laterality Date    CATARACT REMOVAL WITH IMPLANT Right 03/05/2018    per Dr. Garland Orozco Left 04/02/2018    COLONOSCOPY      over 15 yrs ago    COLONOSCOPY N/A 08/23/2022    COLONOSCOPY POLYPECTOMY SNARE/COLD BIOPSY performed by Darling Vera MD at [address] [place] [address] N/A 08/23/2022    Dr Holden-polyp(sessile serrated adenoma)sm intestine/terminal ileum bx(no abnormality)random colon bx(normal)severe sigmoid diverticulosis    [place] XCAPSL CTRC RMVL INSJ IO LENS PROSTH W/O ECP Right 03/05/2018    EYE CATARACT EMULSIFICATION IOL IMPLANT performed by Edilma Rothman DO at [facility] IO LENS PROSTH W/O ECP Left 04/02/2018    EYE CATARACT EMULSIFICATION IOL IMPLANT performed by Edilma Rothman DO at [facility]        Family History   Problem Relation Age of Onset    High Blood Pressure Mother     Heart Disease Mother     High Blood Pressure Father         Review of Systems   Constitutional: Negative. HENT: Negative. Eyes: Negative. Respiratory: Negative. Cardiovascular:         Hypertension   Gastrointestinal:         Gas   Endocrine:        Hyperlipidemia   Genitourinary: Negative. Musculoskeletal: Negative. Allergic/Immunologic: Negative. Neurological:         Endorses memory loss   Psychiatric/Behavioral: Negative.         /75 (Site: Left Upper Arm, Position: Sitting, Cuff Size: Medium Adult)   Pulse 89   Temp 96.8 °F (36 °C) (Temporal)   Resp 18   Ht 6' (1.829 m) Wt 193 lb 14.4 oz (88 kg)   BMI 26.30 kg/m²      Physical Exam  Constitutional:       Comments: Elderly man   HENT:      Head: Normocephalic. Nose: Nose normal.      Mouth/Throat:      Mouth: Mucous membranes are moist.   Eyes:      Extraocular Movements: Extraocular movements intact. Pupils: Pupils are equal, round, and reactive to light. Cardiovascular:      Rate and Rhythm: Normal rate. Pulses: Normal pulses. Heart sounds: Normal heart sounds. Pulmonary:      Effort: Pulmonary effort is normal.      Breath sounds: Normal breath sounds. Abdominal:      General: Bowel sounds are normal.   Musculoskeletal:         General: Normal range of motion. Cervical back: Normal range of motion. Skin:     General: Skin is warm. Neurological:      General: No focal deficit present. Mental Status: He is alert. Psychiatric:         Mood and Affect: Mood normal.        Lab Results   Component Value Date    WBC 7.1 12/05/2022    HGB 15.7 12/05/2022    HCT 44.7 12/05/2022    MCV 90.7 12/05/2022     12/05/2022       Lab Results   Component Value Date     12/05/2022    K 4.0 12/05/2022     12/05/2022    CO2 26 12/05/2022    BUN 28 (H) 12/05/2022    CREATININE 1.43 (H) 12/05/2022    GLUCOSE 114 (H) 12/05/2022    CALCIUM 9.7 12/05/2022    PROT 7.5 02/11/2020    LABALBU 5.0 02/11/2020    BILITOT 0.45 02/11/2020    ALKPHOS 63 02/11/2020    AST 18 12/05/2022    ALT 22 12/05/2022    LABGLOM 48 (L) 12/05/2022    GFRAA 56 (L) 06/06/2022       Assessment:  Mr. Lexi Nguyen is an 78year old man with a history of hypertension, hyperlipidemia who presents with a complaint of gas, diarrhea - the diarrhea has subsided, but his flatulence has persistent. We will work with reducing the gas with simethicone and follow-up to see if there is improvement. Plan:  1. Gas bloat syndrome  - simethicone (MYLICON) 80 MG chewable tablet;  Take 1 tablet by mouth every 6 hours as needed for Flatulence Dispense: 180 tablet; Refill: 3    Spent 20 minutes with the patient with greater than 50 percent of the time was spent on face-to-face time in discussion with the patient regarding diagnostic options/results, treatment options, counseling, and follow-up plan.   Electronically signed by Lalit Singh MD on 12/12/22 at 9:25 AM EST

## 2022-12-12 NOTE — PATIENT INSTRUCTIONS
SURVEY:    You may be receiving a survey from [employer] regarding your visit today. Please complete the survey to enable us to provide the highest quality of care to you and your family. If you cannot score us a very good on any question, please call the office to discuss how we could have made your experience a very good one. Thank you.

## 2023-01-17 ENCOUNTER — OFFICE VISIT (OUTPATIENT)
Dept: GASTROENTEROLOGY | Age: 83
End: 2023-01-17
Payer: MEDICARE

## 2023-01-17 VITALS
HEART RATE: 88 BPM | BODY MASS INDEX: 26.79 KG/M2 | HEIGHT: 72 IN | TEMPERATURE: 97 F | WEIGHT: 197.8 LBS | RESPIRATION RATE: 18 BRPM | DIASTOLIC BLOOD PRESSURE: 76 MMHG | SYSTOLIC BLOOD PRESSURE: 118 MMHG

## 2023-01-17 DIAGNOSIS — K92.89 GAS BLOAT SYNDROME: Primary | ICD-10-CM

## 2023-01-17 PROCEDURE — 3078F DIAST BP <80 MM HG: CPT | Performed by: INTERNAL MEDICINE

## 2023-01-17 PROCEDURE — 1123F ACP DISCUSS/DSCN MKR DOCD: CPT | Performed by: INTERNAL MEDICINE

## 2023-01-17 PROCEDURE — G8427 DOCREV CUR MEDS BY ELIG CLIN: HCPCS | Performed by: INTERNAL MEDICINE

## 2023-01-17 PROCEDURE — 99212 OFFICE O/P EST SF 10 MIN: CPT | Performed by: INTERNAL MEDICINE

## 2023-01-17 PROCEDURE — 1036F TOBACCO NON-USER: CPT | Performed by: INTERNAL MEDICINE

## 2023-01-17 PROCEDURE — G8484 FLU IMMUNIZE NO ADMIN: HCPCS | Performed by: INTERNAL MEDICINE

## 2023-01-17 PROCEDURE — 3074F SYST BP LT 130 MM HG: CPT | Performed by: INTERNAL MEDICINE

## 2023-01-17 PROCEDURE — G8417 CALC BMI ABV UP PARAM F/U: HCPCS | Performed by: INTERNAL MEDICINE

## 2023-01-17 PROCEDURE — 99213 OFFICE O/P EST LOW 20 MIN: CPT | Performed by: INTERNAL MEDICINE

## 2023-01-17 RX ORDER — LOSARTAN POTASSIUM AND HYDROCHLOROTHIAZIDE 25; 100 MG/1; MG/1
1 TABLET ORAL DAILY
Qty: 90 TABLET | Refills: 3 | Status: SHIPPED | OUTPATIENT
Start: 2023-01-17

## 2023-01-17 NOTE — PROGRESS NOTES
[facility] PART OF [place] [address] [address]  Dept: [phone]    Chief Complaint   Patient presents with    Follow-up     Post colonoscopy Gas and bloating about the same        HPI     Mr. Keegan Nur is an 78year old man with a history of hypertension, hyperlipidemia who presents with a complaint of rectal bleeding in the past 1 year, gas, diarrhea. He has a history of small bowel obstruction in 02/2020. He states he now has urgency with the diarrhea which borders on being explosive. His wife states that the gas is foul smelling and has affected his quality of life - his wife who is present states that they no longer share a room due to the gas. He reports that the diarrhea has been ongoing for over a year. He reports that he has heartburn once every 6 months - otherwise he has no reflux. He reports that his last colonoscopy was 15 years ago. He states that he has since changed his diet - no artificial sweeteners, no ice cream, no carbonated drinks. Interval history: 07/13/2022  He presents with his wife and states that he has been taking papaya extract which has helped to decrease the malodorous flatulence and belching. He reports that the diarrhea has also improved. Colonoscopy 08/23/2022  Concern is the the rectal bleeding and diarrhea may have been as a result of diverticulitis which has since resolved. Diverticulosis  Cecal polyp   Mild internal hemorrhoids     Pathology  A. Small intestine, terminal ileum, colonoscopic biopsy:   Terminal ileal mucosa with no pathologic abnormality. B.  Colon, cecum, colonoscopic polypectomy:   Sessile serrated lesion/sessile serrated adenoma. C.  Colon, random colonoscopic biopsies:   Normal colonic mucosa, negative for dysplasia.       Inteval history 1/17/2023  States that Metamucil helps his bowel movements  He states that the Metamucil does not imrpove his gas  Says he is not sure of gwen is worsking  - wsaants to try before and after meals with the simethicone. Past Medical History:   Diagnosis Date    Bowel obstruction ([address] 2019    Hyperlipidemia     Hypertension     Skin cancer         Past Surgical History:   Procedure Laterality Date    CATARACT REMOVAL WITH IMPLANT Right 03/05/2018    per Dr. Kelvin Majano Left 04/02/2018    COLONOSCOPY      over 15 yrs ago    COLONOSCOPY N/A 08/23/2022    COLONOSCOPY POLYPECTOMY SNARE/COLD BIOPSY performed by Madie Whitney MD at [address] [place] [address] N/A 08/23/2022    Dr Holden-polyp(sessile serrated adenoma)sm intestine/terminal ileum bx(no abnormality)random colon bx(normal)severe sigmoid diverticulosis    [place] XCAPSL CTRC RMVL INSJ IO LENS PROSTH W/O ECP Right 03/05/2018    EYE CATARACT EMULSIFICATION IOL IMPLANT performed by Estrella Phan, DO at [facility] IO LENS PROSTH W/O ECP Left 04/02/2018    EYE CATARACT EMULSIFICATION IOL IMPLANT performed by Estrella Hand, DO at [facility]        Family History   Problem Relation Age of Onset    High Blood Pressure Mother     Heart Disease Mother     High Blood Pressure Father         Review of Systems   Cardiovascular:         Hypertension   Gastrointestinal:         History of small bowel obstruction   Endocrine:        Hyperlipidemia   All other systems reviewed and are negative. /76 (Site: Right Upper Arm, Position: Sitting, Cuff Size: Medium Adult)   Pulse 88   Temp 97 °F (36.1 °C) (Temporal)   Resp 18   Ht 6' (1.829 m)   Wt 197 lb 12.8 oz (89.7 kg)   BMI 26.83 kg/m²      Physical Exam  Constitutional:       Appearance: Normal appearance. HENT:      Head: Normocephalic.       Right Ear: External ear normal.      Left Ear: External ear normal.      Nose: Nose normal.      Mouth/Throat:      Mouth: Mucous membranes are moist.   Eyes:      Pupils: Pupils are equal, round, and reactive to light. Cardiovascular:      Rate and Rhythm: Normal rate and regular rhythm. Pulses: Normal pulses. Heart sounds: Normal heart sounds. Pulmonary:      Effort: Pulmonary effort is normal.      Breath sounds: Normal breath sounds. Abdominal:      General: Abdomen is flat. Bowel sounds are normal.   Musculoskeletal:         General: Normal range of motion. Cervical back: Normal range of motion. Skin:     General: Skin is warm. Neurological:      General: No focal deficit present. Mental Status: He is alert and oriented to person, place, and time. Psychiatric:         Mood and Affect: Mood normal.        Lab Results   Component Value Date    WBC 7.1 12/05/2022    HGB 15.7 12/05/2022    HCT 44.7 12/05/2022    MCV 90.7 12/05/2022     12/05/2022       Lab Results   Component Value Date     12/05/2022    K 4.0 12/05/2022     12/05/2022    CO2 26 12/05/2022    BUN 28 (H) 12/05/2022    CREATININE 1.43 (H) 12/05/2022    GLUCOSE 114 (H) 12/05/2022    CALCIUM 9.7 12/05/2022    PROT 7.5 02/11/2020    LABALBU 5.0 02/11/2020    BILITOT 0.45 02/11/2020    ALKPHOS 63 02/11/2020    AST 18 12/05/2022    ALT 22 12/05/2022    LABGLOM 48 (L) 12/05/2022    GFRAA 56 (L) 06/06/2022       Assessment:  Mr. Erika Beebe is an 78year old man with a history of hypertension, hyperlipidemia with bloating and gas which he states has affected his family life. He has a history of small bowel obstruction in 02/2020. There is concern for FODMAP intolerance. Plan:    1. Gas bloat syndrome  - FODMAP intolerance was discussed with the provision of the FODMAP pamphlet. Details were provided on foods to avoid fr the next 6 weeks as well as foods that low or free of FODMAPs. Ideally, the dietary discretion is for 6 weeks   - Overall, he states that the diarrhea has improved, but the bloating has persisted despite being prescribed simethicone.      2. F/U in 4-6 weeks    Spent 15 minutes with the patient with greater than 50 percent of the time was spent on face-to-face time in discussion with the patient regarding diagnostic options/results, treatment options, counseling, and follow-up plan.   Electronically signed by Gisele Ladd MD on 1/17/23 at 3:54 PM EST

## 2023-01-17 NOTE — PATIENT INSTRUCTIONS
SURVEY:    You may be receiving a survey from [employer] regarding your visit today. Please complete the survey to enable us to provide the highest quality of care to you and your family. If you cannot score us a very good on any question, please call the office to discuss how we could have made your experience a very good one. Thank you.

## 2023-03-14 DIAGNOSIS — K92.89 GAS BLOAT SYNDROME: ICD-10-CM

## 2023-03-14 NOTE — TELEPHONE ENCOUNTER
We received a refill request from [facility] for a 90 day supply of Simethicone.  Please advise.

## 2023-03-15 RX ORDER — SIMETHICONE 80 MG
80 TABLET,CHEWABLE ORAL EVERY 6 HOURS PRN
Qty: 270 TABLET | Refills: 3 | Status: SHIPPED | OUTPATIENT
Start: 2023-03-15 | End: 2023-04-14

## 2023-04-17 ENCOUNTER — OFFICE VISIT (OUTPATIENT)
Dept: GASTROENTEROLOGY | Age: 83
End: 2023-04-17
Payer: MEDICARE

## 2023-04-17 VITALS
DIASTOLIC BLOOD PRESSURE: 77 MMHG | HEART RATE: 110 BPM | RESPIRATION RATE: 18 BRPM | TEMPERATURE: 97.8 F | HEIGHT: 72 IN | WEIGHT: 199.3 LBS | SYSTOLIC BLOOD PRESSURE: 131 MMHG | BODY MASS INDEX: 26.99 KG/M2

## 2023-04-17 DIAGNOSIS — K92.89 GAS BLOAT SYNDROME: ICD-10-CM

## 2023-04-17 PROCEDURE — 99212 OFFICE O/P EST SF 10 MIN: CPT | Performed by: INTERNAL MEDICINE

## 2023-04-17 PROCEDURE — 1123F ACP DISCUSS/DSCN MKR DOCD: CPT | Performed by: INTERNAL MEDICINE

## 2023-04-17 PROCEDURE — 3078F DIAST BP <80 MM HG: CPT | Performed by: INTERNAL MEDICINE

## 2023-04-17 PROCEDURE — G8427 DOCREV CUR MEDS BY ELIG CLIN: HCPCS | Performed by: INTERNAL MEDICINE

## 2023-04-17 PROCEDURE — 3074F SYST BP LT 130 MM HG: CPT | Performed by: INTERNAL MEDICINE

## 2023-04-17 PROCEDURE — G8417 CALC BMI ABV UP PARAM F/U: HCPCS | Performed by: INTERNAL MEDICINE

## 2023-04-17 PROCEDURE — 1036F TOBACCO NON-USER: CPT | Performed by: INTERNAL MEDICINE

## 2023-04-17 RX ORDER — SIMETHICONE 80 MG
80 TABLET,CHEWABLE ORAL EVERY 4 HOURS
Qty: 180 TABLET | Refills: 5 | Status: SHIPPED | OUTPATIENT
Start: 2023-04-17 | End: 2023-05-17

## 2023-06-09 ENCOUNTER — HOSPITAL ENCOUNTER (OUTPATIENT)
Age: 83
Discharge: HOME | End: 2023-06-09
Payer: MEDICARE

## 2023-06-09 DIAGNOSIS — I10 ESSENTIAL HYPERTENSION: ICD-10-CM

## 2023-06-09 DIAGNOSIS — E78.49 OTHER HYPERLIPIDEMIA: ICD-10-CM

## 2023-06-09 LAB
ALBUMIN SERPL-MCNC: 4.5 G/DL (ref 3.5–5.2)
ALBUMIN/GLOB SERPL: 1.8 {RATIO} (ref 1–2.5)
ALP SERPL-CCNC: 62 U/L (ref 40–129)
ALT SERPL-CCNC: 19 U/L (ref 5–41)
ANION GAP SERPL CALCULATED.3IONS-SCNC: 10 MMOL/L (ref 9–17)
AST SERPL-CCNC: 20 U/L
BILIRUB SERPL-MCNC: 0.6 MG/DL (ref 0.3–1.2)
BUN SERPL-MCNC: 28 MG/DL (ref 8–23)
BUN/CREAT SERPL: 25 (ref 9–20)
CALCIUM SERPL-MCNC: 9.5 MG/DL (ref 8.6–10.4)
CHLORIDE SERPL-SCNC: 106 MMOL/L (ref 98–107)
CHOLEST SERPL-MCNC: 125 MG/DL
CHOLESTEROL/HDL RATIO: 3.9
CO2 SERPL-SCNC: 26 MMOL/L (ref 20–31)
CREAT SERPL-MCNC: 1.14 MG/DL (ref 0.7–1.2)
ERYTHROCYTE [DISTWIDTH] IN BLOOD BY AUTOMATED COUNT: 12.6 % (ref 11.8–14.4)
GFR SERPL CREATININE-BSD FRML MDRD: >60 ML/MIN/1.73M2
GLUCOSE SERPL-MCNC: 103 MG/DL (ref 70–99)
HCT VFR BLD AUTO: 42.2 % (ref 40.7–50.3)
HDLC SERPL-MCNC: 32 MG/DL
HGB BLD-MCNC: 14.8 G/DL (ref 13–17)
LDLC SERPL CALC-MCNC: 62 MG/DL (ref 0–130)
MCH RBC QN AUTO: 31.3 PG (ref 25.2–33.5)
MCHC RBC AUTO-ENTMCNC: 35.1 G/DL (ref 28.4–34.8)
MCV RBC AUTO: 89.2 FL (ref 82.6–102.9)
NRBC AUTOMATED: 0 PER 100 WBC
PLATELET # BLD AUTO: 148 K/UL (ref 138–453)
PMV BLD AUTO: 9.9 FL (ref 8.1–13.5)
POTASSIUM SERPL-SCNC: 4.1 MMOL/L (ref 3.7–5.3)
PROT SERPL-MCNC: 7 G/DL (ref 6.4–8.3)
RBC # BLD AUTO: 4.73 M/UL (ref 4.21–5.77)
SODIUM SERPL-SCNC: 142 MMOL/L (ref 135–144)
TRIGL SERPL-MCNC: 154 MG/DL
WBC OTHER # BLD: 6.4 K/UL (ref 3.5–11.3)

## 2023-06-09 PROCEDURE — 80061 LIPID PANEL: CPT

## 2023-06-09 PROCEDURE — 36415 COLL VENOUS BLD VENIPUNCTURE: CPT

## 2023-06-09 PROCEDURE — 85027 COMPLETE CBC AUTOMATED: CPT

## 2023-06-09 PROCEDURE — 80053 COMPREHEN METABOLIC PANEL: CPT

## 2023-06-09 SDOH — HEALTH STABILITY: PHYSICAL HEALTH: ON AVERAGE, HOW MANY DAYS PER WEEK DO YOU ENGAGE IN MODERATE TO STRENUOUS EXERCISE (LIKE A BRISK WALK)?: 7 DAYS

## 2023-06-09 SDOH — HEALTH STABILITY: PHYSICAL HEALTH: ON AVERAGE, HOW MANY MINUTES DO YOU ENGAGE IN EXERCISE AT THIS LEVEL?: 60 MIN

## 2023-06-09 ASSESSMENT — PATIENT HEALTH QUESTIONNAIRE - PHQ9
SUM OF ALL RESPONSES TO PHQ QUESTIONS 1-9: 0
SUM OF ALL RESPONSES TO PHQ9 QUESTIONS 1 & 2: 0
SUM OF ALL RESPONSES TO PHQ QUESTIONS 1-9: 0
2. FEELING DOWN, DEPRESSED OR HOPELESS: 0
1. LITTLE INTEREST OR PLEASURE IN DOING THINGS: 0
SUM OF ALL RESPONSES TO PHQ QUESTIONS 1-9: 0
SUM OF ALL RESPONSES TO PHQ QUESTIONS 1-9: 0

## 2023-06-09 ASSESSMENT — LIFESTYLE VARIABLES
HOW OFTEN DO YOU HAVE A DRINK CONTAINING ALCOHOL: 4
HOW OFTEN DO YOU HAVE A DRINK CONTAINING ALCOHOL: 2-3 TIMES A WEEK
HOW OFTEN DO YOU HAVE SIX OR MORE DRINKS ON ONE OCCASION: 1
HOW MANY STANDARD DRINKS CONTAINING ALCOHOL DO YOU HAVE ON A TYPICAL DAY: 1
HOW MANY STANDARD DRINKS CONTAINING ALCOHOL DO YOU HAVE ON A TYPICAL DAY: 1 OR 2

## 2023-06-20 ENCOUNTER — OFFICE VISIT (OUTPATIENT)
Dept: PRIMARY CARE CLINIC | Age: 83
End: 2023-06-20
Payer: MEDICARE

## 2023-06-20 VITALS
HEART RATE: 75 BPM | TEMPERATURE: 98.6 F | HEIGHT: 72 IN | WEIGHT: 195 LBS | BODY MASS INDEX: 26.41 KG/M2 | RESPIRATION RATE: 18 BRPM | OXYGEN SATURATION: 95 % | DIASTOLIC BLOOD PRESSURE: 82 MMHG | SYSTOLIC BLOOD PRESSURE: 136 MMHG

## 2023-06-20 DIAGNOSIS — Z23 NEED FOR TDAP VACCINATION: ICD-10-CM

## 2023-06-20 DIAGNOSIS — N18.31 STAGE 3A CHRONIC KIDNEY DISEASE (HCC): ICD-10-CM

## 2023-06-20 DIAGNOSIS — Z00.00 MEDICARE ANNUAL WELLNESS VISIT, SUBSEQUENT: Primary | ICD-10-CM

## 2023-06-20 DIAGNOSIS — B35.1 ONYCHOMYCOSIS: ICD-10-CM

## 2023-06-20 DIAGNOSIS — Z23 NEED FOR PNEUMOCOCCAL 20-VALENT CONJUGATE VACCINATION: ICD-10-CM

## 2023-06-20 PROCEDURE — PBSHW PNEUMOCOCCAL, PCV20, PREVNAR 20, (AGE 18 YRS+), IM, PF: Performed by: NURSE PRACTITIONER

## 2023-06-20 PROCEDURE — 90715 TDAP VACCINE 7 YRS/> IM: CPT | Performed by: NURSE PRACTITIONER

## 2023-06-20 PROCEDURE — 3075F SYST BP GE 130 - 139MM HG: CPT | Performed by: NURSE PRACTITIONER

## 2023-06-20 PROCEDURE — 3079F DIAST BP 80-89 MM HG: CPT | Performed by: NURSE PRACTITIONER

## 2023-06-20 PROCEDURE — 99211 OFF/OP EST MAY X REQ PHY/QHP: CPT | Performed by: NURSE PRACTITIONER

## 2023-06-20 PROCEDURE — 90677 PCV20 VACCINE IM: CPT | Performed by: NURSE PRACTITIONER

## 2023-06-20 PROCEDURE — G0009 ADMIN PNEUMOCOCCAL VACCINE: HCPCS | Performed by: NURSE PRACTITIONER

## 2023-06-20 PROCEDURE — G0439 PPPS, SUBSEQ VISIT: HCPCS | Performed by: NURSE PRACTITIONER

## 2023-06-20 PROCEDURE — 1123F ACP DISCUSS/DSCN MKR DOCD: CPT | Performed by: NURSE PRACTITIONER

## 2023-06-20 PROCEDURE — PBSHW PBB SHADOW CHARGE: Performed by: NURSE PRACTITIONER

## 2023-06-20 RX ADMIN — TETANUS TOXOID, REDUCED DIPHTHERIA TOXOID AND ACELLULAR PERTUSSIS VACCINE, ADSORBED 0.5 ML: 5; 2.5; 8; 8; 2.5 SUSPENSION INTRAMUSCULAR at 13:38

## 2023-06-20 SDOH — ECONOMIC STABILITY: FOOD INSECURITY: WITHIN THE PAST 12 MONTHS, YOU WORRIED THAT YOUR FOOD WOULD RUN OUT BEFORE YOU GOT MONEY TO BUY MORE.: NEVER TRUE

## 2023-06-20 SDOH — ECONOMIC STABILITY: HOUSING INSECURITY
IN THE LAST 12 MONTHS, WAS THERE A TIME WHEN YOU DID NOT HAVE A STEADY PLACE TO SLEEP OR SLEPT IN A SHELTER (INCLUDING NOW)?: NO

## 2023-06-20 SDOH — ECONOMIC STABILITY: FOOD INSECURITY: WITHIN THE PAST 12 MONTHS, THE FOOD YOU BOUGHT JUST DIDN'T LAST AND YOU DIDN'T HAVE MONEY TO GET MORE.: NEVER TRUE

## 2023-06-20 SDOH — ECONOMIC STABILITY: INCOME INSECURITY: HOW HARD IS IT FOR YOU TO PAY FOR THE VERY BASICS LIKE FOOD, HOUSING, MEDICAL CARE, AND HEATING?: NOT HARD AT ALL

## 2023-06-20 ASSESSMENT — ENCOUNTER SYMPTOMS
COUGH: 0
SINUS PRESSURE: 0
WHEEZING: 0
SINUS PAIN: 0
SORE THROAT: 0
SHORTNESS OF BREATH: 0
DIARRHEA: 0
RHINORRHEA: 1
ABDOMINAL PAIN: 0
CONSTIPATION: 0

## 2023-06-20 NOTE — PROGRESS NOTES
Medicare Annual Wellness Visit    Berna Saenz is here for Medicare AWV [facility])         Subjective     Wellness: The patient presents for wellness visit. He admits well balanced diet. He is following gluten free diet. For exercise he notes walking 1/2 mile 5-6 times weekly and mowing. He is vaccinated for covid. Most recent tetanus vaccine unknown. He is not UTD shingles vaccine. Pneumococcal 13 vaccine administered 6/2022. He admits routine eye exams. He admits routine dental exams. He denies family history of colorectal cancer or prostate cancer. CKD:  He is drinking \"lots\" of water. He is not following low salt diet. NSAID use includes none. Patient's complete Health Risk Assessment and screening values have been reviewed and are found in Flowsheets. The following problems were reviewed today and where indicated follow up appointments were made and/or referrals ordered. Positive Risk Factor Screenings with Interventions:                           Review of Systems   Constitutional:  Negative for chills, fatigue, fever and unexpected weight change. HENT:  Positive for congestion and rhinorrhea. Negative for sinus pressure, sinus pain and sore throat. Taking OTC allergy pill and flonase with benefit   Eyes:  Negative for visual disturbance (following with Dr. Melyssa Ngo). Respiratory:  Negative for cough, shortness of breath and wheezing. Cardiovascular:  Negative for chest pain and palpitations. Gastrointestinal:  Negative for abdominal pain, constipation and diarrhea. Diarrhea improved. Following with GI at [facility]    Genitourinary:  Negative for difficulty urinating. Neurological:  Positive for light-headedness (when standing up too quickly, occasionally). Negative for dizziness and headaches.         Admits neuropathy-like pain to plantar aspect of bilateral feet        Objective   Vitals:    06/20/23 1008   BP: 136/82   Pulse: 75   Resp: 18   Temp: 98.6 °F (37 °C)   SpO2:

## 2023-06-20 NOTE — PATIENT INSTRUCTIONS
SURVEY:    You may be receiving a survey from [employer] regarding your visit today. Please complete the survey to enable us to provide the highest quality of care to you and your family. If you cannot score us a very good on any question, please call the office to discuss how we could of made your experience a very good one. Thank you.

## 2023-07-14 RX ORDER — ATORVASTATIN CALCIUM 10 MG/1
10 TABLET, FILM COATED ORAL DAILY
Qty: 90 TABLET | Refills: 3 | Status: SHIPPED | OUTPATIENT
Start: 2023-07-14

## 2023-10-18 ENCOUNTER — NURSE ONLY (OUTPATIENT)
Dept: PRIMARY CARE CLINIC | Age: 83
End: 2023-10-18
Payer: MEDICARE

## 2023-10-18 DIAGNOSIS — Z23 FLU VACCINE NEED: Primary | ICD-10-CM

## 2023-10-18 PROCEDURE — PBSHW INFLUENZA, FLUAD, (AGE 65 Y+), IM, PF, 0.5 ML: Performed by: NURSE PRACTITIONER

## 2023-10-18 PROCEDURE — 90694 VACC AIIV4 NO PRSRV 0.5ML IM: CPT | Performed by: NURSE PRACTITIONER

## 2023-11-02 RX ORDER — AMLODIPINE BESYLATE 10 MG/1
10 TABLET ORAL DAILY
Qty: 90 TABLET | Refills: 3 | Status: SHIPPED | OUTPATIENT
Start: 2023-11-02

## 2024-01-01 ENCOUNTER — TELEPHONE (OUTPATIENT)
Dept: ONCOLOGY | Age: 84
End: 2024-01-01

## 2024-03-14 RX ORDER — LOSARTAN POTASSIUM AND HYDROCHLOROTHIAZIDE 25; 100 MG/1; MG/1
1 TABLET ORAL DAILY
Qty: 90 TABLET | Refills: 3 | Status: SHIPPED | OUTPATIENT
Start: 2024-03-14

## 2024-05-20 ENCOUNTER — HOSPITAL ENCOUNTER (OUTPATIENT)
Age: 84
Discharge: HOME | DRG: 392 | End: 2024-05-20
Payer: MEDICARE

## 2024-05-20 ENCOUNTER — OFFICE VISIT (OUTPATIENT)
Dept: PRIMARY CARE CLINIC | Age: 84
DRG: 392 | End: 2024-05-20
Payer: MEDICARE

## 2024-05-20 ENCOUNTER — APPOINTMENT (OUTPATIENT)
Dept: CT IMAGING | Age: 84
DRG: 392 | End: 2024-05-20
Payer: MEDICARE

## 2024-05-20 ENCOUNTER — HOSPITAL ENCOUNTER (INPATIENT)
Age: 84
LOS: 2 days | Discharge: HOME | DRG: 392 | End: 2024-05-22
Attending: EMERGENCY MEDICINE | Admitting: STUDENT IN AN ORGANIZED HEALTH CARE EDUCATION/TRAINING PROGRAM
Payer: MEDICARE

## 2024-05-20 VITALS
DIASTOLIC BLOOD PRESSURE: 70 MMHG | OXYGEN SATURATION: 97 % | TEMPERATURE: 96.9 F | BODY MASS INDEX: 26.58 KG/M2 | SYSTOLIC BLOOD PRESSURE: 138 MMHG | WEIGHT: 196 LBS | HEART RATE: 99 BPM

## 2024-05-20 DIAGNOSIS — K86.89 PANCREATIC MASS: ICD-10-CM

## 2024-05-20 DIAGNOSIS — K57.32 DIVERTICULITIS OF COLON: Primary | ICD-10-CM

## 2024-05-20 DIAGNOSIS — R10.84 GENERALIZED ABDOMINAL PAIN: ICD-10-CM

## 2024-05-20 DIAGNOSIS — R19.7 DIARRHEA, UNSPECIFIED TYPE: ICD-10-CM

## 2024-05-20 DIAGNOSIS — R50.9 FEVER, UNSPECIFIED FEVER CAUSE: ICD-10-CM

## 2024-05-20 DIAGNOSIS — R10.84 GENERALIZED ABDOMINAL PAIN: Primary | ICD-10-CM

## 2024-05-20 PROBLEM — N17.9 ACUTE KIDNEY INJURY SUPERIMPOSED ON CHRONIC KIDNEY DISEASE (HCC): Status: ACTIVE | Noted: 2024-05-20

## 2024-05-20 PROBLEM — K57.92 DIVERTICULITIS: Status: ACTIVE | Noted: 2024-05-20

## 2024-05-20 PROBLEM — N18.9 ACUTE KIDNEY INJURY SUPERIMPOSED ON CHRONIC KIDNEY DISEASE (HCC): Status: ACTIVE | Noted: 2024-05-20

## 2024-05-20 LAB
ALBUMIN SERPL-MCNC: 4.2 G/DL (ref 3.5–5.2)
ALBUMIN/GLOB SERPL: 1.5 {RATIO} (ref 1–2.5)
ALP SERPL-CCNC: 61 U/L (ref 40–129)
ALT SERPL-CCNC: 17 U/L (ref 5–41)
ANION GAP SERPL CALCULATED.3IONS-SCNC: 13 MMOL/L (ref 9–17)
ANION GAP SERPL CALCULATED.3IONS-SCNC: 15 MMOL/L (ref 9–17)
AST SERPL-CCNC: 15 U/L
BACTERIA URNS QL MICRO: ABNORMAL
BASOPHILS # BLD: <0.03 K/UL (ref 0–0.2)
BASOPHILS NFR BLD: 0 % (ref 0–2)
BILIRUB SERPL-MCNC: 0.4 MG/DL (ref 0.3–1.2)
BILIRUB UR QL STRIP: NEGATIVE
BUN SERPL-MCNC: 44 MG/DL (ref 8–23)
BUN SERPL-MCNC: 44 MG/DL (ref 8–23)
BUN/CREAT SERPL: 18 (ref 9–20)
BUN/CREAT SERPL: 21 (ref 9–20)
CALCIUM SERPL-MCNC: 9.3 MG/DL (ref 8.6–10.4)
CALCIUM SERPL-MCNC: 9.5 MG/DL (ref 8.6–10.4)
CHLORIDE SERPL-SCNC: 101 MMOL/L (ref 98–107)
CHLORIDE SERPL-SCNC: 101 MMOL/L (ref 98–107)
CHLORIDE UR-SCNC: 70 MMOL/L
CLARITY UR: CLEAR
CO2 SERPL-SCNC: 23 MMOL/L (ref 20–31)
CO2 SERPL-SCNC: 24 MMOL/L (ref 20–31)
COLOR UR: YELLOW
CREAT SERPL-MCNC: 2.1 MG/DL (ref 0.7–1.2)
CREAT SERPL-MCNC: 2.4 MG/DL (ref 0.7–1.2)
CREAT UR-MCNC: 215.9 MG/DL (ref 39–259)
CREAT UR-MCNC: 217.8 MG/DL (ref 39–259)
CRP SERPL HS-MCNC: 122.4 MG/L (ref 0–5)
EOSINOPHIL # BLD: 0.03 K/UL (ref 0–0.44)
EOSINOPHILS RELATIVE PERCENT: 0 % (ref 1–4)
EPI CELLS #/AREA URNS HPF: ABNORMAL /HPF (ref 0–5)
ERYTHROCYTE [DISTWIDTH] IN BLOOD BY AUTOMATED COUNT: 12.6 % (ref 11.8–14.4)
ERYTHROCYTE [SEDIMENTATION RATE] IN BLOOD BY PHOTOMETRIC METHOD: 13 MM/HR (ref 0–20)
GFR, ESTIMATED: 26 ML/MIN/1.73M2
GFR, ESTIMATED: 30 ML/MIN/1.73M2
GLUCOSE SERPL-MCNC: 102 MG/DL (ref 70–99)
GLUCOSE SERPL-MCNC: 119 MG/DL (ref 70–99)
GLUCOSE UR STRIP-MCNC: NEGATIVE MG/DL
HCT VFR BLD AUTO: 42.6 % (ref 40.7–50.3)
HGB BLD-MCNC: 14.8 G/DL (ref 13–17)
HGB UR QL STRIP.AUTO: NEGATIVE
IMM GRANULOCYTES # BLD AUTO: 0.04 K/UL (ref 0–0.3)
IMM GRANULOCYTES NFR BLD: 0 %
KETONES UR STRIP-MCNC: NEGATIVE MG/DL
LEUKOCYTE ESTERASE UR QL STRIP: ABNORMAL
LIPASE SERPL-CCNC: 49 U/L (ref 13–60)
LYMPHOCYTES NFR BLD: 1.4 K/UL (ref 1.1–3.7)
LYMPHOCYTES RELATIVE PERCENT: 14 % (ref 24–43)
MCH RBC QN AUTO: 30.6 PG (ref 25.2–33.5)
MCHC RBC AUTO-ENTMCNC: 34.7 G/DL (ref 28.4–34.8)
MCV RBC AUTO: 88.2 FL (ref 82.6–102.9)
MONOCYTES NFR BLD: 0.46 K/UL (ref 0.1–1.2)
MONOCYTES NFR BLD: 5 % (ref 3–12)
MUCOUS THREADS URNS QL MICRO: ABNORMAL
NEUTROPHILS NFR BLD: 81 % (ref 36–65)
NEUTS SEG NFR BLD: 8.2 K/UL (ref 1.5–8.1)
NITRITE UR QL STRIP: NEGATIVE
NRBC BLD-RTO: 0 PER 100 WBC
PH UR STRIP: 6 [PH] (ref 5–9)
PLATELET # BLD AUTO: 209 K/UL (ref 138–453)
PMV BLD AUTO: 10.3 FL (ref 8.1–13.5)
POTASSIUM SERPL-SCNC: 3.5 MMOL/L (ref 3.7–5.3)
POTASSIUM SERPL-SCNC: 3.9 MMOL/L (ref 3.7–5.3)
POTASSIUM, UR: 53.7 MMOL/L
PROT SERPL-MCNC: 7 G/DL (ref 6.4–8.3)
PROT UR STRIP-MCNC: NEGATIVE MG/DL
RBC # BLD AUTO: 4.83 M/UL (ref 4.21–5.77)
RBC #/AREA URNS HPF: ABNORMAL /HPF (ref 0–2)
SODIUM SERPL-SCNC: 138 MMOL/L (ref 135–144)
SODIUM SERPL-SCNC: 139 MMOL/L (ref 135–144)
SODIUM UR-SCNC: 95 MMOL/L
SP GR UR STRIP: 1.02 (ref 1.01–1.02)
TOTAL PROTEIN, URINE: 20 MG/DL
URINE TOTAL PROTEIN CREATININE RATIO: 0.09 (ref 0–0.2)
UROBILINOGEN UR STRIP-ACNC: NORMAL EU/DL (ref 0–1)
WBC #/AREA URNS HPF: ABNORMAL /HPF (ref 0–5)
WBC OTHER # BLD: 10.2 K/UL (ref 3.5–11.3)

## 2024-05-20 PROCEDURE — 99214 OFFICE O/P EST MOD 30 MIN: CPT | Performed by: NURSE PRACTITIONER

## 2024-05-20 PROCEDURE — G8427 DOCREV CUR MEDS BY ELIG CLIN: HCPCS | Performed by: NURSE PRACTITIONER

## 2024-05-20 PROCEDURE — 2580000003 HC RX 258: Performed by: EMERGENCY MEDICINE

## 2024-05-20 PROCEDURE — 86140 C-REACTIVE PROTEIN: CPT

## 2024-05-20 PROCEDURE — 99285 EMERGENCY DEPT VISIT HI MDM: CPT

## 2024-05-20 PROCEDURE — 85025 COMPLETE CBC W/AUTO DIFF WBC: CPT

## 2024-05-20 PROCEDURE — 36415 COLL VENOUS BLD VENIPUNCTURE: CPT

## 2024-05-20 PROCEDURE — 1036F TOBACCO NON-USER: CPT | Performed by: NURSE PRACTITIONER

## 2024-05-20 PROCEDURE — 3078F DIAST BP <80 MM HG: CPT | Performed by: NURSE PRACTITIONER

## 2024-05-20 PROCEDURE — 80053 COMPREHEN METABOLIC PANEL: CPT

## 2024-05-20 PROCEDURE — 82436 ASSAY OF URINE CHLORIDE: CPT

## 2024-05-20 PROCEDURE — 83690 ASSAY OF LIPASE: CPT

## 2024-05-20 PROCEDURE — 81001 URINALYSIS AUTO W/SCOPE: CPT

## 2024-05-20 PROCEDURE — 84300 ASSAY OF URINE SODIUM: CPT

## 2024-05-20 PROCEDURE — 1200000000 HC SEMI PRIVATE

## 2024-05-20 PROCEDURE — 87086 URINE CULTURE/COLONY COUNT: CPT

## 2024-05-20 PROCEDURE — 99211 OFF/OP EST MAY X REQ PHY/QHP: CPT | Performed by: NURSE PRACTITIONER

## 2024-05-20 PROCEDURE — 6360000002 HC RX W HCPCS: Performed by: STUDENT IN AN ORGANIZED HEALTH CARE EDUCATION/TRAINING PROGRAM

## 2024-05-20 PROCEDURE — 6360000002 HC RX W HCPCS: Performed by: EMERGENCY MEDICINE

## 2024-05-20 PROCEDURE — 84156 ASSAY OF PROTEIN URINE: CPT

## 2024-05-20 PROCEDURE — 3075F SYST BP GE 130 - 139MM HG: CPT | Performed by: NURSE PRACTITIONER

## 2024-05-20 PROCEDURE — 2580000003 HC RX 258: Performed by: STUDENT IN AN ORGANIZED HEALTH CARE EDUCATION/TRAINING PROGRAM

## 2024-05-20 PROCEDURE — 94761 N-INVAS EAR/PLS OXIMETRY MLT: CPT

## 2024-05-20 PROCEDURE — G8417 CALC BMI ABV UP PARAM F/U: HCPCS | Performed by: NURSE PRACTITIONER

## 2024-05-20 PROCEDURE — 82570 ASSAY OF URINE CREATININE: CPT

## 2024-05-20 PROCEDURE — 80048 BASIC METABOLIC PNL TOTAL CA: CPT

## 2024-05-20 PROCEDURE — 1123F ACP DISCUSS/DSCN MKR DOCD: CPT | Performed by: NURSE PRACTITIONER

## 2024-05-20 PROCEDURE — 83935 ASSAY OF URINE OSMOLALITY: CPT

## 2024-05-20 PROCEDURE — 84540 ASSAY OF URINE/UREA-N: CPT

## 2024-05-20 PROCEDURE — 84133 ASSAY OF URINE POTASSIUM: CPT

## 2024-05-20 PROCEDURE — 85652 RBC SED RATE AUTOMATED: CPT

## 2024-05-20 PROCEDURE — 74176 CT ABD & PELVIS W/O CONTRAST: CPT

## 2024-05-20 RX ORDER — MORPHINE SULFATE 4 MG/ML
4 INJECTION, SOLUTION INTRAMUSCULAR; INTRAVENOUS
Status: DISCONTINUED | OUTPATIENT
Start: 2024-05-20 | End: 2024-05-22

## 2024-05-20 RX ORDER — ACETAMINOPHEN 325 MG/1
650 TABLET ORAL EVERY 6 HOURS PRN
Status: DISCONTINUED | OUTPATIENT
Start: 2024-05-20 | End: 2024-05-22 | Stop reason: HOSPADM

## 2024-05-20 RX ORDER — 0.9 % SODIUM CHLORIDE 0.9 %
500 INTRAVENOUS SOLUTION INTRAVENOUS ONCE
Status: COMPLETED | OUTPATIENT
Start: 2024-05-20 | End: 2024-05-20

## 2024-05-20 RX ORDER — AMLODIPINE BESYLATE 10 MG/1
10 TABLET ORAL DAILY
Status: DISCONTINUED | OUTPATIENT
Start: 2024-05-21 | End: 2024-05-22 | Stop reason: HOSPADM

## 2024-05-20 RX ORDER — SODIUM CHLORIDE 0.9 % (FLUSH) 0.9 %
5-40 SYRINGE (ML) INJECTION EVERY 12 HOURS SCHEDULED
Status: DISCONTINUED | OUTPATIENT
Start: 2024-05-20 | End: 2024-05-22 | Stop reason: HOSPADM

## 2024-05-20 RX ORDER — MORPHINE SULFATE 2 MG/ML
2 INJECTION, SOLUTION INTRAMUSCULAR; INTRAVENOUS
Status: DISCONTINUED | OUTPATIENT
Start: 2024-05-20 | End: 2024-05-22

## 2024-05-20 RX ORDER — VITAMIN B COMPLEX
1 CAPSULE ORAL DAILY
Status: DISCONTINUED | OUTPATIENT
Start: 2024-05-21 | End: 2024-05-21

## 2024-05-20 RX ORDER — ENOXAPARIN SODIUM 100 MG/ML
30 INJECTION SUBCUTANEOUS DAILY
Status: DISCONTINUED | OUTPATIENT
Start: 2024-05-20 | End: 2024-05-21

## 2024-05-20 RX ORDER — SODIUM CHLORIDE 9 MG/ML
INJECTION, SOLUTION INTRAVENOUS PRN
Status: DISCONTINUED | OUTPATIENT
Start: 2024-05-20 | End: 2024-05-22 | Stop reason: HOSPADM

## 2024-05-20 RX ORDER — SODIUM CHLORIDE 0.9 % (FLUSH) 0.9 %
5-40 SYRINGE (ML) INJECTION PRN
Status: DISCONTINUED | OUTPATIENT
Start: 2024-05-20 | End: 2024-05-22 | Stop reason: HOSPADM

## 2024-05-20 RX ORDER — ONDANSETRON 4 MG/1
4 TABLET, ORALLY DISINTEGRATING ORAL EVERY 8 HOURS PRN
Status: DISCONTINUED | OUTPATIENT
Start: 2024-05-20 | End: 2024-05-22 | Stop reason: HOSPADM

## 2024-05-20 RX ORDER — ACETAMINOPHEN 650 MG/1
650 SUPPOSITORY RECTAL EVERY 6 HOURS PRN
Status: DISCONTINUED | OUTPATIENT
Start: 2024-05-20 | End: 2024-05-22 | Stop reason: HOSPADM

## 2024-05-20 RX ORDER — SODIUM CHLORIDE 9 MG/ML
INJECTION, SOLUTION INTRAVENOUS CONTINUOUS
Status: DISCONTINUED | OUTPATIENT
Start: 2024-05-20 | End: 2024-05-22

## 2024-05-20 RX ORDER — ONDANSETRON 2 MG/ML
4 INJECTION INTRAMUSCULAR; INTRAVENOUS EVERY 6 HOURS PRN
Status: DISCONTINUED | OUTPATIENT
Start: 2024-05-20 | End: 2024-05-22 | Stop reason: HOSPADM

## 2024-05-20 RX ADMIN — SODIUM CHLORIDE 3000 MG: 9 INJECTION, SOLUTION INTRAVENOUS at 21:43

## 2024-05-20 RX ADMIN — ENOXAPARIN SODIUM 30 MG: 100 INJECTION SUBCUTANEOUS at 21:43

## 2024-05-20 RX ADMIN — SODIUM CHLORIDE: 9 INJECTION, SOLUTION INTRAVENOUS at 21:19

## 2024-05-20 RX ADMIN — SODIUM CHLORIDE 500 ML: 9 INJECTION, SOLUTION INTRAVENOUS at 21:40

## 2024-05-20 ASSESSMENT — PATIENT HEALTH QUESTIONNAIRE - PHQ9
SUM OF ALL RESPONSES TO PHQ QUESTIONS 1-9: 0
2. FEELING DOWN, DEPRESSED OR HOPELESS: NOT AT ALL
SUM OF ALL RESPONSES TO PHQ QUESTIONS 1-9: 0
SUM OF ALL RESPONSES TO PHQ9 QUESTIONS 1 & 2: 0
1. LITTLE INTEREST OR PLEASURE IN DOING THINGS: NOT AT ALL
SUM OF ALL RESPONSES TO PHQ QUESTIONS 1-9: 0
SUM OF ALL RESPONSES TO PHQ QUESTIONS 1-9: 0

## 2024-05-20 ASSESSMENT — PAIN DESCRIPTION - ORIENTATION: ORIENTATION: LOWER

## 2024-05-20 ASSESSMENT — PAIN SCALES - GENERAL: PAINLEVEL_OUTOF10: 4

## 2024-05-20 ASSESSMENT — ENCOUNTER SYMPTOMS
NAUSEA: 0
ABDOMINAL PAIN: 1
VOMITING: 0
DIARRHEA: 1

## 2024-05-20 ASSESSMENT — PAIN - FUNCTIONAL ASSESSMENT: PAIN_FUNCTIONAL_ASSESSMENT: 0-10

## 2024-05-20 ASSESSMENT — PAIN DESCRIPTION - LOCATION: LOCATION: ABDOMEN

## 2024-05-20 NOTE — PROGRESS NOTES
heart sounds. No murmur heard.  Pulmonary:      Effort: No respiratory distress.      Breath sounds: Normal breath sounds. No wheezing or rales.   Abdominal:      General: There is no distension.      Palpations: Abdomen is soft.      Tenderness: There is abdominal tenderness (Generalized tenderness to palpation, most severe mid-abdomen just superior to umbilicus, RUQ, and LUQ). There is no guarding.      Hernia: No hernia is present.      Comments: Normoactive bowel sounds upper quadrant bilaterally. Hypoactive, but present, bowel sounds lower quadrant bilaterally   Neurological:      Mental Status: He is alert.   Psychiatric:         Mood and Affect: Mood normal.         Behavior: Behavior normal.         Thought Content: Thought content normal.         Judgment: Judgment normal.         Data:     Lab Results   Component Value Date/Time     05/20/2024 07:07 PM    K 3.9 05/20/2024 07:07 PM     05/20/2024 07:07 PM    CO2 24 05/20/2024 07:07 PM    BUN 44 05/20/2024 07:07 PM    CREATININE 2.1 05/20/2024 07:07 PM    GLUCOSE 102 05/20/2024 07:07 PM    BILITOT 0.4 05/20/2024 07:07 PM    ALKPHOS 61 05/20/2024 07:07 PM    AST 15 05/20/2024 07:07 PM    ALT 17 05/20/2024 07:07 PM     Lab Results   Component Value Date/Time    WBC 10.2 05/20/2024 12:17 PM    RBC 4.83 05/20/2024 12:17 PM    HGB 14.8 05/20/2024 12:17 PM    HCT 42.6 05/20/2024 12:17 PM    MCV 88.2 05/20/2024 12:17 PM    MCH 30.6 05/20/2024 12:17 PM    MCHC 34.7 05/20/2024 12:17 PM    RDW 12.6 05/20/2024 12:17 PM     05/20/2024 12:17 PM    MPV 10.3 05/20/2024 12:17 PM     No results found for: \"TSH\"  Lab Results   Component Value Date/Time    CHOL 125 06/09/2023 07:05 AM    LDL 62 06/09/2023 07:05 AM    HDL 32 06/09/2023 07:05 AM    LABA1C 5.0 12/05/2022 07:43 AM       Assessment/Plan:      Diagnosis Orders   1. Generalized abdominal pain  CBC with Auto Differential    Basic Metabolic Panel    C-Reactive Protein    Sedimentation Rate    CT

## 2024-05-20 NOTE — ED PROVIDER NOTES
[facility]  EMERGENCY DEPARTMENT ENCOUNTER      Pt Name: Ezequiel Doshi  MRN: [id number]  Birthdate 1940  Date of evaluation: 5/20/2024  Provider: Raymundo Rangel MD    CHIEF COMPLAINT       Chief Complaint   Patient presents with    Abdominal Pain     Patient complains of lower abdominal pain that has been ongoing on and off for the past week.         HISTORY OF PRESENT ILLNESS      Ezequiel Doshi is a 83 y.o. male who presents to the emergency department for evaluation of abdominal pain.  States this has been present for a little over a week, intermittently.  Seems to be worse with movement at times, though no specific movements are noted.    Denies any other complaints.  Specifically, denies any nausea or vomiting, chest pain, dyspnea, diarrhea, blood in the stool or any  complaint.    No recent fever.    Seen by PCP today and advised to come to the ED because labs were abnormal.        PAST MEDICAL HISTORY     Past Medical History:   Diagnosis Date    Bowel obstruction (HCC) 2019    Hyperlipidemia     Hypertension     Neuropathy     Skin cancer          SURGICAL HISTORY       Past Surgical History:   Procedure Laterality Date    CATARACT REMOVAL WITH IMPLANT Right 03/05/2018    per Dr. Krishnan    CATARACT REMOVAL WITH IMPLANT Left 04/02/2018    COLONOSCOPY      over 15 yrs ago    COLONOSCOPY N/A 08/23/2022    COLONOSCOPY POLYPECTOMY SNARE/COLD BIOPSY performed by Yolanda Holden MD at [facility] OR    COLONOSCOPY W/ BIOPSIES AND POLYPECTOMY N/A 08/23/2022    Dr Holden-polyp(sessile serrated adenoma)sm intestine/terminal ileum bx(no abnormality)random colon bx(normal)severe sigmoid diverticulosis    [place] XCAPSL CTRC RMVL INSJ IO LENS PROSTH W/O ECP Right 03/05/2018    EYE CATARACT EMULSIFICATION IOL IMPLANT performed by Jose Krishnan DO at [facility] OR    [place] XCAPSL CTRC RMVL INSJ IO LENS PROSTH W/O ECP Left 04/02/2018    EYE CATARACT EMULSIFICATION IOL IMPLANT performed by Jose Krishnan DO at [facility] OR         CURRENT

## 2024-05-20 NOTE — ED PROVIDER NOTES
[facility]  EMERGENCY DEPARTMENT ENCOUNTER      Pt Name: Ezequiel Doshi  MRN: [id number]  Birthdate 1940  Date of evaluation: 5/20/2024  Provider: Maggie Washington MD    CHIEF COMPLAINT       Chief Complaint   Patient presents with    Abdominal Pain     Patient complains of lower abdominal pain that has been ongoing on and off for the past week.         HISTORY OF PRESENT ILLNESS   (Location/Symptom, Timing/Onset, Context/Setting, Quality, Duration, Modifying Factors, Severity)  Note limiting factors.   Ezequiel Doshi is a 83 y.o. male who presents to the emergency department ***     HPI    Nursing Notes were reviewed.    REVIEW OF SYSTEMS    (2-9 systems for level 4, 10 or more for level 5)     Review of Systems    Except as noted above the remainder of the review of systems was reviewed and negative.       PAST MEDICAL HISTORY     Past Medical History:   Diagnosis Date    Bowel obstruction (HCC) 2019    Hyperlipidemia     Hypertension     Skin cancer          SURGICAL HISTORY       Past Surgical History:   Procedure Laterality Date    CATARACT REMOVAL WITH IMPLANT Right 03/05/2018    per Dr. Krishnan    CATARACT REMOVAL WITH IMPLANT Left 04/02/2018    COLONOSCOPY      over 15 yrs ago    COLONOSCOPY N/A 08/23/2022    COLONOSCOPY POLYPECTOMY SNARE/COLD BIOPSY performed by Yolanda Holden MD at [facility] OR    COLONOSCOPY W/ BIOPSIES AND POLYPECTOMY N/A 08/23/2022    Dr Holden-polyp(sessile serrated adenoma)sm intestine/terminal ileum bx(no abnormality)random colon bx(normal)severe sigmoid diverticulosis    [place] XCAPSL CTRC RMVL INSJ IO LENS PROSTH W/O ECP Right 03/05/2018    EYE CATARACT EMULSIFICATION IOL IMPLANT performed by Jose Krishnan DO at [facility] OR    [place] XCAPSL CTRC RMVL INSJ IO LENS PROSTH W/O ECP Left 04/02/2018    EYE CATARACT EMULSIFICATION IOL IMPLANT performed by Jose Krishnan DO at [facility] OR         CURRENT MEDICATIONS       Previous Medications    AMLODIPINE (NORVASC) 10 MG TABLET    Take 1 tablet by

## 2024-05-21 ENCOUNTER — APPOINTMENT (OUTPATIENT)
Dept: MRI IMAGING | Age: 84
DRG: 392 | End: 2024-05-21
Payer: MEDICARE

## 2024-05-21 PROBLEM — E44.0 MODERATE MALNUTRITION (HCC): Status: ACTIVE | Noted: 2024-05-21

## 2024-05-21 LAB
ALBUMIN SERPL-MCNC: 3.9 G/DL (ref 3.5–5.2)
ALBUMIN/GLOB SERPL: 1.5 {RATIO} (ref 1–2.5)
ALP SERPL-CCNC: 56 U/L (ref 40–129)
ALT SERPL-CCNC: 15 U/L (ref 5–41)
ANION GAP SERPL CALCULATED.3IONS-SCNC: 13 MMOL/L (ref 9–17)
AST SERPL-CCNC: 13 U/L
BASOPHILS # BLD: <0.03 K/UL (ref 0–0.2)
BASOPHILS NFR BLD: 0 % (ref 0–2)
BILIRUB SERPL-MCNC: 0.5 MG/DL (ref 0.3–1.2)
BUN SERPL-MCNC: 36 MG/DL (ref 8–23)
BUN/CREAT SERPL: 21 (ref 9–20)
CALCIUM SERPL-MCNC: 8.8 MG/DL (ref 8.6–10.4)
CANCER AG19-9 SERPL IA-ACNC: 18 U/ML (ref 0–35)
CHLORIDE SERPL-SCNC: 105 MMOL/L (ref 98–107)
CO2 SERPL-SCNC: 22 MMOL/L (ref 20–31)
CREAT SERPL-MCNC: 1.7 MG/DL (ref 0.7–1.2)
CRP SERPL HS-MCNC: 60.2 MG/L (ref 0–5)
EOSINOPHIL # BLD: 0.07 K/UL (ref 0–0.44)
EOSINOPHILS RELATIVE PERCENT: 1 % (ref 1–4)
ERYTHROCYTE [DISTWIDTH] IN BLOOD BY AUTOMATED COUNT: 12.4 % (ref 11.8–14.4)
GFR, ESTIMATED: 39 ML/MIN/1.73M2
GLUCOSE SERPL-MCNC: 105 MG/DL (ref 70–99)
HCT VFR BLD AUTO: 39.3 % (ref 40.7–50.3)
HGB BLD-MCNC: 13.9 G/DL (ref 13–17)
IMM GRANULOCYTES # BLD AUTO: <0.03 K/UL (ref 0–0.3)
IMM GRANULOCYTES NFR BLD: 0 %
LYMPHOCYTES NFR BLD: 1.64 K/UL (ref 1.1–3.7)
LYMPHOCYTES RELATIVE PERCENT: 26 % (ref 24–43)
MAGNESIUM SERPL-MCNC: 2.1 MG/DL (ref 1.6–2.6)
MCH RBC QN AUTO: 30.8 PG (ref 25.2–33.5)
MCHC RBC AUTO-ENTMCNC: 35.4 G/DL (ref 28.4–34.8)
MCV RBC AUTO: 87.1 FL (ref 82.6–102.9)
MONOCYTES NFR BLD: 0.35 K/UL (ref 0.1–1.2)
MONOCYTES NFR BLD: 6 % (ref 3–12)
NEUTROPHILS NFR BLD: 67 % (ref 36–65)
NEUTS SEG NFR BLD: 4.17 K/UL (ref 1.5–8.1)
NRBC BLD-RTO: 0 PER 100 WBC
OSMOLALITY UR: 702 MOSM/KG (ref 80–1300)
PLATELET # BLD AUTO: 176 K/UL (ref 138–453)
PMV BLD AUTO: 9.8 FL (ref 8.1–13.5)
POTASSIUM SERPL-SCNC: 3.4 MMOL/L (ref 3.7–5.3)
PROT SERPL-MCNC: 6.5 G/DL (ref 6.4–8.3)
RBC # BLD AUTO: 4.51 M/UL (ref 4.21–5.77)
SODIUM SERPL-SCNC: 140 MMOL/L (ref 135–144)
UUN UR-MCNC: 825 MG/DL (ref 800–1666)
WBC OTHER # BLD: 6.3 K/UL (ref 3.5–11.3)

## 2024-05-21 PROCEDURE — 6360000002 HC RX W HCPCS: Performed by: STUDENT IN AN ORGANIZED HEALTH CARE EDUCATION/TRAINING PROGRAM

## 2024-05-21 PROCEDURE — 6370000000 HC RX 637 (ALT 250 FOR IP)

## 2024-05-21 PROCEDURE — 74183 MRI ABD W/O CNTR FLWD CNTR: CPT

## 2024-05-21 PROCEDURE — 86301 IMMUNOASSAY TUMOR CA 19-9: CPT

## 2024-05-21 PROCEDURE — 2580000003 HC RX 258: Performed by: STUDENT IN AN ORGANIZED HEALTH CARE EDUCATION/TRAINING PROGRAM

## 2024-05-21 PROCEDURE — 80053 COMPREHEN METABOLIC PANEL: CPT

## 2024-05-21 PROCEDURE — 85025 COMPLETE CBC W/AUTO DIFF WBC: CPT

## 2024-05-21 PROCEDURE — 83735 ASSAY OF MAGNESIUM: CPT

## 2024-05-21 PROCEDURE — 97165 OT EVAL LOW COMPLEX 30 MIN: CPT

## 2024-05-21 PROCEDURE — 6370000000 HC RX 637 (ALT 250 FOR IP): Performed by: STUDENT IN AN ORGANIZED HEALTH CARE EDUCATION/TRAINING PROGRAM

## 2024-05-21 PROCEDURE — 36415 COLL VENOUS BLD VENIPUNCTURE: CPT

## 2024-05-21 PROCEDURE — 1200000000 HC SEMI PRIVATE

## 2024-05-21 PROCEDURE — A9579 GAD-BASE MR CONTRAST NOS,1ML: HCPCS

## 2024-05-21 PROCEDURE — 6360000004 HC RX CONTRAST MEDICATION

## 2024-05-21 PROCEDURE — 6370000000 HC RX 637 (ALT 250 FOR IP): Performed by: NURSE PRACTITIONER

## 2024-05-21 PROCEDURE — 94761 N-INVAS EAR/PLS OXIMETRY MLT: CPT

## 2024-05-21 PROCEDURE — 86140 C-REACTIVE PROTEIN: CPT

## 2024-05-21 RX ORDER — VITAMIN C
1 TAB ORAL DAILY
Status: DISCONTINUED | OUTPATIENT
Start: 2024-05-21 | End: 2024-05-22 | Stop reason: HOSPADM

## 2024-05-21 RX ORDER — LOSARTAN POTASSIUM 50 MG/1
100 TABLET ORAL DAILY
Status: DISCONTINUED | OUTPATIENT
Start: 2024-05-21 | End: 2024-05-22 | Stop reason: HOSPADM

## 2024-05-21 RX ORDER — CETIRIZINE HYDROCHLORIDE 10 MG/1
10 TABLET ORAL DAILY
Status: DISCONTINUED | OUTPATIENT
Start: 2024-05-21 | End: 2024-05-22 | Stop reason: HOSPADM

## 2024-05-21 RX ORDER — ENOXAPARIN SODIUM 100 MG/ML
40 INJECTION SUBCUTANEOUS DAILY
Status: DISCONTINUED | OUTPATIENT
Start: 2024-05-22 | End: 2024-05-22 | Stop reason: HOSPADM

## 2024-05-21 RX ORDER — LOSARTAN POTASSIUM AND HYDROCHLOROTHIAZIDE 25; 100 MG/1; MG/1
1 TABLET ORAL DAILY
Status: DISCONTINUED | OUTPATIENT
Start: 2024-05-21 | End: 2024-05-21 | Stop reason: CLARIF

## 2024-05-21 RX ORDER — ATORVASTATIN CALCIUM 10 MG/1
10 TABLET, FILM COATED ORAL DAILY
Status: DISCONTINUED | OUTPATIENT
Start: 2024-05-21 | End: 2024-05-22 | Stop reason: HOSPADM

## 2024-05-21 RX ORDER — HYDROCHLOROTHIAZIDE 25 MG/1
25 TABLET ORAL DAILY
Status: DISCONTINUED | OUTPATIENT
Start: 2024-05-21 | End: 2024-05-22 | Stop reason: HOSPADM

## 2024-05-21 RX ADMIN — ENOXAPARIN SODIUM 30 MG: 100 INJECTION SUBCUTANEOUS at 08:22

## 2024-05-21 RX ADMIN — SODIUM CHLORIDE 3000 MG: 9 INJECTION, SOLUTION INTRAVENOUS at 13:53

## 2024-05-21 RX ADMIN — SODIUM CHLORIDE: 9 INJECTION, SOLUTION INTRAVENOUS at 10:07

## 2024-05-21 RX ADMIN — LOSARTAN POTASSIUM 100 MG: 50 TABLET, FILM COATED ORAL at 17:21

## 2024-05-21 RX ADMIN — CETIRIZINE HYDROCHLORIDE 10 MG: 10 TABLET, FILM COATED ORAL at 17:21

## 2024-05-21 RX ADMIN — ACETAMINOPHEN 650 MG: 325 TABLET ORAL at 00:13

## 2024-05-21 RX ADMIN — AMLODIPINE BESYLATE 10 MG: 10 TABLET ORAL at 08:22

## 2024-05-21 RX ADMIN — ATORVASTATIN CALCIUM 10 MG: 10 TABLET, FILM COATED ORAL at 17:20

## 2024-05-21 RX ADMIN — ACETAMINOPHEN 650 MG: 325 TABLET ORAL at 16:08

## 2024-05-21 RX ADMIN — SODIUM CHLORIDE 3000 MG: 9 INJECTION, SOLUTION INTRAVENOUS at 20:08

## 2024-05-21 RX ADMIN — HYDROCHLOROTHIAZIDE 25 MG: 25 TABLET ORAL at 17:20

## 2024-05-21 RX ADMIN — CHOLECALCIFEROL TAB 10 MCG (400 UNIT) 400 UNITS: 10 TAB at 08:22

## 2024-05-21 RX ADMIN — GADOTERIDOL 8.5 ML: 279.3 INJECTION, SOLUTION INTRAVENOUS at 08:37

## 2024-05-21 RX ADMIN — SODIUM CHLORIDE 3000 MG: 9 INJECTION, SOLUTION INTRAVENOUS at 08:22

## 2024-05-21 RX ADMIN — Medication 1 TABLET: at 08:22

## 2024-05-21 ASSESSMENT — PAIN DESCRIPTION - DESCRIPTORS
DESCRIPTORS: NUMBNESS;TINGLING
DESCRIPTORS: BURNING

## 2024-05-21 ASSESSMENT — PAIN SCALES - GENERAL
PAINLEVEL_OUTOF10: 0
PAINLEVEL_OUTOF10: 2
PAINLEVEL_OUTOF10: 1

## 2024-05-21 ASSESSMENT — PAIN DESCRIPTION - ONSET: ONSET: ON-GOING

## 2024-05-21 ASSESSMENT — PAIN DESCRIPTION - FREQUENCY: FREQUENCY: CONTINUOUS

## 2024-05-21 ASSESSMENT — PAIN DESCRIPTION - LOCATION
LOCATION: FOOT
LOCATION: FOOT

## 2024-05-21 ASSESSMENT — PAIN DESCRIPTION - ORIENTATION
ORIENTATION: RIGHT;LEFT
ORIENTATION: RIGHT;LEFT

## 2024-05-21 ASSESSMENT — PAIN DESCRIPTION - PAIN TYPE: TYPE: NEUROPATHIC PAIN

## 2024-05-21 ASSESSMENT — PAIN - FUNCTIONAL ASSESSMENT: PAIN_FUNCTIONAL_ASSESSMENT: ACTIVITIES ARE NOT PREVENTED

## 2024-05-21 NOTE — PROGRESS NOTES
Occupational Therapy  Facility/Department: [facility] MED SURG  Occupational Therapy Initial Assessment    Name: Ezequiel Doshi  : 1940  MRN: [id number]  Date of Service: 2024    Discharge Recommendations:  Home with assist PRN          Patient Diagnosis(es): The primary encounter diagnosis was Diverticulitis of colon. A diagnosis of Pancreatic mass was also pertinent to this visit.  Past Medical History:  has a past medical history of Bowel obstruction (HCC), Hyperlipidemia, Hypertension, Neuropathy, and Skin cancer.  Past Surgical History:  has a past surgical history that includes Colonoscopy; Cataract removal with implant (Right, 2018); pr xcapsl ctrc rmvl insj io lens prosth w/o ecp (Right, 2018); Cataract removal with implant (Left, 2018); pr xcapsl ctrc rmvl insj io lens prosth w/o ecp (Left, 2018); Colonoscopy w/ biopsies and polypectomy (N/A, 2022); and Colonoscopy (N/A, 2022).    Treatment Diagnosis: generalized weakness      Assessment   Assessment: Pt is a 83 y.o male admitted d/t diverticulitis. Pt is able to perform bed mobility independently. Independent to complete ADL tasks including toileting and dressing. Demonstrates good dynamic reaching balance and indepedent with functional mobility. No skilled occupational therapy services needed at this time.  Treatment Diagnosis: generalized weakness  Prognosis: Good  Decision Making: Low Complexity  No Skilled OT: Independent with ADL's  REQUIRES OT FOLLOW-UP: No  Activity Tolerance  Activity Tolerance: Patient Tolerated treatment well  Activity Tolerance Comments: Good endurance to perform functional mobility within room        Plan         Restrictions  Restrictions/Precautions  Restrictions/Precautions: NPO    Subjective   General  Chart Reviewed: Yes  Patient assessed for rehabilitation services?: Yes  Family / Caregiver Present: No  Referring Practitioner: Dr. Green  Diagnosis:  - ADL  AM-PAC Daily Activity - Inpatient   How much help is needed for putting on and taking off regular lower body clothing?: None  How much help is needed for bathing (which includes washing, rinsing, drying)?: None  How much help is needed for toileting (which includes using toilet, bedpan, or urinal)?: None  How much help is needed for putting on and taking off regular upper body clothing?: None  How much help is needed for taking care of personal grooming?: None  How much help for eating meals?: None  AM-[facility] Inpatient Daily Activity Raw Score: 24  AM-PAC Inpatient ADL T-Scale Score : 57.54  ADL Inpatient CMS 0-100% Score: 0  ADL Inpatient CMS G-Code Modifier : CH    Tinneti Score       Goals  Short Term Goals  Time Frame for Short Term Goals: 1 visit  Short Term Goal 1: Pt will be assessed for safety and independence with ADLs and functional mobility.       Therapy Time   Individual Concurrent Group Co-treatment   Time In 1131         Time Out 1140         Minutes 9                 Lisbeth English OT

## 2024-05-21 NOTE — PROGRESS NOTES
[facility]  Inpatient/Observation/Outpatient Rehabilitation    Date: 2024  Patient Name: Ezequiel Doshi       [x] Inpatient Acute/Observation       []  Outpatient  : 1940     [x] Pt refused/declined therapy at this time due to:        Patient reports he does not need skilled PT at this time.  He reports he has been going to the bathroom and ambulating independently.  Confirmed this with RN.        Dat Dos Santos, PT, DPT Date: 2024

## 2024-05-21 NOTE — PROGRESS NOTES
Progress Note    SUBJECTIVE:    Patient seen for f/u of Diverticulitis.  He resting in bed no pain at rest Stated he stood to stand to urinate and had no pain.  Stated he feels \"okay\".  Discussed findings on scans.       ROS:   Constitutional: negative  for fevers, and negative for chills.  Respiratory: negative for shortness of breath, negative for cough, and negative for wheezing  Cardiovascular: negative for chest pain, and negative for palpitations  Gastrointestinal: negative for abdominal pain, negative for nausea,negative for vomiting, negative for diarrhea, and negative for constipation     All other systems were reviewed with the patient and are negative unless otherwise stated in HPI      OBJECTIVE:      Vitals:   Vitals:    05/21/24 0006   BP: 112/64   Pulse: 64   Resp: 18   Temp: 98 °F (36.7 °C)   SpO2: 97%     Weight - Scale: 88 kg (194 lb)   Height: 182.9 cm (6')     Weight  Wt Readings from Last 3 Encounters:   05/21/24 88 kg (194 lb)   05/20/24 88.9 kg (196 lb)   12/18/23 92.1 kg (203 lb)     Body mass index is 26.31 kg/m².    24HR INTAKE/OUTPUT:      Intake/Output Summary (Last 24 hours) at 5/21/2024 0705  Last data filed at 5/21/2024 0453  Gross per 24 hour   Intake 1130.07 ml   Output 500 ml   Net 630.07 ml     -----------------------------------------------------------------  Exam:    GEN:    Awake, alert and oriented x3.   EYES:  EOMI, pupils equal   NECK: Supple. No lymphadenopathy.  No carotid bruit  CVS:    regular rate and rhythm, no audible murmur  PULM:  CTA, no wheezes, rales or rhonchi, no acute respiratory distress  ABD:    Bowels sounds normal.  Abdomen is soft.  No distention.  no tenderness to palpation.   EXT:   no edema bilaterally .  No calf tenderness.   NEURO: Moves all extremities.  Motor and sensory are grossly intact  SKIN:  No rashes.  No skin lesions.    -----------------------------------------------------------------    Diagnostic Data:      Complete Blood Count:   Recent

## 2024-05-21 NOTE — PROGRESS NOTES
Writer spoke with Cynthia at the oncology to set up consult - \"there are no doctors here this week, we may be able to do a virtual consult with another doctor.\" Cynthia is waiting to hear back from their  regarding a virtual visit that they hope to schedule for tomorrow, 5-22-24. Will await her return call.

## 2024-05-21 NOTE — H&P
History and Physical    Patient:  Ezequiel Doshi  MRN: [id number]    Chief Complaint: Abdominal pain    History Obtained From:  patient, electronic medical record    PCP: Snow Roth APRN - CNP    History of Present Illness:   The patient is a 83 y.o. male who presents with abdominal pain.  Past medical history includes SBO, CKD, hypertension, and BPH.  Patient states he had left lower quadrant pain intermittently for the last week.  He states the pain gets worse with movement.  He did have 3 days of constipation that has since relieved itself.  He states he is only been eating about a third of what he normally eats and has early satiety.  He has had decreased oral intake.  He denies any nausea, vomiting, difficulty swallowing, diarrhea or blood in his stool.  He denies any fever or chills or recent sick exposure.  He did see his PCP today, had outpatient lab work and was sent to the emergency department for further evaluation.  Outpatient lab work showed a creatinine of 2.4 with a baseline of 1.4.  CRP was 122.4.  No leukocytosis present.  Repeat BUN/creatinine were 44 and 2.1 on arrival to emergency department.  CT scan abdomen pelvis showed jejunal diverticulitis as well as a new intermediate density lesion in the pancreatic neck measuring 2.5 cm and bilateral indeterminate renal lesions measuring up to 4.1 cm, with recommendation for MRI.  He currently denies any pain.  He states he is starting to feel hungry.    Past Medical History:        Diagnosis Date    Bowel obstruction (HCC) 2019    Hyperlipidemia     Hypertension     Neuropathy     Skin cancer        Past Surgical History:        Procedure Laterality Date    CATARACT REMOVAL WITH IMPLANT Right 03/05/2018    per Dr. Krishnan    CATARACT REMOVAL WITH IMPLANT Left 04/02/2018    COLONOSCOPY      over 15 yrs ago    COLONOSCOPY N/A 08/23/2022    COLONOSCOPY POLYPECTOMY SNARE/COLD BIOPSY performed by Yolanda Holden MD at [facility] OR    COLONOSCOPY W/ BIOPSIES AND

## 2024-05-21 NOTE — PROGRESS NOTES
Met with Patient and with his wife this p.m. to discuss discharge planning.  Patient is an 83 year old , white male, admitted with a diagnosis of Pancreatic Mass.  Patient is alert and oriented, polite and cooperative wit this assessment.  Wishes to return home with his wife when deemed medically stable for discharge.    Patient resides at home in [place] with his wife.  He is retired.  Reports being active and independent, doing all the outdoor chores and his wife does the indoor work.  Patient uses no DME and has no current outside resources or services in place.  He ordinarily drives himself and provides for his own transportation needs.    PCP is Snow Roth and he last saw her yesterday.  Patient has medical insurance and reports having no difficulty with regards to affording his prescription medications at this point.    Discharge plan is home with his wife when stable.  Patient is a 'Full Code' status and reports that they do have Advanced Directives in place.  LSW encouraged them to reach out as needed.  Patient shares that he is not sure what might be needed in the future.  Active listening employed at this time.  Reassurance and emotional support provided.  Will continue to monitor and assist with discharge planning needs as appropriate.    Delores Sommer, MICHAEL  5/21/2024

## 2024-05-21 NOTE — PROGRESS NOTES
Pharmacist Review and Automatic Dose Adjustment of prophylactic enoxaparin      The reviewing pharmacist has made an adjustment to the ordered enoxaparin dose or converted to UFH per the approved [facility] protocol and table as identified below.        Ezequiel Doshi is a 83 y.o. male.     Recent Labs     05/20/24  1217 05/20/24  1907 05/21/24  0800   CREATININE 2.4* 2.1* 1.7*       Estimated Creatinine Clearance: 35 mL/min (A) (based on SCr of 1.7 mg/dL (H)).    Height:   Ht Readings from Last 1 Encounters:   05/21/24 1.829 m (6')     Weight:  Wt Readings from Last 1 Encounters:   05/21/24 88 kg (194 lb)         Enoxaparin Indication: DVT/PE prophylaxis          Plan: Based upon renal function, the enoxaparin dose has been changed from Lovenox 30 mg SQ daily to Lovenox 40 mg SQ daily.    Thank you,  Shirley Morin, PharmD, 5/21/2024 9:01 AM

## 2024-05-21 NOTE — PROGRESS NOTES
[facility]          Department of Pharmacy   Pharmacy Renal Adjustment Note    Ezequiel Doshi is a 83 y.o. male. Pharmacist assessment of renally cleared medications.    Recent Labs     05/20/24  1217 05/20/24  1907 05/21/24  0800   CREATININE 2.4* 2.1* 1.7*     Estimated Creatinine Clearance: 35 mL/min (A) (based on SCr of 1.7 mg/dL (H)).    Height:   Ht Readings from Last 1 Encounters:   05/21/24 1.829 m (6')     Weight:  Wt Readings from Last 1 Encounters:   05/21/24 88 kg (194 lb)       The following medication(s) have been adjusted based upon renal function:             Unasyn 3 gm q12 increased to Unasyn 3 gm every 6 hours for CrCl > 30 ml/min.    Thank you,  Shirley Morin, PharmD, 5/21/2024 8:59 AM

## 2024-05-21 NOTE — PROGRESS NOTES
Comprehensive Nutrition Assessment    Type and Reason for Visit:  Initial, Positive Nutrition Screen (MST 1)    Nutrition Recommendations/Plan:   Continue NPO diet.   Progress to low fiber diet as medically appropriate.      Malnutrition Assessment:  Malnutrition Status:  Moderate malnutrition (05/21/24 0642)    Context:  Acute Illness     Findings of the 6 clinical characteristics of malnutrition:  Energy Intake:  50% or less of estimated energy requirements for 5 or more days  Weight Loss:  1% to 2% over 1 week (1.7%)     Body Fat Loss:  No significant body fat loss     Muscle Mass Loss:  No significant muscle mass loss    Fluid Accumulation:  No significant fluid accumulation     Strength:  Not Performed    Nutrition Assessment:    Moderate malnutrition (acute) r/t altered GI function/structure aeb NPO, pancreatic mass, weight loss 1.7% x 1 week, decreased PO-eating 1/4-1/3 rd of usual PO/early satiety before admission x 1 week. Pt states he has been following the FODMAP diet and had been reintroducing foods without difficulty. Declines offer of ensure when diet advanced, \"don't like that stuff.\" Provided low and high fiber diet information.    Nutrition Related Findings:    appears well nourished Wound Type: None       Current Nutrition Intake & Therapies:    Average Meal Intake: NPO  Average Supplements Intake: NPO  Diet NPO Exceptions are: Sips of Water with Meds    Anthropometric Measures:  Height: 182.9 cm (6')  Ideal Body Weight (IBW): 178 lbs (81 kg)    Admission Body Weight: 87.3 kg (192 lb 6 oz)  Current Body Weight: 88 kg (194 lb 0.1 oz), 109 % IBW. Weight Source: Bed Scale  Current BMI (kg/m2): 26.3  Usual Body Weight: 89.8 kg (198 lb)  % Weight Change (Calculated): -2  Weight Adjustment For: No Adjustment                 BMI Categories: Overweight (BMI 25.0-29.9)    Estimated Daily Nutrient Needs:  Energy Requirements Based On: Kcal/kg  Weight Used for Energy Requirements: Current  Energy  (kcal/day): 6505-4275 (25-28/kg)  Weight Used for Protein Requirements: Ideal  Protein (g/day): 105-130g (1.3-1.6g/kg)  Method Used for Fluid Requirements: 1 ml/kcal  Fluid (ml/day): 2,200 ml  Hematology:  Recent Labs     05/20/24  1217 05/21/24  0800   WBC 10.2 6.3   HGB 14.8 13.9   HCT 42.6 39.3*     Chemistry:  Recent Labs     05/20/24  1217 05/20/24  1907 05/21/24  0800    138 140   K 3.5* 3.9 PENDING    101 105   CO2 23 24 22   GLUCOSE 119* 102* 105*   BUN 44* 44* 36*   CREATININE 2.4* 2.1* 1.7*   CALCIUM 9.3 9.5 8.8     Recent Labs     05/20/24  1220 05/20/24 1907 05/21/24  0800   AST  --  15 13   ALT  --  17 15   ALKPHOS  --  61 56   BILITOT  --  0.4 0.5   LIPASE 49  --   --      No results found for: \"VITD25\"        Nutrition Diagnosis:   Moderate malnutrition, In context of acute illness or injury related to altered GI function, altered GI structure as evidenced by NPO or clear liquid status due to medical condition, other (comment), weight loss 1%-2% in 1 week, poor intake prior to admission (early satiety, pancreatic mass)    Nutrition Interventions:   Food and/or Nutrient Delivery: Continue NPO  Nutrition Education/Counseling: Education completed  Coordination of Nutrition Care: Continue to monitor while inpatient  Plan of Care discussed with: Patient    Goals:     Goals: Initiate PO diet       Nutrition Monitoring and Evaluation:   Behavioral-Environmental Outcomes: None Identified  Food/Nutrient Intake Outcomes: Diet Advancement/Tolerance  Physical Signs/Symptoms Outcomes: Biochemical Data, Weight, GI Status    Discharge Planning:    Too soon to determine     KRIS ELLIS RD, LD  Contact: [phone]

## 2024-05-21 NOTE — PROGRESS NOTES
Writer to bedside to complete morning assessment. Upon entry to room, pt awake and lying in bed, respirations even and unlabored while on room air. Vitals obtained and assessment completed, see flow sheet for details. Pt denies needs from writer at this time. Call light in reach. Care ongoing.

## 2024-05-21 NOTE — PROGRESS NOTES
Writer spoke with Cynthia at oncology office and she has still not heard back regarding a date/time for consult. I gave her pt's wife's phone number for the virtual visit once it's set up. Tiffany's (pt's wife) phone number is: [phone]. Will await return call and/or follow up for appointment date/time. Care ongoing, call light within reach.

## 2024-05-21 NOTE — PROGRESS NOTES
Patient admitted to room 327 at this time, report received from ER RN. Admission vitals and assessment and navigator completed at this time, see flowsheets for details. Patient A&O x4. Patient denies any pain. Active bowel sounds in all 4 quadrants, patient reported tenderness in LLQ. Patient resting in bed with alarm on, wheels locked, and call light in reach. Patient denies any further needs at this time.

## 2024-05-21 NOTE — PLAN OF CARE
Problem: Discharge Planning  Goal: Discharge to home or other facility with appropriate resources  5/20/2024 2245 by Agustina Lyman RN  Outcome: Progressing  Flowsheets (Taken 5/20/2024 2243)  Discharge to home or other facility with appropriate resources:   Identify barriers to discharge with patient and caregiver   Identify discharge learning needs (meds, wound care, etc)     Problem: Discharge Planning  Goal: Discharge to home or other facility with appropriate resources  5/20/2024 2243 by Agustina Lyman RN  Outcome: Progressing  Flowsheets (Taken 5/20/2024 2243)  Discharge to home or other facility with appropriate resources:   Identify barriers to discharge with patient and caregiver   Identify discharge learning needs (meds, wound care, etc)     Problem: Safety - Adult  Goal: Free from fall injury  5/20/2024 2245 by Agustina Lyman RN  Outcome: Progressing  Flowsheets (Taken 5/20/2024 2243)  Free From Fall Injury: Instruct family/caregiver on patient safety     Problem: ABCDS Injury Assessment  Goal: Absence of physical injury  5/20/2024 2245 by Agustina Lyman RN  Outcome: Progressing  Flowsheets (Taken 5/20/2024 2243)  Absence of Physical Injury: Implement safety measures based on patient assessment     Problem: Pain  Goal: Verbalizes/displays adequate comfort level or baseline comfort level  Outcome: Progressing  Flowsheets (Taken 5/20/2024 2245)  Verbalizes/displays adequate comfort level or baseline comfort level:   Encourage patient to monitor pain and request assistance   Assess pain using appropriate pain scale

## 2024-05-22 ENCOUNTER — TELEPHONE (OUTPATIENT)
Dept: ONCOLOGY | Age: 84
End: 2024-05-22

## 2024-05-22 ENCOUNTER — TELEMEDICINE (OUTPATIENT)
Dept: ONCOLOGY | Age: 84
DRG: 392 | End: 2024-05-22
Payer: MEDICARE

## 2024-05-22 VITALS
DIASTOLIC BLOOD PRESSURE: 75 MMHG | TEMPERATURE: 97.6 F | RESPIRATION RATE: 18 BRPM | OXYGEN SATURATION: 91 % | HEIGHT: 72 IN | SYSTOLIC BLOOD PRESSURE: 138 MMHG | HEART RATE: 72 BPM | WEIGHT: 192 LBS | BODY MASS INDEX: 26.01 KG/M2

## 2024-05-22 DIAGNOSIS — K86.89 PANCREATIC MASS: ICD-10-CM

## 2024-05-22 DIAGNOSIS — K57.92 DIVERTICULITIS: Primary | ICD-10-CM

## 2024-05-22 LAB
ALBUMIN SERPL-MCNC: 3.9 G/DL (ref 3.5–5.2)
ALBUMIN/GLOB SERPL: 1.4 {RATIO} (ref 1–2.5)
ALP SERPL-CCNC: 56 U/L (ref 40–129)
ALT SERPL-CCNC: 14 U/L (ref 5–41)
ANION GAP SERPL CALCULATED.3IONS-SCNC: 14 MMOL/L (ref 9–17)
AST SERPL-CCNC: 16 U/L
BASOPHILS # BLD: 0.04 K/UL (ref 0–0.2)
BASOPHILS NFR BLD: 1 % (ref 0–2)
BILIRUB SERPL-MCNC: 0.5 MG/DL (ref 0.3–1.2)
BUN SERPL-MCNC: 25 MG/DL (ref 8–23)
BUN/CREAT SERPL: 17 (ref 9–20)
CALCIUM SERPL-MCNC: 8.8 MG/DL (ref 8.6–10.4)
CHLORIDE SERPL-SCNC: 105 MMOL/L (ref 98–107)
CO2 SERPL-SCNC: 23 MMOL/L (ref 20–31)
CREAT SERPL-MCNC: 1.5 MG/DL (ref 0.7–1.2)
CRP SERPL HS-MCNC: 31.4 MG/L (ref 0–5)
EOSINOPHIL # BLD: 0.07 K/UL (ref 0–0.44)
EOSINOPHILS RELATIVE PERCENT: 1 % (ref 1–4)
ERYTHROCYTE [DISTWIDTH] IN BLOOD BY AUTOMATED COUNT: 12.2 % (ref 11.8–14.4)
GFR, ESTIMATED: 45 ML/MIN/1.73M2
GLUCOSE SERPL-MCNC: 92 MG/DL (ref 70–99)
HCT VFR BLD AUTO: 42.7 % (ref 40.7–50.3)
HGB BLD-MCNC: 15.3 G/DL (ref 13–17)
IMM GRANULOCYTES # BLD AUTO: 0.03 K/UL (ref 0–0.3)
IMM GRANULOCYTES NFR BLD: 0 %
LYMPHOCYTES NFR BLD: 2.03 K/UL (ref 1.1–3.7)
LYMPHOCYTES RELATIVE PERCENT: 29 % (ref 24–43)
MCH RBC QN AUTO: 31 PG (ref 25.2–33.5)
MCHC RBC AUTO-ENTMCNC: 35.8 G/DL (ref 28.4–34.8)
MCV RBC AUTO: 86.4 FL (ref 82.6–102.9)
MICROORGANISM SPEC CULT: NORMAL
MONOCYTES NFR BLD: 0.37 K/UL (ref 0.1–1.2)
MONOCYTES NFR BLD: 5 % (ref 3–12)
NEUTROPHILS NFR BLD: 64 % (ref 36–65)
NEUTS SEG NFR BLD: 4.52 K/UL (ref 1.5–8.1)
NRBC BLD-RTO: 0 PER 100 WBC
PLATELET # BLD AUTO: 200 K/UL (ref 138–453)
PMV BLD AUTO: 9.8 FL (ref 8.1–13.5)
POTASSIUM SERPL-SCNC: 3.8 MMOL/L (ref 3.7–5.3)
PROT SERPL-MCNC: 6.7 G/DL (ref 6.4–8.3)
RBC # BLD AUTO: 4.94 M/UL (ref 4.21–5.77)
SODIUM SERPL-SCNC: 142 MMOL/L (ref 135–144)
SPECIMEN DESCRIPTION: NORMAL
WBC OTHER # BLD: 7.1 K/UL (ref 3.5–11.3)

## 2024-05-22 PROCEDURE — 80053 COMPREHEN METABOLIC PANEL: CPT

## 2024-05-22 PROCEDURE — 94761 N-INVAS EAR/PLS OXIMETRY MLT: CPT

## 2024-05-22 PROCEDURE — 6370000000 HC RX 637 (ALT 250 FOR IP): Performed by: NURSE PRACTITIONER

## 2024-05-22 PROCEDURE — 36415 COLL VENOUS BLD VENIPUNCTURE: CPT

## 2024-05-22 PROCEDURE — 6360000002 HC RX W HCPCS: Performed by: STUDENT IN AN ORGANIZED HEALTH CARE EDUCATION/TRAINING PROGRAM

## 2024-05-22 PROCEDURE — 85025 COMPLETE CBC W/AUTO DIFF WBC: CPT

## 2024-05-22 PROCEDURE — 86140 C-REACTIVE PROTEIN: CPT

## 2024-05-22 PROCEDURE — 6370000000 HC RX 637 (ALT 250 FOR IP): Performed by: STUDENT IN AN ORGANIZED HEALTH CARE EDUCATION/TRAINING PROGRAM

## 2024-05-22 PROCEDURE — 2580000003 HC RX 258: Performed by: STUDENT IN AN ORGANIZED HEALTH CARE EDUCATION/TRAINING PROGRAM

## 2024-05-22 PROCEDURE — 6370000000 HC RX 637 (ALT 250 FOR IP)

## 2024-05-22 PROCEDURE — 99223 1ST HOSP IP/OBS HIGH 75: CPT | Performed by: INTERNAL MEDICINE

## 2024-05-22 RX ORDER — AMOXICILLIN AND CLAVULANATE POTASSIUM 875; 125 MG/1; MG/1
1 TABLET, FILM COATED ORAL 2 TIMES DAILY
Qty: 16 TABLET | Refills: 0 | Status: SHIPPED | OUTPATIENT
Start: 2024-05-22 | End: 2024-05-30

## 2024-05-22 RX ORDER — SODIUM CHLORIDE 9 MG/ML
INJECTION, SOLUTION INTRAVENOUS CONTINUOUS
Status: DISCONTINUED | OUTPATIENT
Start: 2024-05-22 | End: 2024-05-22 | Stop reason: HOSPADM

## 2024-05-22 RX ADMIN — SODIUM CHLORIDE 3000 MG: 9 INJECTION, SOLUTION INTRAVENOUS at 08:40

## 2024-05-22 RX ADMIN — CHOLECALCIFEROL TAB 10 MCG (400 UNIT) 400 UNITS: 10 TAB at 08:35

## 2024-05-22 RX ADMIN — SODIUM CHLORIDE: 9 INJECTION, SOLUTION INTRAVENOUS at 00:22

## 2024-05-22 RX ADMIN — SODIUM CHLORIDE 3000 MG: 9 INJECTION, SOLUTION INTRAVENOUS at 03:00

## 2024-05-22 RX ADMIN — HYDROCHLOROTHIAZIDE 25 MG: 25 TABLET ORAL at 08:35

## 2024-05-22 RX ADMIN — CETIRIZINE HYDROCHLORIDE 10 MG: 10 TABLET, FILM COATED ORAL at 08:35

## 2024-05-22 RX ADMIN — ACETAMINOPHEN 650 MG: 325 TABLET ORAL at 03:00

## 2024-05-22 RX ADMIN — LOSARTAN POTASSIUM 100 MG: 50 TABLET, FILM COATED ORAL at 08:35

## 2024-05-22 RX ADMIN — ATORVASTATIN CALCIUM 10 MG: 10 TABLET, FILM COATED ORAL at 08:35

## 2024-05-22 RX ADMIN — Medication 1 TABLET: at 08:35

## 2024-05-22 RX ADMIN — AMLODIPINE BESYLATE 10 MG: 10 TABLET ORAL at 08:36

## 2024-05-22 ASSESSMENT — PAIN DESCRIPTION - ORIENTATION: ORIENTATION: RIGHT;LEFT;LOWER

## 2024-05-22 ASSESSMENT — PAIN SCALES - GENERAL
PAINLEVEL_OUTOF10: 1
PAINLEVEL_OUTOF10: 0
PAINLEVEL_OUTOF10: 0

## 2024-05-22 ASSESSMENT — PAIN DESCRIPTION - LOCATION: LOCATION: LEG

## 2024-05-22 ASSESSMENT — PAIN - FUNCTIONAL ASSESSMENT: PAIN_FUNCTIONAL_ASSESSMENT: ACTIVITIES ARE NOT PREVENTED

## 2024-05-22 ASSESSMENT — PAIN DESCRIPTION - DESCRIPTORS: DESCRIPTORS: ACHING

## 2024-05-22 NOTE — PROGRESS NOTES
Pt and his wife have been walking the hallways for approximately 30 minutes without any difficulty.

## 2024-05-22 NOTE — PLAN OF CARE
Problem: Discharge Planning  Goal: Discharge to home or other facility with appropriate resources  5/22/2024 1204 by Yesi Diaz RN  Outcome: Adequate for Discharge  5/21/2024 2254 by Shirley Hampton RN  Outcome: Progressing  Flowsheets (Taken 5/21/2024 2254)  Discharge to home or other facility with appropriate resources: Identify barriers to discharge with patient and caregiver     Problem: Safety - Adult  Goal: Free from fall injury  5/22/2024 1204 by Yesi Diaz RN  Outcome: Adequate for Discharge  5/21/2024 2254 by Shirley Hampton RN  Outcome: Progressing  Flowsheets (Taken 5/21/2024 2254)  Free From Fall Injury: Instruct family/caregiver on patient safety     Problem: ABCDS Injury Assessment  Goal: Absence of physical injury  5/22/2024 1204 by Yesi Diaz RN  Outcome: Adequate for Discharge  5/21/2024 2254 by Shirley Hampton RN  Outcome: Progressing  Flowsheets (Taken 5/21/2024 2254)  Absence of Physical Injury: Implement safety measures based on patient assessment     Problem: Pain  Goal: Verbalizes/displays adequate comfort level or baseline comfort level  5/22/2024 1204 by Yesi Diaz RN  Outcome: Adequate for Discharge  5/21/2024 2254 by Shirley Hampton RN  Outcome: Progressing  Flowsheets (Taken 5/21/2024 2254)  Verbalizes/displays adequate comfort level or baseline comfort level:   Encourage patient to monitor pain and request assistance   Administer analgesics based on type and severity of pain and evaluate response   Consider cultural and social influences on pain and pain management   Assess pain using appropriate pain scale   Implement non-pharmacological measures as appropriate and evaluate response   Notify Licensed Independent Practitioner if interventions unsuccessful or patient reports new pain     Problem: Nutrition Deficit:  Goal: Optimize nutritional status  5/22/2024 1204 by Yesi Diaz RN  Outcome: Adequate for Discharge  5/21/2024 2254

## 2024-05-22 NOTE — DISCHARGE SUMMARY
Discharge Summary    Ezequiel Doshi  :  1940  MRN:  [id number]    Admit date:  2024      Discharge date: 2024     Admitting Physician:  Edward Green MD    Discharge Diagnoses:    Principal Problem:    Diverticulitis  Active Problems:    Moderate malnutrition (HCC)    Essential hypertension    Stage 3a chronic kidney disease (HCC)    Acute kidney injury superimposed on chronic kidney disease (HCC)  Resolved Problems:    * No resolved hospital problems. *      Hospital Course:   Ezequiel Doshi is a 83 y.o. male admitted with diverticulitis.  He presented with complaints of abdominal pain.  He stated pain was left lower quadrant and has been intermittent over the past week.  Pain worsened with movement.  He complained of constipation for 3 days but was relieved prior to admission.  He reported decrease in appetite and oral intake.  He denied nausea or vomiting.  He denied any diarrhea or melena or hematochezia.  He denied fever or chills.  He was evaluated by his PCP and had outpatient workup and then sent to the emergency room for further evaluation.  Patient's initial creatinine was 2.4 with a baseline of 1.4 and CRP was elevated at 122.4.  He had no leukocytosis.  CT abdomen and pelvis showed jejunal diverticulitis as well as a new intermediate density lesion in the pancreatic neck measuring 2.5 cm and bilateral indeterminate renal lesions measuring up to 4.1 cm.  He denied any pain.  During patient's admission he was placed on IV fluids as well as IV antibiotics labs were monitored electrolytes replaced.  Renal function is back to near baseline with a BUN of 36 and a creatinine of 1.7.  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  I did do a CA 19-9 which was normal at 18.  I did ask for evaluation by oncology and Dr. Childress evaluated images as well as lab work and talked with the patient.  Plan will be to discharge patient today I will give him

## 2024-05-22 NOTE — PROGRESS NOTES
IMM letter provided to patient.  Patient offered four hours to make informed decision regarding appeal process; patient agreeable to discharge.      MICHAEL Sanchez

## 2024-05-22 NOTE — PROGRESS NOTES
Progress Note    SUBJECTIVE:    Patient seen for f/u of Diverticulitis.  He resting in bed no complaints. Had formed BM today.         ROS:   Constitutional: negative  for fevers, and negative for chills.  Respiratory: negative for shortness of breath, negative for cough, and negative for wheezing  Cardiovascular: negative for chest pain, and negative for palpitations  Gastrointestinal: negative for abdominal pain, negative for nausea,negative for vomiting, negative for diarrhea, and negative for constipation     All other systems were reviewed with the patient and are negative unless otherwise stated in HPI      OBJECTIVE:      Vitals:   Vitals:    05/22/24 0015   BP: 132/72   Pulse: 68   Resp: 17   Temp: 97.3 °F (36.3 °C)   SpO2: 95%     Weight - Scale: 87.1 kg (192 lb)   Height: 182.9 cm (6')     Weight  Wt Readings from Last 3 Encounters:   05/22/24 87.1 kg (192 lb)   05/20/24 88.9 kg (196 lb)   12/18/23 92.1 kg (203 lb)     Body mass index is 26.04 kg/m².    24HR INTAKE/OUTPUT:      Intake/Output Summary (Last 24 hours) at 5/22/2024 0637  Last data filed at 5/22/2024 0456  Gross per 24 hour   Intake 1787.43 ml   Output --   Net 1787.43 ml     -----------------------------------------------------------------  Exam:    GEN:    Awake, alert and oriented x3.   EYES:  EOMI, pupils equal   NECK: Supple. No lymphadenopathy.  No carotid bruit  CVS:    regular rate and rhythm, no audible murmur  PULM:  CTA, no wheezes, rales or rhonchi, no acute respiratory distress  ABD:    Bowels sounds normal.  Abdomen is soft.  No distention.  no tenderness to palpation.   EXT:   no edema bilaterally .  No calf tenderness.   NEURO: Moves all extremities.  Motor and sensory are grossly intact  SKIN:  No rashes.  No skin lesions.    -----------------------------------------------------------------    Diagnostic Data:      Complete Blood Count:   Recent Labs     05/20/24  1217 05/21/24  0800   WBC 10.2 6.3   RBC 4.83 4.51   HGB 14.8  pending    [facility] Advanced Care Planning documentation:  [x] I have confirmed that the patient's Advance Care Plan is present, Code Status is documented, or surrogate decision maker is listed in the patient's medical record  [If \"yes\", STOP HERE]     [] The patient's Advance Care Plan is NOT present because:    []  I confirmed today that the patient does not wish or was not able to name a   surrogate decision maker or provide and advance care plan.    [] Hospice care is currently being provided or has been provided within the   calendar year.    []  I did NOT confirm today the presence of an Advance Care Plan or surrogate   decision maker documented within the patient's medical record.   [DOES NOT SATISFY [facility] PERFORMANCE]    GOLD Vee - CNP , GOLD, NP-C  [facility]        5/22/2024, 6:37 AM

## 2024-05-22 NOTE — TELEPHONE ENCOUNTER
Name: Ezequiel Doshi  : 1940  MRN: [id number]    Oncology Navigation- Initial Note:  Notes: Referral received from Britt MCGUIRE.  Pt was seen by VV today while admitted at [facility].  Pt is to have endoscopic bx at [facility] by Dr. Noble.  Office has been contacted by Britt per note.   Pt is scheduled for follow up with Dr. Broderick on 24.  F/u may need to be moved out depending on scheduling of bx.      Will await scheduling of endoscopy at [facility].      Electronically signed by Annie Gutierrez RN on 2024 at 3:29 PM

## 2024-05-22 NOTE — PROGRESS NOTES
Patient in bed, resting with eyes closed. Patient easily arouses via verbal stimulation. Patient educated on medication to be given tonight and physician's orders to be completed. Patient stated understanding. Patient encouraged to ask questions. Patient denies of any questions at this time.    Vitals taken and documented. See flow sheet for details. Assessment completed and documented. Patient alert, oriented x4. Calm, pleasant. Patient complains of chronic tingling in bilateral lower extremities. Speech clear. Lung sounds clear throughout lung lobes. No cough noted. Abdomen round, soft, tenderness to palpation in LLQ. Bowel sounds active in all four quadrants. No edema noted. Patient denies of pain, chest pain, numbness,  or shortness of breath. Patient denies needs or concerns at this time. Call light and over bed table in reach. Side rails up times two.

## 2024-05-22 NOTE — PROGRESS NOTES
Patient in bed, resting with eyes closed. Patient easily arouses via verbal stimulation. Reassessment completed at this time. Vitals taken and documented. Patient encouraged to ask questions. Patient denies pain or complaints. Call light and bed side table within reach. Side rails up times two. Trash and linen taken out of patient's room.

## 2024-05-22 NOTE — PROGRESS NOTES
this is a billable service, which includes applicable co-pays. This Virtual Visit was conducted with patient's (and/or legal guardian's) consent. Patient identification was verified, and a caregiver was present when appropriate.   The patient was located at Other: inpatient at [facility]   Provider was located at Other: [facility]   Confirm you are appropriately licensed, registered, or certified to deliver care in the state where the patient is located as indicated above. If you are not or unsure, please re-schedule the visit: Yes, I confirm.        Total time spent for this encounter: Not billed by time    --Joni Childress MD on 5/22/2024 at 11:31 AM    An electronic signature was used to authenticate this note.   Attestation   The patient  being evaluated by a Virtual Visit (video visit) encounter to address concerns as mentioned above.  A caregiver was present when appropriate. Due to this being a TeleHealth encounter (During COVID-19 public health emergency), evaluation of the following organ systems was limited: Vitals/Constitutional/EENT/Resp/CV/GI//MS/Neuro/Skin/Heme-Lymph-Imm.  Pursuant to the emergency declaration under the [place] Act and the National Emergencies Act, 1135 waiver authority and the Coronavirus Preparedness and Response Supplemental Appropriations Act, this Virtual Visit was conducted with patient's (and/or legal guardian's) consent, to reduce the patient's risk of exposure to COVID-19 and provide necessary medical care.  The patient (and/or legal guardian) has also been advised to contact this office for worsening conditions or problems, and seek emergency medical treatment and/or call 911 if deemed necessary.     Services were provided through a video synchronous discussion virtually to substitute for in-person clinic visit. Patient and provider were located at their individual homes.    --Joni Childress MD on 4/6/2020 at 3:50 PM    An

## 2024-05-22 NOTE — TELEPHONE ENCOUNTER
Dr. Garcia called alerting writer to pt's case.  Dr. Garcia requested assistance coordinating EGD/EUS w/[place] w/Dr. Noble.  Dr. Garcia stated pt to f/u @ [facility].  Spoke w/Maria Teresa, Dr. Noble's , updated on pt & Dr. Garcia's request.  Maria Teresa stated will contact pt to schedule.

## 2024-05-22 NOTE — PROGRESS NOTES
Reviewed discharge instructions with patient.  Patient aware of need to  prescription.  Reviewed new medication and side effects to monitor for.  Patient aware of date/time of follow up appointments.  Instructed to increase fluid intakes.  Educational handout given on Diverticulitis.  Questions answered.  Verbalizes understanding.  Copy of discharge instructions given to patient.

## 2024-05-22 NOTE — PROGRESS NOTES
Unasyn hung per orders at this time. Patient up walking around halls. Denies of any needs or concerns at this time.

## 2024-05-22 NOTE — DISCHARGE INSTR - DIET
Good nutrition is important when healing from an illness, injury, or surgery.  Follow any nutrition recommendations given to you during your hospital stay.   If you were given an oral nutrition supplement while in the hospital, continue to take this supplement at home.  You can take it with meals, in-between meals, and/or before bedtime. These supplements can be purchased at most local grocery stores, pharmacies, and chain [employer]-stores.   If you have any questions about your diet or nutrition, call the hospital and ask for the dietitian.  Regular, increase fluid intakes

## 2024-05-22 NOTE — PROGRESS NOTES
Writer to bedside to complete morning assessment. Upon entry to room, pt awake and sitting in bed, respirations even and unlabored while on room air. Vitals obtained and assessment completed, see flow sheet for details. Pt denies needs from writer at this time. Call light in reach. Care ongoing.

## 2024-05-22 NOTE — PLAN OF CARE
Problem: Discharge Planning  Goal: Discharge to home or other facility with appropriate resources  Outcome: Progressing  Flowsheets (Taken 5/21/2024 2254)  Discharge to home or other facility with appropriate resources: Identify barriers to discharge with patient and caregiver     Problem: Safety - Adult  Goal: Free from fall injury  Outcome: Progressing  Flowsheets (Taken 5/21/2024 2254)  Free From Fall Injury: Instruct family/caregiver on patient safety     Problem: ABCDS Injury Assessment  Goal: Absence of physical injury  Outcome: Progressing  Flowsheets (Taken 5/21/2024 2254)  Absence of Physical Injury: Implement safety measures based on patient assessment     Problem: Pain  Goal: Verbalizes/displays adequate comfort level or baseline comfort level  Outcome: Progressing  Flowsheets (Taken 5/21/2024 2254)  Verbalizes/displays adequate comfort level or baseline comfort level:   Encourage patient to monitor pain and request assistance   Administer analgesics based on type and severity of pain and evaluate response   Consider cultural and social influences on pain and pain management   Assess pain using appropriate pain scale   Implement non-pharmacological measures as appropriate and evaluate response   Notify Licensed Independent Practitioner if interventions unsuccessful or patient reports new pain     Problem: Nutrition Deficit:  Goal: Optimize nutritional status  Outcome: Progressing  Flowsheets (Taken 5/21/2024 2254)  Nutrient intake appropriate for improving, restoring, or maintaining nutritional needs: Monitor oral intake, labs, and treatment plans

## 2024-05-23 ENCOUNTER — TELEPHONE (OUTPATIENT)
Dept: PRIMARY CARE CLINIC | Age: 84
End: 2024-05-23

## 2024-05-23 ENCOUNTER — TELEPHONE (OUTPATIENT)
Dept: GASTROENTEROLOGY | Age: 84
End: 2024-05-23

## 2024-05-23 NOTE — TELEPHONE ENCOUNTER
Procedure scheduled/Aide  Procedure:EGD/EUS  Dx:Pancreatic mass  Ordering:Dr. Childress  Date:05/28/2024  Time:1:30 pm / Arrive: 12:00 pm  Hospital:[facility]   Bowel prep instructions sent to patient:n/a  Patient advised by phone/Efrain

## 2024-05-23 NOTE — TELEPHONE ENCOUNTER
Pt called in to cancel upcoming appt on 5/28/24 that was for Hospital f/u   Pt did not r/s.   Pt is having procedure in [place] that day and does not know what will be happening from there.   Procedure: ENDOSCOPIC ULTRASOUND WITH PATHOLOGY, EGD   Dx: pancreatic mass.    This is the same hospital dx

## 2024-05-23 NOTE — TELEPHONE ENCOUNTER
Care Transitions Initial Follow Up Call    Outreach made within 2 business days of discharge: Yes    Patient: Ezequiel Doshi Patient : 1940   MRN: [id number]  Reason for Admission: There are no discharge diagnoses documented for the most recent discharge.  Discharge Date: 24       Left message to call the office    Desirae Freeman

## 2024-05-24 NOTE — TELEPHONE ENCOUNTER
Care Transitions Initial Follow Up Call    Outreach made within 2 business days of discharge: Yes    Patient: Ezequiel Doshi Patient : 1940   MRN: [id number]  Reason for Admission: There are no discharge diagnoses documented for the most recent discharge.  Discharge Date: 24       Spoke with: Ezequiel    Discharge department/facility: [facility] Interactive Patient Contact:  Was patient able to fill all prescriptions: Yes  Was patient instructed to bring all medications to the follow-up visit: Yes  Is patient taking all medications as directed in the discharge summary? Yes  Does patient understand their discharge instructions: Yes  Does patient have questions or concerns that need addressed prior to 7-14 day follow up office visit: no he is following up with specialist first    Scheduled appointment with PCP within 7-14 days    Follow Up  Future Appointments   Date Time Provider Department Center   2024  8:30 AM Lisa Mcgovern MD [facility]   7/3/2024  8:40 AM Snow Roth APRN - CNP [facility]       Desirae Freeman

## 2024-05-24 NOTE — CONSULTS
DIAGNOSIS:   Abdominal mass, likely diverticular disease  Incidentally found pancreatic mass, suspicious  CURRENT THERAPY:  Workup in progress  BRIEF CASE HISTORY:   Ezequiel Doshi is a very pleasant 83 y.o. male who is admitted to the hospital with abdominal pain thought to be due to diverticulitis.  However, CT scan showed 2 cm pancreatic neck mass suspicious for malignancy.  MRI of the abdomen confirmed the presence of a mass as will be discussed below.  We are asked to do a consultation with the patient.  This consultation was done virtually.  The patient is in his room accompanied by his wife.  They are very concerned about the findings.  He states that his abdominal pain is improving with conservative treatment and he is looking forward to being discharged hopefully today or tomorrow.  He is very concerned about abdominal mass.  I ended up reviewing the CT and MRI with him and the imaging were reviewed with him virtually.  They were able to see the mass and understand the location and the differential diagnosis.  Before this abdominal pain, the patient had was minimally symptomatic.  Most of his pain is in the left lower quadrant of the abdomen so it is unlikely to be related to the mass.  Denies any nausea, vomiting, diarrhea or GI bleeding    PAST MEDICAL HISTORY: has a past medical history of Bowel obstruction (HCC), Hyperlipidemia, Hypertension, Neuropathy, and Skin cancer.    PAST SURGICAL HISTORY: has a past surgical history that includes Colonoscopy; Cataract removal with implant (Right, 03/05/2018); pr xcapsl ctrc rmvl insj io lens prosth w/o ecp (Right, 03/05/2018); Cataract removal with implant (Left, 04/02/2018); pr xcapsl ctrc rmvl insj io lens prosth w/o ecp (Left, 04/02/2018); Colonoscopy w/ biopsies and polypectomy (N/A, 08/23/2022); and Colonoscopy (N/A, 08/23/2022).     CURRENT MEDICATIONS:  has a current medication list which includes the following prescription(s):  this is a billable service, which includes applicable co-pays. This Virtual Visit was conducted with patient's (and/or legal guardian's) consent. Patient identification was verified, and a caregiver was present when appropriate.   The patient was located at Other: inpatient at [facility]   Provider was located at Other: [facility]   Confirm you are appropriately licensed, registered, or certified to deliver care in the state where the patient is located as indicated above. If you are not or unsure, please re-schedule the visit: Yes, I confirm.        Total time spent for this encounter: Not billed by time    --Joni Childress MD on 5/22/2024 at 11:31 AM    An electronic signature was used to authenticate this note.   Attestation   The patient  being evaluated by a Virtual Visit (video visit) encounter to address concerns as mentioned above.  A caregiver was present when appropriate. Due to this being a TeleHealth encounter (During COVID-19 public health emergency), evaluation of the following organ systems was limited: Vitals/Constitutional/EENT/Resp/CV/GI//MS/Neuro/Skin/Heme-Lymph-Imm.  Pursuant to the emergency declaration under the [place] Act and the National Emergencies Act, 1135 waiver authority and the Coronavirus Preparedness and Response Supplemental Appropriations Act, this Virtual Visit was conducted with patient's (and/or legal guardian's) consent, to reduce the patient's risk of exposure to COVID-19 and provide necessary medical care.  The patient (and/or legal guardian) has also been advised to contact this office for worsening conditions or problems, and seek emergency medical treatment and/or call 911 if deemed necessary.     Services were provided through a video synchronous discussion virtually to substitute for in-person clinic visit. Patient and provider were located at their individual homes.    --Joni Childress MD on 4/6/2020 at 3:50 PM    An

## 2024-05-27 NOTE — PROGRESS NOTES
Physician Progress Note      PATIENT:               SHANTELL GARCIAS  CSN #:                  [id number]  :                       1940  ADMIT DATE:       2024 5:44 PM  DISCH DATE:        2024 12:32 PM  RESPONDING  PROVIDER #:        Edward Green MD          QUERY TEXT:    Patient admitted with diverticulitis, noted to have pancreatic mass. 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  I did ask for evaluation by oncology and Dr. Childress   evaluated images as well as lab work and talked with the patient.  Oncology CN Incidentally found pancreatic mass. The pancreatic mass needs to   be biopsied.  This should be done through endoscopic ultrasound.  Differential   diagnosis including pancreatic cancer, neuroendocrine tumor versus benign   reasons.  MRI abdomen  Suspicious solid lesion of the pancreatic neck measuring 2.5   cm worrisome for ductal adenocarcinoma. EUS/FNA is advised.  CA 19-9-19  Treatment: oncology consult, outpatient follow up for biopsy    Thank You,  Nohelia Cantrell [facility], CDS  Options provided:  -- pancreatic adenocarcinoma  -- pancreatic mass  -- Other - I will add my own diagnosis  -- Disagree - Not applicable / Not valid  -- Disagree - Clinically unable to determine / Unknown  -- Refer to Clinical Documentation Reviewer    PROVIDER RESPONSE TEXT:    This patient has pancreatic mass.    Query created by: Nohelia Cantrell on 2024 10:09 PM      Electronically signed by:  Edward Green MD 2024 5:06 AM

## 2024-05-28 ENCOUNTER — HOSPITAL ENCOUNTER (OUTPATIENT)
Age: 84
Setting detail: OUTPATIENT SURGERY
Discharge: HOME | End: 2024-05-28
Attending: INTERNAL MEDICINE | Admitting: INTERNAL MEDICINE
Payer: MEDICARE

## 2024-05-28 ENCOUNTER — ANESTHESIA EVENT (OUTPATIENT)
Dept: OPERATING ROOM | Age: 84
End: 2024-05-28
Payer: MEDICARE

## 2024-05-28 ENCOUNTER — ANESTHESIA (OUTPATIENT)
Dept: OPERATING ROOM | Age: 84
End: 2024-05-28
Payer: MEDICARE

## 2024-05-28 ENCOUNTER — APPOINTMENT (OUTPATIENT)
Dept: ULTRASOUND IMAGING | Age: 84
End: 2024-05-28
Attending: INTERNAL MEDICINE
Payer: MEDICARE

## 2024-05-28 VITALS
WEIGHT: 192 LBS | HEART RATE: 67 BPM | DIASTOLIC BLOOD PRESSURE: 85 MMHG | HEIGHT: 72 IN | SYSTOLIC BLOOD PRESSURE: 168 MMHG | OXYGEN SATURATION: 93 % | BODY MASS INDEX: 26.01 KG/M2 | RESPIRATION RATE: 17 BRPM | TEMPERATURE: 97.3 F

## 2024-05-28 DIAGNOSIS — R10.84 GENERALIZED ABDOMINAL PAIN: ICD-10-CM

## 2024-05-28 DIAGNOSIS — K86.89 PANCREATIC MASS: ICD-10-CM

## 2024-05-28 LAB
GLUCOSE BLD-MCNC: 101 MG/DL (ref 75–110)
POTASSIUM BLD-SCNC: 3.9 MMOL/L (ref 3.5–4.5)

## 2024-05-28 PROCEDURE — 2500000003 HC RX 250 WO HCPCS: Performed by: NURSE ANESTHETIST, CERTIFIED REGISTERED

## 2024-05-28 PROCEDURE — 84132 ASSAY OF SERUM POTASSIUM: CPT

## 2024-05-28 PROCEDURE — 3700000000 HC ANESTHESIA ATTENDED CARE: Performed by: INTERNAL MEDICINE

## 2024-05-28 PROCEDURE — 2709999900 HC NON-CHARGEABLE SUPPLY: Performed by: INTERNAL MEDICINE

## 2024-05-28 PROCEDURE — 88177 CYTP FNA EVAL EA ADDL: CPT

## 2024-05-28 PROCEDURE — 6360000002 HC RX W HCPCS: Performed by: NURSE ANESTHETIST, CERTIFIED REGISTERED

## 2024-05-28 PROCEDURE — 7100000010 HC PHASE II RECOVERY - FIRST 15 MIN: Performed by: INTERNAL MEDICINE

## 2024-05-28 PROCEDURE — 3700000001 HC ADD 15 MINUTES (ANESTHESIA): Performed by: INTERNAL MEDICINE

## 2024-05-28 PROCEDURE — 88305 TISSUE EXAM BY PATHOLOGIST: CPT

## 2024-05-28 PROCEDURE — C1889 IMPLANT/INSERT DEVICE, NOC: HCPCS | Performed by: INTERNAL MEDICINE

## 2024-05-28 PROCEDURE — 82947 ASSAY GLUCOSE BLOOD QUANT: CPT

## 2024-05-28 PROCEDURE — 3609020800 HC EGD W/EUS FNA: Performed by: INTERNAL MEDICINE

## 2024-05-28 PROCEDURE — 7100000011 HC PHASE II RECOVERY - ADDTL 15 MIN: Performed by: INTERNAL MEDICINE

## 2024-05-28 PROCEDURE — 3609012400 HC EGD TRANSORAL BIOPSY SINGLE/MULTIPLE: Performed by: INTERNAL MEDICINE

## 2024-05-28 PROCEDURE — 2720000010 HC SURG SUPPLY STERILE: Performed by: INTERNAL MEDICINE

## 2024-05-28 PROCEDURE — 76975 GI ENDOSCOPIC ULTRASOUND: CPT | Performed by: INTERNAL MEDICINE

## 2024-05-28 PROCEDURE — 88172 CYTP DX EVAL FNA 1ST EA SITE: CPT

## 2024-05-28 PROCEDURE — 2580000003 HC RX 258: Performed by: NURSE ANESTHETIST, CERTIFIED REGISTERED

## 2024-05-28 DEVICE — WORKING LENGTH 235CM, WORKING CHANNEL 2.8MM
Type: IMPLANTABLE DEVICE | Status: FUNCTIONAL
Brand: RESOLUTION 360 CLIP

## 2024-05-28 RX ORDER — SODIUM CHLORIDE 0.9 % (FLUSH) 0.9 %
5-40 SYRINGE (ML) INJECTION EVERY 12 HOURS SCHEDULED
Status: DISCONTINUED | OUTPATIENT
Start: 2024-05-28 | End: 2024-05-28 | Stop reason: HOSPADM

## 2024-05-28 RX ORDER — SODIUM CHLORIDE, SODIUM LACTATE, POTASSIUM CHLORIDE, CALCIUM CHLORIDE 600; 310; 30; 20 MG/100ML; MG/100ML; MG/100ML; MG/100ML
INJECTION, SOLUTION INTRAVENOUS CONTINUOUS
Status: DISCONTINUED | OUTPATIENT
Start: 2024-05-28 | End: 2024-05-28 | Stop reason: HOSPADM

## 2024-05-28 RX ORDER — SODIUM CHLORIDE, SODIUM LACTATE, POTASSIUM CHLORIDE, CALCIUM CHLORIDE 600; 310; 30; 20 MG/100ML; MG/100ML; MG/100ML; MG/100ML
INJECTION, SOLUTION INTRAVENOUS CONTINUOUS PRN
Status: DISCONTINUED | OUTPATIENT
Start: 2024-05-28 | End: 2024-05-28 | Stop reason: SDUPTHER

## 2024-05-28 RX ORDER — LIDOCAINE HYDROCHLORIDE 10 MG/ML
INJECTION, SOLUTION EPIDURAL; INFILTRATION; INTRACAUDAL; PERINEURAL PRN
Status: DISCONTINUED | OUTPATIENT
Start: 2024-05-28 | End: 2024-05-28 | Stop reason: SDUPTHER

## 2024-05-28 RX ORDER — PROCHLORPERAZINE EDISYLATE 5 MG/ML
5 INJECTION INTRAMUSCULAR; INTRAVENOUS
Status: DISCONTINUED | OUTPATIENT
Start: 2024-05-28 | End: 2024-05-28 | Stop reason: HOSPADM

## 2024-05-28 RX ORDER — NALOXONE HYDROCHLORIDE 0.4 MG/ML
INJECTION, SOLUTION INTRAMUSCULAR; INTRAVENOUS; SUBCUTANEOUS PRN
Status: DISCONTINUED | OUTPATIENT
Start: 2024-05-28 | End: 2024-05-28 | Stop reason: HOSPADM

## 2024-05-28 RX ORDER — FENTANYL CITRATE 50 UG/ML
25 INJECTION, SOLUTION INTRAMUSCULAR; INTRAVENOUS EVERY 5 MIN PRN
Status: DISCONTINUED | OUTPATIENT
Start: 2024-05-28 | End: 2024-05-28 | Stop reason: HOSPADM

## 2024-05-28 RX ORDER — ONDANSETRON 2 MG/ML
4 INJECTION INTRAMUSCULAR; INTRAVENOUS
Status: DISCONTINUED | OUTPATIENT
Start: 2024-05-28 | End: 2024-05-28 | Stop reason: HOSPADM

## 2024-05-28 RX ORDER — PROPOFOL 10 MG/ML
INJECTION, EMULSION INTRAVENOUS CONTINUOUS PRN
Status: DISCONTINUED | OUTPATIENT
Start: 2024-05-28 | End: 2024-05-28 | Stop reason: SDUPTHER

## 2024-05-28 RX ADMIN — PROPOFOL 40 MG: 10 INJECTION, EMULSION INTRAVENOUS at 13:19

## 2024-05-28 RX ADMIN — PROPOFOL 150 MCG/KG/MIN: 10 INJECTION, EMULSION INTRAVENOUS at 13:20

## 2024-05-28 RX ADMIN — SODIUM CHLORIDE, POTASSIUM CHLORIDE, SODIUM LACTATE AND CALCIUM CHLORIDE: 600; 310; 30; 20 INJECTION, SOLUTION INTRAVENOUS at 13:11

## 2024-05-28 RX ADMIN — PROPOFOL 20 MG: 10 INJECTION, EMULSION INTRAVENOUS at 13:24

## 2024-05-28 RX ADMIN — LIDOCAINE HYDROCHLORIDE 50 MG: 10 INJECTION, SOLUTION EPIDURAL; INFILTRATION; INTRACAUDAL; PERINEURAL at 13:20

## 2024-05-28 ASSESSMENT — PAIN - FUNCTIONAL ASSESSMENT: PAIN_FUNCTIONAL_ASSESSMENT: NONE - DENIES PAIN

## 2024-05-28 NOTE — ANESTHESIA PRE PROCEDURE
Time of last solid consumption: 2330                        Date of last liquid consumption: 05/27/24                        Date of last solid food consumption: 05/27/24    BMI:   Wt Readings from Last 3 Encounters:   05/28/24 87.1 kg (192 lb)   05/22/24 87.1 kg (192 lb)   05/20/24 88.9 kg (196 lb)     Body mass index is 26.04 kg/m².    CBC:   Lab Results   Component Value Date/Time    WBC 7.1 05/22/2024 06:36 AM    RBC 4.94 05/22/2024 06:36 AM    HGB 15.3 05/22/2024 06:36 AM    HCT 42.7 05/22/2024 06:36 AM    MCV 86.4 05/22/2024 06:36 AM    RDW 12.2 05/22/2024 06:36 AM     05/22/2024 06:36 AM       CMP:   Lab Results   Component Value Date/Time     05/22/2024 06:36 AM    K 3.8 05/22/2024 06:36 AM     05/22/2024 06:36 AM    CO2 23 05/22/2024 06:36 AM    BUN 25 05/22/2024 06:36 AM    CREATININE 1.5 05/22/2024 06:36 AM    GFRAA 56 06/06/2022 08:25 AM    LABGLOM 45 05/22/2024 06:36 AM    LABGLOM >60 06/09/2023 07:05 AM    GLUCOSE 92 05/22/2024 06:36 AM    CALCIUM 8.8 05/22/2024 06:36 AM    BILITOT 0.5 05/22/2024 06:36 AM    ALKPHOS 56 05/22/2024 06:36 AM    AST 16 05/22/2024 06:36 AM    ALT 14 05/22/2024 06:36 AM       POC Tests:   Recent Labs     05/28/24  1222   POCK 3.9       Coags: No results found for: \"PROTIME\", \"INR\", \"APTT\"    HCG (If Applicable): No results found for: \"PREGTESTUR\", \"PREGSERUM\", \"HCG\", \"HCGQUANT\"     ABGs: No results found for: \"PHART\", \"PO2ART\", \"CAF3YEQ\", \"XFD7VTU\", \"BEART\", \"V3VKHJQX\"     Type & Screen (If Applicable):  No results found for: \"LABABO\"    Drug/Infectious Status (If Applicable):  No results found for: \"HIV\", \"HEPCAB\"    COVID-19 Screening (If Applicable): No results found for: \"COVID19\"        Anesthesia Evaluation  Patient summary reviewed  Airway: Mallampati: II  TM distance: >3 FB   Neck ROM: limited  Mouth opening: > = 3 FB   Dental:          Pulmonary: breath sounds clear to auscultation                             Cardiovascular:    (+)

## 2024-05-28 NOTE — DISCHARGE INSTRUCTIONS
[facility]    POST-ENDOSCOPY INSTRUCTIONS    1. ACTIVITY   No driving, operating machinery, or making important decisions for 24 hours.    Resume normal activity after 24 hours.  You may return to work after 24 hours.    2. DIET        Resume your usual diet unless specified below.   ***    3. MEDICATIONS    Resume your usual medications.    Avoid NSAIDs for 2 weeks.  Take plain Tylenol for pain as needed.     Do not consume alcohol, tranquilizers, or sleeping medications for 24 hour unless advised by your physician.                 4. PHYSICIAN FOLLOW-UP / INSTRUCTIONS    Please call the office/clinic in 10 days for biopsy results:      [  ] GI office:  [phone]          Follow up with your family physician as planned.    6. NORMAL CHANGES YOU MAY EXPERIENCE AFTER ENDOSCOPY:         EGD/EUS          Sore throat after EGD/EUS       A bloated feeling and belching from      air in stomach               You may feel fatigued for the next 24-48    hours due to the prep and sedation    7. CALL YOUR PHYSICIAN IF YOU EXPERIENCE ANY OF THE FOLLOWING      A.  Passing blood rectally or vomiting blood (color may be red or black)      B.  Severe abdominal pain or tenderness (that is not relieved by passing air)      C.  Fever, chills, or excessive sweating      D.  Persistent nausea or vomiting      E.  Redness or swelling at the IV site    If you have additional questions, PLEASE call your doctor or the [facility] GI [facility] ([phone])    Call your doctor for the following:   Chills   Temperature greater than 101   Pain that is not tolerable despite taking pain medicine as ordered   There is increased swelling, redness or warmth at surgical site   There is increased drainage or bleeding from surgical site   Do not remove surgical dressing unless instructed to do so by your surgeon            No alcoholic beverages, no driving or operating machinery, no making important decisions for 24 hours.   You may

## 2024-05-28 NOTE — H&P
History and Physical    Pt Name: Ezequiel Doshi  MRN: [id number]  YOB: 1940  Date of evaluation: 5/28/2024    SUBJECTIVE:   History of Chief Complaint:    Patient presents preprocedure for ENDOSCOPIC ULTRASOUND WITH PATHOLOGY.  He has a history of bowel obstruction 2019, more recently was inpatient with diverticulitis.  He says that he was admitted for three days, has been doing well since admission, abdominal pain resolved.  He had pancreatic abnormality noted on imaging, has been scheduled for procedure today.   Past Medical History    has a past medical history of Bowel obstruction (HCC), Hyperlipidemia, Hypertension, Neuropathy, and Skin cancer.  Past Surgical History   has a past surgical history that includes Colonoscopy; Cataract removal with implant (Right, 03/05/2018); pr xcapsl ctrc rmvl insj io lens prosth w/o ecp (Right, 03/05/2018); Cataract removal with implant (Left, 04/02/2018); pr xcapsl ctrc rmvl insj io lens prosth w/o ecp (Left, 04/02/2018); Colonoscopy w/ biopsies and polypectomy (N/A, 08/23/2022); and Colonoscopy (N/A, 08/23/2022).  Medications  Prior to Admission medications    Medication Sig Start Date End Date Taking? Authorizing Provider   amoxicillin-clavulanate (AUGMENTIN) 875-125 MG per tablet Take 1 tablet by mouth 2 times daily for 8 days 5/22/24 5/30/24  Yulissa Hutchison APRN - CNP   losartan-hydroCHLOROthiazide (HYZAAR) 100-25 MG per tablet Take 1 tablet by mouth daily 3/14/24   Snow Roth APRN - CNP   amLODIPine (NORVASC) 10 MG tablet Take 1 tablet by mouth daily 11/2/23   Snow Roth APRN - CNP   atorvastatin (LIPITOR) 10 MG tablet Take 1 tablet by mouth daily 7/14/23   Snow Roth APRN - CNP   b complex vitamins capsule Take 1 capsule by mouth daily    ProviderEdvin MD   vitamin D3 (CHOLECALCIFEROL) 10 MCG (400 UNIT) TABS tablet Take 1 tablet by mouth daily    Edvin Rose MD   loratadine (CLARITIN) 10 MG capsule Take 1 capsule

## 2024-05-29 LAB — CASE NUMBER:: NORMAL

## 2024-05-29 NOTE — ANESTHESIA POSTPROCEDURE EVALUATION
Department of Anesthesiology  Postprocedure Note    Patient: Ezequiel Doshi  MRN: [id number]  YOB: 1940  Date of evaluation: 5/29/2024    Procedure Summary       Date: 05/28/24 Room / Location: [address]    Anesthesia Start: 1311 Anesthesia Stop: 1422    Procedures:       ESOPHAGOGASTRODUODENOSCOPY ENDOSCOPIC ULTRASOUND FINE NEEDLE ASPIRATION      ESOPHAGOGASTRODUODENOSCOPY BIOPSY Diagnosis:       Pancreatic mass      (Pancreatic mass [K86.89])    Surgeons: Jeremy Noble MD Responsible Provider: Paramjit García MD    Anesthesia Type: MAC, TIVA ASA Status: 3            Anesthesia Type: No value filed.    Bella Phase I: Bella Score: 10    Bella Phase II: Bella Score: 10    Anesthesia Post Evaluation    Patient location during evaluation: bedside  Patient participation: complete - patient participated  Level of consciousness: awake  Airway patency: patent  Nausea & Vomiting: no nausea and no vomiting  Cardiovascular status: blood pressure returned to baseline  Respiratory status: acceptable  Hydration status: euvolemic  Comments: BP (!) 168/85   Pulse 67   Temp 97.3 °F (36.3 °C) (Temporal)   Resp 17   Ht 1.829 m (6')   Wt 87.1 kg (192 lb)   SpO2 93%   BMI 26.04 kg/m²     Pain management: adequate    No notable events documented.

## 2024-05-30 ENCOUNTER — TELEPHONE (OUTPATIENT)
Dept: GASTROENTEROLOGY | Age: 84
End: 2024-05-30

## 2024-05-30 LAB
SURGICAL PATHOLOGY REPORT: NORMAL
SURGICAL PATHOLOGY REPORT: NORMAL

## 2024-05-30 NOTE — TELEPHONE ENCOUNTER
Called patient Ezequiel and discussed the biopsy results from the pancreas consistent with cancer.  All questions answered.  Patient will be following Dr. Bernardo Crain..

## 2024-06-05 ENCOUNTER — OFFICE VISIT (OUTPATIENT)
Dept: ONCOLOGY | Age: 84
End: 2024-06-05
Payer: MEDICARE

## 2024-06-05 VITALS
DIASTOLIC BLOOD PRESSURE: 78 MMHG | BODY MASS INDEX: 26.28 KG/M2 | HEART RATE: 86 BPM | HEIGHT: 72 IN | RESPIRATION RATE: 18 BRPM | SYSTOLIC BLOOD PRESSURE: 125 MMHG | WEIGHT: 194 LBS | TEMPERATURE: 97.1 F

## 2024-06-05 DIAGNOSIS — C25.1 MALIGNANT NEOPLASM OF BODY OF PANCREAS (HCC): Primary | ICD-10-CM

## 2024-06-05 PROCEDURE — 1123F ACP DISCUSS/DSCN MKR DOCD: CPT | Performed by: INTERNAL MEDICINE

## 2024-06-05 PROCEDURE — G8417 CALC BMI ABV UP PARAM F/U: HCPCS | Performed by: INTERNAL MEDICINE

## 2024-06-05 PROCEDURE — 99215 OFFICE O/P EST HI 40 MIN: CPT | Performed by: INTERNAL MEDICINE

## 2024-06-05 PROCEDURE — G8427 DOCREV CUR MEDS BY ELIG CLIN: HCPCS | Performed by: INTERNAL MEDICINE

## 2024-06-05 PROCEDURE — 1036F TOBACCO NON-USER: CPT | Performed by: INTERNAL MEDICINE

## 2024-06-05 PROCEDURE — 1111F DSCHRG MED/CURRENT MED MERGE: CPT | Performed by: INTERNAL MEDICINE

## 2024-06-05 PROCEDURE — 3078F DIAST BP <80 MM HG: CPT | Performed by: INTERNAL MEDICINE

## 2024-06-05 PROCEDURE — 3074F SYST BP LT 130 MM HG: CPT | Performed by: INTERNAL MEDICINE

## 2024-06-05 NOTE — PATIENT INSTRUCTIONS
PET/CT scan soon  Refer to Hepatobiliary surgery at [facility]  Navigator  Genetic testing   RV will be determined

## 2024-06-06 ENCOUNTER — TELEPHONE (OUTPATIENT)
Dept: ONCOLOGY | Age: 84
End: 2024-06-06

## 2024-06-06 NOTE — TELEPHONE ENCOUNTER
Call patient to inform him PET is 6/13/24 at 7:30 am with arrival [facility] at 07:15 am.  Further informed him that [facility] oncology GI surgeons do have referral and they will reach out to him to schedule.  This was per my phone call to [facility] today speaking with Tessa.

## 2024-06-06 NOTE — TELEPHONE ENCOUNTER
Name: Ezequiel Doshi  : 1940  MRN: [id number]    Oncology Navigation     Contact Type:  Telephone    Notes:   Writer covering for Annie Gutierrez RN oncology navigator for [facility].  Referral received from Dr. Kelley for oncology navigation.  Phone call to introduce service and provide contact information for Annie.  Spoke with patient. He confirms he received folder with Annie's contact information and that she will return Monday. He asks about scheduling his PET scan. Informed him the office should be contacting him soon.    Phone call to [place] office and was informed by Cynthia they are working on scheduling the PET.    Electronically signed by Mala Mcmullen RN on 2024 at 9:44 AM

## 2024-06-10 ENCOUNTER — TELEPHONE (OUTPATIENT)
Dept: ONCOLOGY | Age: 84
End: 2024-06-10

## 2024-06-10 NOTE — TELEPHONE ENCOUNTER
Name: Ezequiel Doshi  : 1940  MRN: [id number]    Oncology Navigation Follow-Up Note    Contact Type:  Telephone    Notes: Call made to Ezequiel.  No answer.  Left message requesting call back with contact information and office hours.      Chart reviewed.  Ezequiel is scheduled for PET scan on 24 @ 0730.    Was referred to [facility] Hepatobiliary Surgery and has appointment scheduled on 24.  Appointment with Yi for Genetics on 24.      Will await call back.    Electronically signed by Annie Gutierrez RN on 6/10/2024 at 2:57 PM

## 2024-06-10 NOTE — TELEPHONE ENCOUNTER
Call received from Ezequiel.  Writer introduced self as ONN. Pt states he is scheduled for PET and genetics appointment.  Appointment dates and times confirmed.  Pt informed that referral had been confirmed by [facility] and he should be contacted by them for scheduling soon.  Ezequiel denies needs from navigation and thanked writer for checking in.  Will continue to follow.  Encouraged Ezequiel to call with any questions or needs.    Annie Gutierrez RN

## 2024-06-12 NOTE — PROGRESS NOTES
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  Clinical stage T1 cN0 M0.  CURRENT THERAPY:  Workup in progress  BRIEF CASE HISTORY:   Ezequiel Doshi is a very pleasant 83 y.o. male who is admitted to the hospital with abdominal pain thought to be due to diverticulitis.  However, CT scan showed 2 cm pancreatic neck mass suspicious for malignancy.  MRI of the abdomen confirmed the presence of a mass as will be discussed below.  We are asked to do a consultation with the patient.  This consultation was done virtually.  The patient is in his room accompanied by his wife.  They are very concerned about the findings.  He states that his abdominal pain is improving with conservative treatment and he is looking forward to being discharged hopefully today or tomorrow.  He is very concerned about abdominal mass.  I ended up reviewing the CT and MRI with him and the imaging were reviewed with him virtually.  They were able to see the mass and understand the location and the differential diagnosis.  Before this abdominal pain, the patient had was minimally symptomatic.  Most of his pain is in the left lower quadrant of the abdomen so it is unlikely to be related to the mass.  Denies any nausea, vomiting, diarrhea or GI bleeding    INTERIM HISTORY:  Patient seen in follow-up pancreatic mass after recent EUS and biopsy.  He was evaluated in the hospital when he was admitted with abdominal pain secondary to diverticulitis.  CT scans incidentally showed necrotic mass.  The patient was evaluated by GI and he had EUS done 5/28/2024.  The staging of the EUS was T1 cN0 M0 with mass in the neck of the pancreas measuring 20 x 16 mm.  No suspicious lymph nodes detected.  Biopsy results was positive for adenocarcinoma.  Clinically patient is feeling better.

## 2024-06-13 ENCOUNTER — HOSPITAL ENCOUNTER (OUTPATIENT)
Dept: PET IMAGING | Age: 84
Discharge: HOME | End: 2024-06-15
Attending: INTERNAL MEDICINE
Payer: MEDICARE

## 2024-06-13 DIAGNOSIS — C25.1 MALIGNANT NEOPLASM OF BODY OF PANCREAS (HCC): ICD-10-CM

## 2024-06-13 PROCEDURE — 3430000000 HC RX DIAGNOSTIC RADIOPHARMACEUTICAL: Performed by: INTERNAL MEDICINE

## 2024-06-13 PROCEDURE — A9609 HC RX DIAGNOSTIC RADIOPHARMACEUTICAL: HCPCS | Performed by: INTERNAL MEDICINE

## 2024-06-13 PROCEDURE — 78815 PET IMAGE W/CT SKULL-THIGH: CPT

## 2024-06-13 RX ORDER — FLUDEOXYGLUCOSE F 18 200 MCI/ML
14.2 INJECTION, SOLUTION INTRAVENOUS
Status: COMPLETED | OUTPATIENT
Start: 2024-06-13 | End: 2024-06-13

## 2024-06-13 RX ADMIN — FLUDEOXYGLUCOSE F 18 14.2 MILLICURIE: 200 INJECTION, SOLUTION INTRAVENOUS at 07:35

## 2024-06-17 ENCOUNTER — OFFICE VISIT (OUTPATIENT)
Dept: ONCOLOGY | Age: 84
End: 2024-06-17
Payer: MEDICARE

## 2024-06-17 DIAGNOSIS — C25.1 MALIGNANT NEOPLASM OF BODY OF PANCREAS (HCC): Primary | ICD-10-CM

## 2024-06-17 DIAGNOSIS — Z80.8 FAMILY HISTORY OF MELANOMA: ICD-10-CM

## 2024-06-17 DIAGNOSIS — Z80.0 FAMILY HISTORY OF ESOPHAGEAL CANCER: ICD-10-CM

## 2024-06-17 PROCEDURE — 96040 PR MEDICAL GENETICS COUNSELING EACH 30 MINUTES: CPT | Performed by: GENETIC COUNSELOR, MS

## 2024-06-17 NOTE — PROGRESS NOTES
members.     SUMMARY & PLAN  1) Mr. Doshi meets the NCCN criteria for genetic testing of the BRCA1/2 genes based on his diagnosis of pancreatic cancer.     2) Genetic testing via a multi-gene panel was recommended and offered to Mr. Doshi.     3) Mr. Doshi elected to proceed with the CancerNextStep.iot Expanded + RNA Insight Hereditary Cancer Gene Panel.     4) Mr. Doshi is aware that he will receive a notification from the laboratory ([facility]) if the out of pocket cost for testing exceeds $100 (based on individual insurance plan) and the alternative option to proceed with the self pay price of $250.     5) Informed consent was obtained and a blood sample was sent to [facility]. We will call Mr. Doshi with results as soon as they are available. A follow up appointment may be recommended.  A summary letter with results and final medical management recommendations will be sent once available.      A total of 30 minutes were spent face to face with Mr. Doshi and 50% of the time was spent educating and counseling.     The [facility] Genetic Counseling Program would be glad to offer our assistance should you have any questions or concerns about this information. Please feel free to contact us at [phone].        Yi Kumar MS, [facility]   Licensed Genetic Counselor

## 2024-06-24 ENCOUNTER — TELEPHONE (OUTPATIENT)
Dept: ONCOLOGY | Age: 84
End: 2024-06-24

## 2024-06-24 NOTE — TELEPHONE ENCOUNTER
Call received back from Ezequiel's wife Tiffany.  Tiffany states that Ezequiel wanted writer to be aware that he wanted medical oncology follow up care to be done locally.  Pt is having whipple procedure today 6/24/24. Tiffany reassured that writer will watch for discharge and will facilitate follow up appointment upon discharge.  Tiffany states she will tell Ezequiel when he is out of surgery.  Tiffany encouraged to contact writer for other questions or needs in the meantime.  Tiffany thanked writer for calling back.    Annie Gutierrez RN

## 2024-06-24 NOTE — TELEPHONE ENCOUNTER
Name: Ezequiel Doshi  : 1940  MRN: [id number]    Oncology Navigation Follow-Up Note    Contact Type:  Telephone    Notes: VM received from Ezequiel stating he needed a number to the office, he did not get it when he was in but he would be in surgery by the time writer would get the message.  Writer made call to patient's wife to follow up.  Left message with office number and requested call back with writer's office hours.     Electronically signed by Annie Gutierrez RN on 2024 at 8:57 AM

## 2024-07-03 ENCOUNTER — TELEPHONE (OUTPATIENT)
Dept: ONCOLOGY | Age: 84
End: 2024-07-03

## 2024-07-03 NOTE — TELEPHONE ENCOUNTER
Left message for Ezequiel notifying him that his genetic test results are available. Requested that he return my phone call at his earliest convenience to review results.

## 2024-07-08 ENCOUNTER — TELEPHONE (OUTPATIENT)
Dept: ONCOLOGY | Age: 84
End: 2024-07-08

## 2024-07-08 NOTE — TELEPHONE ENCOUNTER
Spoke with patient's wife, Tiffany, regarding genetic test results (negative). Tiffany states Ezequiel is still in ICU after some complications from whipple surgery a few weeks ago. Update sent to Annie LAI RN and Kaitlin JASSO RN at [facility] just as CO[facility].

## 2024-08-15 NOTE — TELEPHONE ENCOUNTER
Name: Ezequiel Doshi  : 1940  MRN: [id number]    Oncology Navigation Follow-Up Note    Contact Type:  Telephone    Notes: Call mad to Tiffany for ONN follow up after Whipple procedure on 24.  Tiffany states that Ezequiel is still in the ICU and has been taken back into surgery three times.  Wife states Ezequiel is currently on dialysis and there is no talks of discharge at this time.Tiffany thanked writer for calling and checking on Ezequiel and states she will call the office when he is ready to be discharged.  Encouraged Tiffany to to call with any needs.    Electronically signed by Annie Gutierrez RN on 8/15/2024 at 9:37 AM

## (undated) DEVICE — AIRLIFE™ NASAL OXYGEN CANNULA CURVED, FLARED TIP, WITH 7 FEET (2.1 M) CRUSH RESISTANT TUBING, OVER-THE-EAR STYLE: Brand: AIRLIFE™

## (undated) DEVICE — SOLUTION IV IRRIG POUR BRL 0.9% SODIUM CHL 2F7124

## (undated) DEVICE — SOFT SHIELD® COLLAGEN SHIELD, 12 HOURS (CE): Brand: SOFT SHIELD® COLLAGEN SHIELDS

## (undated) DEVICE — CANNULA ORAL NSL AD CO2 N INTUB O2 DEL DISP TRU LNK

## (undated) DEVICE — CONTROL SYRINGE LUER-LOCK TIP: Brand: MONOJECT

## (undated) DEVICE — Device

## (undated) DEVICE — KNIFE OPHTH DIA22MM 45DEG SLT W HNDL SHRP ANG PNT DEL DBL

## (undated) DEVICE — FORCEPS BX L240CM JAW DIA2.8MM L CAP W/ NDL MIC MESH TOOTH

## (undated) DEVICE — SNARE EXACTO COLD

## (undated) DEVICE — TUBING SUCT NON-STRL 9/32X100 W/CNNT

## (undated) DEVICE — SINGLE-USE BIOPSY FORCEPS: Brand: RADIAL JAW 4

## (undated) DEVICE — SYSTEM BX 25GA FN NDL SIX DST CUT EDG SHARKCORE

## (undated) DEVICE — TRAP SURG QUAD PARABOLA SLOT DSGN SFTY SCRN TRAPEASE